# Patient Record
Sex: FEMALE | Race: WHITE | NOT HISPANIC OR LATINO | Employment: FULL TIME | URBAN - METROPOLITAN AREA
[De-identification: names, ages, dates, MRNs, and addresses within clinical notes are randomized per-mention and may not be internally consistent; named-entity substitution may affect disease eponyms.]

---

## 2017-03-07 DIAGNOSIS — Z78.0 MENOPAUSE: ICD-10-CM

## 2017-03-07 RX ORDER — ESTRADIOL 2 MG/1
2 TABLET ORAL DAILY
Qty: 30 TABLET | Refills: 1 | Status: SHIPPED | OUTPATIENT
Start: 2017-03-07 | End: 2017-05-11

## 2017-03-07 NOTE — TELEPHONE ENCOUNTER
Last office visit 12/3/15  Future appointment currently not scheduled    Routing refill request to provider for review/approval because:  Janette given x1 and patient did not follow up, please advise    Bushra Lance   [facility]  RN

## 2017-04-10 ENCOUNTER — TELEPHONE (OUTPATIENT)
Dept: OBGYN | Facility: CLINIC | Age: 52
End: 2017-04-10

## 2017-04-10 NOTE — TELEPHONE ENCOUNTER
"Date of last gyn office visit 12-03-15.  Date of last refill 03-07-17.  Was a \"thuy\" refill with note indicating that pt needs to schedule a future gyn exam.  No future appt has been scheduled to date.  [facility] RN    Routing refill request to provider for review/approval because:  Thuy given x1 and patient did not follow up, please advise    [facility] RN  "

## 2017-04-14 NOTE — TELEPHONE ENCOUNTER
I spoke to Shae informing her that her refill was authorized for a 30 day supply and that she is due for her annual visit.  She is a patient of Dr. Judd and wants to wait for her to return from [facility]  to schedule this visit.  She is not open to seeing a different provider at this time.  Roselia Krishnamurthy  [facility] RN

## 2017-05-11 DIAGNOSIS — Z78.0 MENOPAUSE: ICD-10-CM

## 2017-05-11 NOTE — TELEPHONE ENCOUNTER
Last office visit 12/2015  Routing refill request to provider for review/approval because:  Janette given x2 and patient did not follow up, please advise    Patient called to notify of need for annual visit.  Unable to reach patient.  Left message for patient to return call to clinic.    Will need provider approval for refill.    Bushra Lance   [facility]  RN

## 2017-05-12 RX ORDER — ESTRADIOL 2 MG/1
2 TABLET ORAL DAILY
Qty: 30 TABLET | Refills: 1 | Status: SHIPPED | OUTPATIENT
Start: 2017-05-12 | End: 2017-06-13

## 2017-05-12 NOTE — TELEPHONE ENCOUNTER
Pt tried to schedule an office with Dr. Narvaez or Marty and no available appts rest of May or June and the July schedule is not open at this time and they will call the pt once schedule is open.  Will need an additional refill to carry over to July.    Roselia Krishnamurthy  [facility] RN

## 2017-05-12 NOTE — TELEPHONE ENCOUNTER
Pt calling and requesting another refill on Estradiol.  She will schedule future visit hopefully in June once Dr. Judd is back.  Recommended that she schedule her gyn exam with another provider in Dr. Judd's absence.  Pt agrees with this plan.    Roselia Krishnamurthy  [facility] RN

## 2017-05-23 ENCOUNTER — OFFICE VISIT (OUTPATIENT)
Dept: FAMILY MEDICINE | Facility: CLINIC | Age: 52
End: 2017-05-23
Payer: COMMERCIAL

## 2017-05-23 VITALS
SYSTOLIC BLOOD PRESSURE: 135 MMHG | BODY MASS INDEX: 35.33 KG/M2 | TEMPERATURE: 97.3 F | OXYGEN SATURATION: 94 % | DIASTOLIC BLOOD PRESSURE: 79 MMHG | HEART RATE: 73 BPM | WEIGHT: 192 LBS | HEIGHT: 62 IN

## 2017-05-23 DIAGNOSIS — Z12.31 VISIT FOR SCREENING MAMMOGRAM: ICD-10-CM

## 2017-05-23 DIAGNOSIS — Z12.11 SPECIAL SCREENING FOR MALIGNANT NEOPLASMS, COLON: ICD-10-CM

## 2017-05-23 DIAGNOSIS — G43.109 MIGRAINE WITH AURA AND WITHOUT STATUS MIGRAINOSUS, NOT INTRACTABLE: ICD-10-CM

## 2017-05-23 DIAGNOSIS — R41.3 MEMORY DEFICIT: Primary | ICD-10-CM

## 2017-05-23 LAB
TSH SERPL DL<=0.005 MIU/L-ACNC: 2.75 MU/L (ref 0.4–4)
VIT B12 SERPL-MCNC: 513 PG/ML (ref 193–986)

## 2017-05-23 PROCEDURE — 36415 COLL VENOUS BLD VENIPUNCTURE: CPT | Performed by: INTERNAL MEDICINE

## 2017-05-23 PROCEDURE — 82607 VITAMIN B-12: CPT | Performed by: INTERNAL MEDICINE

## 2017-05-23 PROCEDURE — 99214 OFFICE O/P EST MOD 30 MIN: CPT | Performed by: INTERNAL MEDICINE

## 2017-05-23 PROCEDURE — 84443 ASSAY THYROID STIM HORMONE: CPT | Performed by: INTERNAL MEDICINE

## 2017-05-23 RX ORDER — SUMATRIPTAN 100 MG/1
100 TABLET, FILM COATED ORAL
Qty: 12 TABLET | Refills: 4 | Status: SHIPPED | OUTPATIENT
Start: 2017-05-23 | End: 2018-11-05

## 2017-05-23 NOTE — PROGRESS NOTES
SUBJECTIVE:                                                    Shae Pierre is a 52 year old female who presents to clinic today for the following health issues:  Chief Complaint   Patient presents with     Establish Care     Migraine Mgmt     change in headaches over past year, feels more pressure in head,      Referral     mammogram     Headaches      Duration: change in migraines, getting headaches once per week last anywhere from an hour to 2 days.  Imitrex working very well.  The headaches are different than before, more pressure in the head.  Change has occurred over the past year, but increasingly worse over the past 3 - 4 months.      Description  Location: bilateral in the frontal area, bilateral in the occipital area   Character: starts w/ flashing lights, then goes into dull ache and then throbbing to sharp pain  Frequency:  Once per week  Duration:  Lasting from 1 hour to 2 days.     Intensity:  Severe when happening     Accompanying signs and symptoms:    Precipitating or Alleviating factors:  Nausea/vomiting: usually  Dizziness: usually  Weakness or numbness: no just does not feel well and very fatigued.    Visual changes: flashing lights and gets a silver line and vision is split   Fever: no   Sinus or URI symptoms no     History  Head trauma: no   Family history of migraines: YES- patient has had migraines entire life        Precipitating or Alleviating factors (light/sound/sleep/caffeine): sounds and light make symptoms worse.     dark rooms, ice packs, quiet - all help    Therapies tried and outcome: Imitrex    Outcome - usually effective  Frequent/daily pain medication use: no, Advil on occasion.       Shae reported her main concern for this visit as migraines, but she actually feels this is fairly well controlled on the Imitrex and wishes to continue that.  She has been on migraine prophylaxis in the past and does not wish to restart that at this time.  Her main concern is memory loss.  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  She has an extensive family history of Alzheimer's in her maternal grandmother, maternal aunt, father, and 3 paternal uncles.  She has noticed some trouble for about two years but this is worsening.  She is not recognizing people that she should know.  She has been forgetting words occasionally and has trouble spelling, which she has historically be very good at.     She's had some fatigue, chronic constipation, hot flashes, and hair loss.  Has not had any weight change, skin changes, numbness, tingling, weakness.        Problem list and histories reviewed & adjusted, as indicated.  Additional history: as documented    Current Outpatient Prescriptions   Medication Sig Dispense Refill     SUMAtriptan (IMITREX) 100 MG tablet Take 1 tablet (100 mg) by mouth at onset of headache for migraine May repeat in 2 hours prn: max 2/day 12 tablet 4     estradiol (ESTRACE) 2 MG tablet Take 1 tablet (2 mg) by mouth daily DUE for annual appointment, please call to schedule for further refills. 30 tablet 1     MULTI-VITAMIN OR TABS 1 TABLET DAILY 30 0     doxycycline Monohydrate 50 MG CAPS  20 capsule      triamcinolone (KENALOG) 0.1 % cream Apply sparingly to affected area two times daily as needed. 60 g 1     ADVIL 200 MG OR TABS TAKE 1 TO 2 TABLETS EVERY 4 TO 6 HOURS AS NEEDED WITH FOOD 120 0     Allergies   Allergen Reactions     Codeine Other (See Comments) and Difficulty breathing     Throat closes     Morphine Other (See Comments) and Difficulty breathing     Throat closes     Sulfa Drugs Hives     Staples Other (See Comments) and Rash     Rash and infection after knee surgery       Reviewed and updated as needed this visit by clinical staff  Tobacco  Allergies  Med Hx  Surg Hx  Fam Hx  Soc Hx      Reviewed and updated as needed this visit by Provider      "    ROS:  Constitutional, endo, neuro systems are negative, except as otherwise noted.    OBJECTIVE:                                                    /79 (BP Location: Left leg, Patient Position: Chair, Cuff Size: Adult Regular)  Pulse 73  Temp 97.3  F (36.3  C) (Tympanic)  Ht 5' 1.75\" (1.568 m)  Wt 192 lb (87.1 kg)  LMP 04/05/2012  SpO2 94%  BMI 35.4 kg/m2  Body mass index is 35.4 kg/(m^2).    GENERAL: healthy, alert and no distress  NEURO: Cranial nerves intact, normal strength and tone, sensory exam grossly normal, mentation intact, DTR's normal and symmetric at patellas, gait normal including heel/toe/tandem walking and Romberg normal, normal finger to nose and heel to shin tets    SLUMS score is 24 with deficits in math problem, object recall, clock drawing, and story recall.      Diagnostic Test Results:  pending     ASSESSMENT/PLAN:                                                        1. Memory deficit    Will check labs as below- she does have a few hypothyroid symptoms.  Recommended further memory testing with neuropsych eval since SLUMs score was borderline.  She is going to check on insurance for coverage.  If that is abnormal, recommended proceeding with MRI.      - TSH with free T4 reflex  - Vitamin B12  - NEUROPSYCHOLOGY REFERRAL  - MR Brain w/o & w Contrast; Future    2. Migraine with aura and without status migrainosus, not intractable    She feels these are well controlled with Imitrex, does not wish to retry prophylactic med at this time.     - SUMAtriptan (IMITREX) 100 MG tablet; Take 1 tablet (100 mg) by mouth at onset of headache for migraine May repeat in 2 hours prn: max 2/day  Dispense: 12 tablet; Refill: 4    3. Visit for screening mammogram    - *MA Screening Digital Bilateral; Future    4. Special screening for malignant neoplasms, colon    - GASTROENTEROLOGY ADULT REF PROCEDURE ONLY    Follow-up pending results.      Miguel Ramirez MD  [facility]  "

## 2017-05-23 NOTE — NURSING NOTE
"Chief Complaint   Patient presents with     Establish Care     Migraine Mgmt     change in headaches over past year, feels more pressure in head,      Referral     mammogram       Initial /79 (BP Location: Left leg, Patient Position: Chair, Cuff Size: Adult Regular)  Pulse 73  Temp 97.3  F (36.3  C) (Tympanic)  Ht 5' 1.75\" (1.568 m)  Wt 192 lb (87.1 kg)  LMP 04/05/2012  SpO2 94%  BMI 35.4 kg/m2 Estimated body mass index is 35.4 kg/(m^2) as calculated from the following:    Height as of this encounter: 5' 1.75\" (1.568 m).    Weight as of this encounter: 192 lb (87.1 kg).  Medication Reconciliation: complete   Katie DVAIS CMA (AAMA)    "

## 2017-05-23 NOTE — MR AVS SNAPSHOT
After Visit Summary   5/23/2017    Shae Pierre    MRN: [id number]           Patient Information     Date Of Birth          1965        Visit Information        Provider Department      5/23/2017 7:20 AM Miguel Ramirez MD [facility]        Today's Diagnoses     Memory deficit    -  1    Visit for screening mammogram        Special screening for malignant neoplasms, colon        Migraine with aura and without status migrainosus, not intractable           Follow-ups after your visit        Additional Services     GASTROENTEROLOGY ADULT REF PROCEDURE ONLY       Last Lab Result: Creatinine (mg/dL)       Date                     Value                 04/22/2011               0.54             ----------  Body mass index is 35.4 kg/(m^2).     Needed:  No  Language:  English    Patient will be contacted to schedule procedure.     Please be aware that coverage of these services is subject to the terms and limitations of your health insurance plan.  Call member services at your health plan with any benefit or coverage questions.  Any procedures must be performed at a [place] facility OR coordinated by your clinic's referral office.    Please bring the following with you to your appointment:    (1) Any X-Rays, CTs or MRIs which have been performed.  Contact the facility where they were done to arrange for  prior to your scheduled appointment.    (2) List of current medications   (3) This referral request   (4) Any documents/labs given to you for this referral            NEUROPSYCHOLOGY REFERRAL       Your provider has referred you to:    [facility]: [facility] [phone]   [email or web address]     All scheduling is subject to the client's specific insurance plan & benefits, provider/location availability, and provider clinical specialities.  Please arrive 15 minutes early for your first appointment and bring your completed  paperwork.    Please be aware that coverage of these services is subject to the terms and limitations of your health insurance plan.  Call member services at your health plan with any benefit or coverage questions.    Please bring the following to your appointment:  >>   Any x-rays, CTs or MRIs which have been performed.  Contact the facility where they were done to arrange for  prior to your scheduled appointment.  Any new CT, MRI or other procedures ordered by your specialist must be performed at a [place] facility or coordinated by your clinic's referral office.    >>   List of current medications   >>   This referral request   >>   Any documents/labs given to you for this referral                  Your next 10 appointments already scheduled     Jul 21, 2017  9:00 AM CDT   PHYSICAL with Rosaura Way MD   [facility]    [address] [address]   [phone]              Future tests that were ordered for you today     Open Future Orders        Priority Expected Expires Ordered    *MA Screening Digital Bilateral Routine  5/23/2018 5/23/2017    MR Brain w/o & w Contrast Routine  5/23/2018 5/23/2017            Who to contact     If you have questions or need follow up information about today's clinic visit or your schedule please contact [facility] directly at [phone].  Normal or non-critical lab and imaging results will be communicated to you by MyChart, letter or phone within 4 business days after the clinic has received the results. If you do not hear from us within 7 days, please contact the clinic through MyChart or phone. If you have a critical or abnormal lab result, we will notify you by phone as soon as possible.  Submit refill requests through [employer] or call your pharmacy and they will forward the refill request to us. Please allow 3 business days for your refill to be completed.           "Additional Information About Your Visit        MyChart Information     [employer] lets you send messages to your doctor, view your test results, renew your prescriptions, schedule appointments and more. To sign up, go to [email or web address] . Click on \"Log in\" on the left side of the screen, which will take you to the Welcome page. Then click on \"Sign up Now\" on the right side of the page.     You will be asked to enter the access code listed below, as well as some personal information. Please follow the directions to create your username and password.     Your access code is: [email or web address]  Expires: 2017  8:13 AM     Your access code will  in 90 days. If you need help or a new code, please call your [place] clinic or [phone].        Care EveryWhere ID     This is your Care EveryWhere ID. This could be used by other organizations to access your [place] medical records  [id number]        Your Vitals Were     Pulse Temperature Height Last Period Pulse Oximetry BMI (Body Mass Index)    73 97.3  F (36.3  C) (Tympanic) 5' 1.75\" (1.568 m) 2012 94% 35.4 kg/m2       Blood Pressure from Last 3 Encounters:   17 135/79   08/10/16 154/90   12/03/15 132/85    Weight from Last 3 Encounters:   17 192 lb (87.1 kg)   12/03/15 194 lb (88 kg)   14 199 lb (90.3 kg)              We Performed the Following     GASTROENTEROLOGY ADULT REF PROCEDURE ONLY     NEUROPSYCHOLOGY REFERRAL     TSH with free T4 reflex     Vitamin B12        Primary Care Provider Office Phone # Fax #    Miguel Ramirez [phone]       [facility] [address] [address]        Thank you!     Thank you for choosing [facility]  for your care. Our goal is always to provide you with excellent care. Hearing back from our patients is one way we can continue to improve our services. Please take a few minutes to complete the written survey that you may receive in the mail " after your visit with us. Thank you!             Your Updated Medication List - Protect others around you: Learn how to safely use, store and throw away your medicines at www.disposemymeds.org.          This list is accurate as of: 5/23/17  8:13 AM.  Always use your most recent med list.                   Brand Name Dispense Instructions for use    ADVIL 200 MG tablet   Generic drug:  ibuprofen     120    TAKE 1 TO 2 TABLETS EVERY 4 TO 6 HOURS AS NEEDED WITH FOOD       doxycycline Monohydrate 50 MG Caps capsule     20 capsule        estradiol 2 MG tablet    ESTRACE    30 tablet    Take 1 tablet (2 mg) by mouth daily DUE for annual appointment, please call to schedule for further refills.       Multi-vitamin Tabs tablet   Generic drug:  multivitamin, therapeutic with minerals     30    1 TABLET DAILY       SUMAtriptan 100 MG tablet    IMITREX    6 tablet    Take 1 tablet by mouth at onset of headache for migraine. May repeat in 2 hours prn: max 2/day       triamcinolone 0.1 % cream    KENALOG    60 g    Apply sparingly to affected area two times daily as needed.

## 2017-05-30 ENCOUNTER — HOSPITAL ENCOUNTER (OUTPATIENT)
Dept: MRI IMAGING | Facility: CLINIC | Age: 52
Discharge: HOME | End: 2017-05-30
Attending: INTERNAL MEDICINE | Admitting: INTERNAL MEDICINE
Payer: COMMERCIAL

## 2017-05-30 DIAGNOSIS — G43.109 MIGRAINE WITH AURA AND WITHOUT STATUS MIGRAINOSUS, NOT INTRACTABLE: ICD-10-CM

## 2017-05-30 PROCEDURE — 70553 MRI BRAIN STEM W/O & W/DYE: CPT

## 2017-05-30 PROCEDURE — A9585 GADOBUTROL INJECTION: HCPCS | Performed by: INTERNAL MEDICINE

## 2017-05-30 PROCEDURE — 25000128 H RX IP 250 OP 636: Performed by: INTERNAL MEDICINE

## 2017-05-30 RX ORDER — GADOBUTROL 604.72 MG/ML
8 INJECTION INTRAVENOUS ONCE
Status: COMPLETED | OUTPATIENT
Start: 2017-05-30 | End: 2017-05-30

## 2017-05-30 RX ADMIN — GADOBUTROL 8 ML: 604.72 INJECTION INTRAVENOUS at 07:02

## 2017-06-13 DIAGNOSIS — Z78.0 MENOPAUSE: ICD-10-CM

## 2017-06-13 RX ORDER — ESTRADIOL 2 MG/1
2 TABLET ORAL DAILY
Qty: 30 TABLET | Refills: 0 | Status: SHIPPED | OUTPATIENT
Start: 2017-06-13 | End: 2017-06-26

## 2017-06-13 NOTE — TELEPHONE ENCOUNTER
Estradiol      Last Written Prescription Date: 05/12/2017  Last Fill Quantity: 30, # refills: 1  Last Office Visit with [facility], [facility] or [facility] prescribing provider: 05/23/2017  Next 5 appointments (look out 90 days)     Jul 21, 2017  9:00 AM CDT   PHYSICAL with Rosaura Way MD   [facility]    [address] [address]   [phone]                   BP Readings from Last 3 Encounters:   05/23/17 135/79   08/10/16 154/90   12/03/15 132/85     Date of last Breast Exam: \

## 2017-06-13 NOTE — TELEPHONE ENCOUNTER
Has physical scheduled for 7/21/17    Prescription approved per [facility] Refill Protocol.    Aparna MEDRANO RN

## 2017-06-16 ENCOUNTER — HOSPITAL ENCOUNTER (OUTPATIENT)
Dept: MAMMOGRAPHY | Facility: CLINIC | Age: 52
Discharge: HOME | End: 2017-06-16
Attending: INTERNAL MEDICINE | Admitting: INTERNAL MEDICINE
Payer: COMMERCIAL

## 2017-06-16 DIAGNOSIS — Z12.31 VISIT FOR SCREENING MAMMOGRAM: ICD-10-CM

## 2017-06-16 PROCEDURE — 77063 BREAST TOMOSYNTHESIS BI: CPT

## 2017-06-26 ENCOUNTER — OFFICE VISIT (OUTPATIENT)
Dept: OBGYN | Facility: CLINIC | Age: 52
End: 2017-06-26
Payer: COMMERCIAL

## 2017-06-26 VITALS
DIASTOLIC BLOOD PRESSURE: 74 MMHG | HEART RATE: 77 BPM | WEIGHT: 194 LBS | BODY MASS INDEX: 35.7 KG/M2 | SYSTOLIC BLOOD PRESSURE: 112 MMHG | HEIGHT: 62 IN

## 2017-06-26 DIAGNOSIS — Z00.00 ROUTINE GENERAL MEDICAL EXAMINATION AT A HEALTH CARE FACILITY: Primary | ICD-10-CM

## 2017-06-26 DIAGNOSIS — N95.1 SYMPTOMATIC MENOPAUSAL OR FEMALE CLIMACTERIC STATES: ICD-10-CM

## 2017-06-26 DIAGNOSIS — Z13.6 CARDIOVASCULAR SCREENING; LDL GOAL LESS THAN 160: ICD-10-CM

## 2017-06-26 PROCEDURE — 99396 PREV VISIT EST AGE 40-64: CPT | Performed by: OBSTETRICS & GYNECOLOGY

## 2017-06-26 RX ORDER — ESTRADIOL 1 MG/1
1 TABLET ORAL DAILY
Qty: 90 TABLET | Refills: 3 | Status: SHIPPED | OUTPATIENT
Start: 2017-06-26 | End: 2018-07-16

## 2017-06-26 NOTE — PROGRESS NOTES
SUBJECTIVE:     CC: Shae Pierre is an 52 year old woman who presents for preventive health visit.     Healthy Habits:    Do you get at least three servings of calcium containing foods daily (dairy, green leafy vegetables, etc.)? yes    Amount of exercise or daily activities, outside of work: 5 day(s) per week    Problems taking medications regularly No    Medication side effects: No    Have you had an eye exam in the past two years? yes    Do you see a dentist twice per year? yes    Do you have sleep apnea, excessive snoring or daytime drowsiness?no            Today's PHQ-2 Score:   PHQ-2 ( 1999 [employer]) 6/26/2017 12/3/2015   Q1: Little interest or pleasure in doing things 0 0   Q2: Feeling down, depressed or hopeless 0 0   PHQ-2 Score 0 0       Abuse: Current or Past(Physical, Sexual or Emotional)- No  Do you feel safe in your environment - Yes    Social History   Substance Use Topics     Smoking status: Former Smoker     Types: Cigarettes     Quit date: 4/1/2010     Smokeless tobacco: Never Used     Alcohol use Yes      Comment: occ     The patient does not drink >3 drinks per day nor >7 drinks per week.    Recent Labs   Lab Test  01/23/12   1113   CHOL  211*   HDL  76   LDL  112   TRIG  114   CHOLHDLRATIO  3.0       Reviewed orders with patient.  Reviewed health maintenance and updated orders accordingly - Yes    Mammo Decision Support:  Patient over age 50, mutual decision to screen reflected in health maintenance.    Pertinent mammograms are reviewed under the imaging tab.  History of abnormal Pap smear: Status post benign hysterectomy. Health Maintenance and Surgical History updated.    Reviewed and updated as needed this visit by clinical staff  Tobacco  Allergies  Meds         Reviewed and updated as needed this visit by Provider            ROS:  C: NEGATIVE for fever, chills, change in weight  I: NEGATIVE for worrisome rashes, moles or lesions  E: NEGATIVE for vision changes or irritation  ENT:  "NEGATIVE for ear, mouth and throat problems  R: NEGATIVE for significant cough or SOB  B: NEGATIVE for masses, tenderness or discharge  CV: NEGATIVE for chest pain, palpitations or peripheral edema  GI: NEGATIVE for nausea, abdominal pain, heartburn, or change in bowel habits  : NEGATIVE for unusual urinary or vaginal symptoms. No vaginal bleeding.  M: NEGATIVE for significant arthralgias or myalgia  N: NEGATIVE for weakness, dizziness or paresthesias  P: NEGATIVE for changes in mood or affect       OBJECTIVE:     /74 (BP Location: Right arm, Patient Position: Chair, Cuff Size: Adult Large)  Pulse 77  Ht 5' 1.75\" (1.568 m)  Wt 194 lb (88 kg)  LMP 04/05/2012  BMI 35.77 kg/m2  EXAM:  GENERAL APPEARANCE: healthy, alert and no distress  GENERAL APPEARANCE: obese  EYES: Eyes grossly normal to inspection, PERRL and conjunctivae and sclerae normal  HENT: ear canals and TM's normal, nose and mouth without ulcers or lesions, oropharynx clear and oral mucous membranes moist  NECK: no adenopathy, no asymmetry, masses, or scars and thyroid normal to palpation  RESP: lungs clear to auscultation - no rales, rhonchi or wheezes  BREAST: normal without masses, tenderness or nipple discharge and no palpable axillary masses or adenopathy  CV: regular rate and rhythm, normal S1 S2, no S3 or S4, no murmur, click or rub, no peripheral edema and peripheral pulses strong  ABDOMEN: soft, nontender, no hepatosplenomegaly, no masses and bowel sounds normal   (female): normal female external genitalia, normal urethral meatus, vaginal mucosal atrophy noted and normal post-hysterectomy exam without masses.   Rectal; small anterior hemorroidal tags  MS: no musculoskeletal defects are noted and gait is age appropriate without ataxia  SKIN: no suspicious lesions or rashes  NEURO: Normal strength and tone, sensory exam grossly normal, mentation intact and speech normal  PSYCH: mentation appears normal and affect " "normal/bright    ASSESSMENT/PLAN:         ICD-10-CM    1. Routine general medical examination at a health care facility Z00.00    2. Symptomatic menopausal or female climacteric states N95.1 estradiol (ESTRACE) 1 MG tablet     GASTROENTEROLOGY ADULT REF PROCEDURE ONLY   3. CARDIOVASCULAR SCREENING; LDL GOAL LESS THAN 160 Z13.6 GLUCOSE     Lipid Profile with reflex to direct LDL     CANCELED: Glucose       COUNSELING:   Reviewed preventive health counseling, as reflected in patient instructions  Special attention given to:        cardiovascular screening, weight loss        Regular exercise       Healthy diet/nutrition       Colon cancer screening         reports that she quit smoking about 7 years ago. Her smoking use included Cigarettes. She has never used smokeless tobacco.    Estimated body mass index is 35.77 kg/(m^2) as calculated from the following:    Height as of this encounter: 5' 1.75\" (1.568 m).    Weight as of this encounter: 194 lb (88 kg).   Weight management plan: Discussed healthy diet and exercise guidelines and patient will follow up in 12 months in clinic to re-evaluate.    Counseling Resources:  ATP IV Guidelines  Pooled Cohorts Equation Calculator  Breast Cancer Risk Calculator  FRAX Risk Assessment  ICSI Preventive Guidelines  Dietary Guidelines for Americans, 2010  USDA's MyPlate  ASA Prophylaxis  Lung CA Screening    Amy Judd MD  [facility]  "

## 2017-06-26 NOTE — MR AVS SNAPSHOT
After Visit Summary   6/26/2017    Shae Pierre    MRN: [id number]           Patient Information     Date Of Birth          1965        Visit Information        Provider Department      6/26/2017 2:30 PM Amy Judd MD [facility]        Today's Diagnoses     Routine general medical examination at a health care facility    -  1    Symptomatic menopausal or female climacteric states        CARDIOVASCULAR SCREENING; LDL GOAL LESS THAN 160          Care Instructions      Preventive Health Recommendations  Female Ages 50 - 64    Yearly exam: See your health care provider every year in order to  o Review health changes.   o Discuss preventive care.    o Review your medicines if your doctor has prescribed any.      Get a Pap test every three years (unless you have an abnormal result and your provider advises testing more often).    If you get Pap tests with HPV test, you only need to test every 5 years, unless you have an abnormal result.     You do not need a Pap test if your uterus was removed (hysterectomy) and you have not had cancer.    You should be tested each year for STDs (sexually transmitted diseases) if you're at risk.     Have a mammogram every 1 to 2 years.    Have a colonoscopy at age 50, or have a yearly FIT test (stool test). These exams screen for colon cancer.      Have a cholesterol test every 5 years, or more often if advised.    Have a diabetes test (fasting glucose) every three years. If you are at risk for diabetes, you should have this test more often.     If you are at risk for osteoporosis (brittle bone disease), think about having a bone density scan (DEXA).    Shots: Get a flu shot each year. Get a tetanus shot every 10 years.    Nutrition:     Eat at least 5 servings of fruits and vegetables each day.    Eat whole-grain bread, whole-wheat pasta and brown rice instead of white grains and rice.    Talk to your provider about Calcium and Vitamin D.      Lifestyle    Exercise at least 150 minutes a week (30 minutes a day, 5 days a week). This will help you control your weight and prevent disease.    Limit alcohol to one drink per day.    No smoking.     Wear sunscreen to prevent skin cancer.     See your dentist every six months for an exam and cleaning.    See your eye doctor every 1 to 2 years.            Follow-ups after your visit        Additional Services     GASTROENTEROLOGY ADULT REF PROCEDURE ONLY       Last Lab Result: Creatinine (mg/dL)       Date                     Value                 04/22/2011               0.54             ----------  Body mass index is 35.77 kg/(m^2).     Needed:  No  Language:  English    Patient will be contacted to schedule procedure.     Please be aware that coverage of these services is subject to the terms and limitations of your health insurance plan.  Call member services at your health plan with any benefit or coverage questions.  Any procedures must be performed at a [place] facility OR coordinated by your clinic's referral office.    Please bring the following with you to your appointment:    (1) Any X-Rays, CTs or MRIs which have been performed.  Contact the facility where they were done to arrange for  prior to your scheduled appointment.    (2) List of current medications   (3) This referral request   (4) Any documents/labs given to you for this referral                  Future tests that were ordered for you today     Open Future Orders        Priority Expected Expires Ordered    GLUCOSE Routine  6/26/2018 6/26/2017    Lipid Profile with reflex to direct LDL Routine  6/26/2018 6/26/2017            Who to contact     If you have questions or need follow up information about today's clinic visit or your schedule please contact [facility] directly at [phone].  Normal or non-critical lab and imaging results will be communicated to you by MyChart, letter or phone within 4  "business days after the clinic has received the results. If you do not hear from us within 7 days, please contact the clinic through [employer] or phone. If you have a critical or abnormal lab result, we will notify you by phone as soon as possible.  Submit refill requests through [employer] or call your pharmacy and they will forward the refill request to us. Please allow 3 business days for your refill to be completed.          Additional Information About Your Visit        [employer] Information     [employer] lets you send messages to your doctor, view your test results, renew your prescriptions, schedule appointments and more. To sign up, go to [email or web address] . Click on \"Log in\" on the left side of the screen, which will take you to the Welcome page. Then click on \"Sign up Now\" on the right side of the page.     You will be asked to enter the access code listed below, as well as some personal information. Please follow the directions to create your username and password.     Your access code is: [email or web address]  Expires: 2017  8:13 AM     Your access code will  in 90 days. If you need help or a new code, please call your [facility] clinic or [phone].        Care EveryWhere ID     This is your Care EveryWhere ID. This could be used by other organizations to access your [facility] medical records  [id number]        Your Vitals Were     Pulse Height Last Period BMI (Body Mass Index)          77 5' 1.75\" (1.568 m) 2012 35.77 kg/m2         Blood Pressure from Last 3 Encounters:   17 112/74   17 135/79   08/10/16 154/90    Weight from Last 3 Encounters:   17 194 lb (88 kg)   17 192 lb (87.1 kg)   12/03/15 194 lb (88 kg)              We Performed the Following     GASTROENTEROLOGY ADULT REF PROCEDURE ONLY          Today's Medication Changes          These changes are accurate as of: 17  3:01 PM.  If you have any questions, ask your nurse or doctor.               These " medicines have changed or have updated prescriptions.        Dose/Directions    estradiol 1 MG tablet   Commonly known as:  ESTRACE   This may have changed:    - medication strength  - how much to take  - additional instructions   Used for:  Symptomatic menopausal or female climacteric states   Changed by:  Amy Judd MD        Dose:  1 mg   Take 1 tablet (1 mg) by mouth daily   Quantity:  90 tablet   Refills:  3            Where to get your medicines      These medications were sent to [place] [facility] - [address]  [address], [place] [address]    Hours:  T[place] [facility] Phone:  [phone]     estradiol 1 MG tablet                Primary Care Provider Office Phone # Fax #    RuthAreli Ramirez [phone]       [facility] [address] [address]        Equal Access to Services     JAMAL DUARTE : Hadii eli rice hadasho Soomaali, waaxda luqadaha, qaybta kaalmada adeegyada, cayla bourne haymarsha costello . So [facility] [phone].    ATENCIÓN: Si habla español, tiene a metcalf disposición servicios gratuitos de asistencia lingüística. [facility] [phone].    We comply with applicable federal civil rights laws and [place] laws. We do not discriminate on the basis of race, color, national origin, age, disability sex, sexual orientation or gender identity.            Thank you!     Thank you for choosing [facility]  for your care. Our goal is always to provide you with excellent care. Hearing back from our patients is one way we can continue to improve our services. Please take a few minutes to complete the written survey that you may receive in the mail after your visit with us. Thank you!             Your Updated Medication List - Protect others around you: Learn how to safely use, store and throw away your medicines at www.disposemymeds.org.          This list is accurate as of: 6/26/17   3:01 PM.  Always use your most recent med list.                   Brand Name Dispense Instructions for use Diagnosis    ADVIL 200 MG tablet   Generic drug:  ibuprofen     120    TAKE 1 TO 2 TABLETS EVERY 4 TO 6 HOURS AS NEEDED WITH FOOD        doxycycline Monohydrate 50 MG Caps capsule     20 capsule         estradiol 1 MG tablet    ESTRACE    90 tablet    Take 1 tablet (1 mg) by mouth daily    Symptomatic menopausal or female climacteric states       Multi-vitamin Tabs tablet   Generic drug:  multivitamin, therapeutic with minerals     30    1 TABLET DAILY        SUMAtriptan 100 MG tablet    IMITREX    12 tablet    Take 1 tablet (100 mg) by mouth at onset of headache for migraine May repeat in 2 hours prn: max 2/day    Migraine with aura and without status migrainosus, not intractable       triamcinolone 0.1 % cream    KENALOG    60 g    Apply sparingly to affected area two times daily as needed.    Dermatitis

## 2017-06-26 NOTE — NURSING NOTE
"Initial /74 (BP Location: Right arm, Patient Position: Chair, Cuff Size: Adult Large)  Pulse 77  Ht 5' 1.75\" (1.568 m)  Wt 194 lb (88 kg)  LMP 04/05/2012  BMI 35.77 kg/m2 Estimated body mass index is 35.77 kg/(m^2) as calculated from the following:    Height as of this encounter: 5' 1.75\" (1.568 m).    Weight as of this encounter: 194 lb (88 kg). .      "

## 2018-04-03 ENCOUNTER — ANESTHESIA EVENT (OUTPATIENT)
Dept: GASTROENTEROLOGY | Facility: CLINIC | Age: 53
End: 2018-04-03
Payer: COMMERCIAL

## 2018-04-03 ASSESSMENT — LIFESTYLE VARIABLES: TOBACCO_USE: 1

## 2018-04-03 NOTE — ANESTHESIA PREPROCEDURE EVALUATION
Anesthesia Evaluation     . Pt has had prior anesthetic. Type: MAC and General    History of anesthetic complications   - PONV        ROS/MED HX    ENT/Pulmonary:     (+)tobacco use, Past use , . .    Neurologic:     (+)migraines,     Cardiovascular:  - neg cardiovascular ROS       METS/Exercise Tolerance:  >4 METS   Hematologic:  - neg hematologic  ROS       Musculoskeletal:  - neg musculoskeletal ROS       GI/Hepatic:  - neg GI/hepatic ROS       Renal/Genitourinary:  - ROS Renal section negative       Endo:     (+) Obesity, .      Psychiatric:  - neg psychiatric ROS       Infectious Disease:  - neg infectious disease ROS       Malignancy:      - no malignancy   Other:    - neg other ROS                 Physical Exam  Normal systems: cardiovascular, pulmonary and dental    Airway   Mallampati: II  TM distance: >3 FB  Neck ROM: full    Dental     Cardiovascular       Pulmonary                     Anesthesia Plan      History & Physical Review  History and physical reviewed and following examination; no interval change.    ASA Status:  2 .    NPO Status:  > 4 hours    Plan for MAC Reason for MAC:  Deep or markedly invasive procedure (G8)         Postoperative Care      Consents  Anesthetic plan, risks, benefits and alternatives discussed with:  Patient..                          .

## 2018-04-16 ENCOUNTER — APPOINTMENT (OUTPATIENT)
Dept: GENERAL RADIOLOGY | Facility: CLINIC | Age: 53
End: 2018-04-16
Attending: SURGERY
Payer: COMMERCIAL

## 2018-04-16 ENCOUNTER — SURGERY (OUTPATIENT)
Age: 53
End: 2018-04-16

## 2018-04-16 ENCOUNTER — ANESTHESIA (OUTPATIENT)
Dept: GASTROENTEROLOGY | Facility: CLINIC | Age: 53
End: 2018-04-16
Payer: COMMERCIAL

## 2018-04-16 ENCOUNTER — HOSPITAL ENCOUNTER (OUTPATIENT)
Facility: CLINIC | Age: 53
Discharge: HOME | End: 2018-04-16
Attending: SURGERY | Admitting: SURGERY
Payer: COMMERCIAL

## 2018-04-16 VITALS
WEIGHT: 196 LBS | HEIGHT: 60 IN | OXYGEN SATURATION: 100 % | BODY MASS INDEX: 38.48 KG/M2 | SYSTOLIC BLOOD PRESSURE: 163 MMHG | DIASTOLIC BLOOD PRESSURE: 97 MMHG | TEMPERATURE: 98.7 F | RESPIRATION RATE: 16 BRPM

## 2018-04-16 LAB — COLONOSCOPY: NORMAL

## 2018-04-16 PROCEDURE — 25000125 ZZHC RX 250: Performed by: SURGERY

## 2018-04-16 PROCEDURE — 25000128 H RX IP 250 OP 636: Performed by: SURGERY

## 2018-04-16 PROCEDURE — 25000128 H RX IP 250 OP 636: Performed by: NURSE ANESTHETIST, CERTIFIED REGISTERED

## 2018-04-16 PROCEDURE — 37000008 ZZH ANESTHESIA TECHNICAL FEE, 1ST 30 MIN: Performed by: SURGERY

## 2018-04-16 PROCEDURE — 25000125 ZZHC RX 250: Performed by: NURSE ANESTHETIST, CERTIFIED REGISTERED

## 2018-04-16 PROCEDURE — 71045 X-RAY EXAM CHEST 1 VIEW: CPT

## 2018-04-16 PROCEDURE — G0121 COLON CA SCRN NOT HI RSK IND: HCPCS | Performed by: SURGERY

## 2018-04-16 PROCEDURE — 93005 ELECTROCARDIOGRAM TRACING: CPT

## 2018-04-16 PROCEDURE — 45378 DIAGNOSTIC COLONOSCOPY: CPT | Performed by: SURGERY

## 2018-04-16 RX ORDER — LIDOCAINE HYDROCHLORIDE 10 MG/ML
INJECTION, SOLUTION INFILTRATION; PERINEURAL PRN
Status: DISCONTINUED | OUTPATIENT
Start: 2018-04-16 | End: 2018-04-16

## 2018-04-16 RX ORDER — GLYCOPYRROLATE 0.2 MG/ML
INJECTION, SOLUTION INTRAMUSCULAR; INTRAVENOUS PRN
Status: DISCONTINUED | OUTPATIENT
Start: 2018-04-16 | End: 2018-04-16

## 2018-04-16 RX ORDER — LIDOCAINE 40 MG/G
CREAM TOPICAL
Status: DISCONTINUED | OUTPATIENT
Start: 2018-04-16 | End: 2018-04-16 | Stop reason: HOSPADM

## 2018-04-16 RX ORDER — PROPOFOL 10 MG/ML
INJECTION, EMULSION INTRAVENOUS PRN
Status: DISCONTINUED | OUTPATIENT
Start: 2018-04-16 | End: 2018-04-16

## 2018-04-16 RX ORDER — PROPOFOL 10 MG/ML
INJECTION, EMULSION INTRAVENOUS CONTINUOUS PRN
Status: DISCONTINUED | OUTPATIENT
Start: 2018-04-16 | End: 2018-04-16

## 2018-04-16 RX ORDER — ONDANSETRON 2 MG/ML
4 INJECTION INTRAMUSCULAR; INTRAVENOUS
Status: DISCONTINUED | OUTPATIENT
Start: 2018-04-16 | End: 2018-04-16 | Stop reason: HOSPADM

## 2018-04-16 RX ORDER — SODIUM CHLORIDE, SODIUM LACTATE, POTASSIUM CHLORIDE, CALCIUM CHLORIDE 600; 310; 30; 20 MG/100ML; MG/100ML; MG/100ML; MG/100ML
INJECTION, SOLUTION INTRAVENOUS CONTINUOUS
Status: DISCONTINUED | OUTPATIENT
Start: 2018-04-16 | End: 2018-04-16 | Stop reason: HOSPADM

## 2018-04-16 RX ORDER — HYDROMORPHONE HYDROCHLORIDE 1 MG/ML
.5-1 INJECTION, SOLUTION INTRAMUSCULAR; INTRAVENOUS; SUBCUTANEOUS
Status: DISCONTINUED | OUTPATIENT
Start: 2018-04-16 | End: 2018-04-16 | Stop reason: HOSPADM

## 2018-04-16 RX ADMIN — GLYCOPYRROLATE 0.1 MG: 0.2 INJECTION, SOLUTION INTRAMUSCULAR; INTRAVENOUS at 10:47

## 2018-04-16 RX ADMIN — LIDOCAINE HYDROCHLORIDE 50 MG: 10 INJECTION, SOLUTION INFILTRATION; PERINEURAL at 10:46

## 2018-04-16 RX ADMIN — SODIUM CHLORIDE, POTASSIUM CHLORIDE, SODIUM LACTATE AND CALCIUM CHLORIDE: 600; 310; 30; 20 INJECTION, SOLUTION INTRAVENOUS at 10:39

## 2018-04-16 RX ADMIN — PROPOFOL 50 MG: 10 INJECTION, EMULSION INTRAVENOUS at 10:47

## 2018-04-16 RX ADMIN — LIDOCAINE HYDROCHLORIDE 1 ML: 10 INJECTION, SOLUTION EPIDURAL; INFILTRATION; INTRACAUDAL; PERINEURAL at 10:39

## 2018-04-16 RX ADMIN — PROPOFOL 200 MCG/KG/MIN: 10 INJECTION, EMULSION INTRAVENOUS at 10:47

## 2018-04-16 RX ADMIN — PROPOFOL 20 MG: 10 INJECTION, EMULSION INTRAVENOUS at 10:49

## 2018-04-16 RX ADMIN — HYDROMORPHONE HYDROCHLORIDE 0.5 MG: 1 INJECTION, SOLUTION INTRAMUSCULAR; INTRAVENOUS; SUBCUTANEOUS at 12:26

## 2018-04-16 RX ADMIN — PROPOFOL 30 MG: 10 INJECTION, EMULSION INTRAVENOUS at 10:53

## 2018-04-16 RX ADMIN — GLYCOPYRROLATE 0.1 MG: 0.2 INJECTION, SOLUTION INTRAMUSCULAR; INTRAVENOUS at 10:46

## 2018-04-16 NOTE — PROGRESS NOTES
Ekg and chest xray normal per Dr Donovan. Pressure in right anterior chest is almost gone. BP coming down. Dr Donovan says discharge to home. Pt will go to ED if chest pain. She will have her BP rechecked tonight and tomorrow.Report any elevation to her primary MD.

## 2018-04-16 NOTE — PROGRESS NOTES
Pt has been having chest tightness on right. Above breast. BP is high. She says she doesn't feel well. Dr Donovan was notified and see orders. O2 on at 3 L. nc

## 2018-04-16 NOTE — ANESTHESIA POSTPROCEDURE EVALUATION
Patient: Shae Pierre    Procedure(s):  Colonoscopy - Wound Class: II-Clean Contaminated    Diagnosis:screening  Diagnosis Additional Information: No value filed.    Anesthesia Type:  No value filed.    Note:  Anesthesia Post Evaluation    Patient location during evaluation: Phase 2  Patient participation: Able to fully participate in evaluation  Level of consciousness: awake  Pain management: adequate  Airway patency: patent  Cardiovascular status: acceptable and hemodynamically stable  Respiratory status: acceptable, room air and spontaneous ventilation  Hydration status: acceptable  PONV: none     Anesthetic complications: None          Last vitals:  Vitals:    04/16/18 1019   BP: (!) 131/116   Resp: 16   Temp: 36.9  C (98.5  F)   SpO2: 97%         Electronically Signed By: STEFANIE Santillan CRNA  April 16, 2018  11:01 AM

## 2018-04-16 NOTE — ANESTHESIA CARE TRANSFER NOTE
Patient: Shae Pierre    Procedure(s):  Colonoscopy - Wound Class: II-Clean Contaminated    Diagnosis: screening  Diagnosis Additional Information: No value filed.    Anesthesia Type:   No value filed.     Note:  Airway :Room Air  Patient transferred to:Phase II  Handoff Report: Identifed the Patient, Identified the Reponsible Provider, Reviewed the pertinent medical history, Discussed the surgical course, Reviewed Intra-OP anesthesia mangement and issues during anesthesia, Set expectations for post-procedure period and Allowed opportunity for questions and acknowledgement of understanding      Vitals: (Last set prior to Anesthesia Care Transfer)    CRNA VITALS  4/16/2018 1030 - 4/16/2018 1100      4/16/2018             Pulse: 84    SpO2: 98 %    EKG: NSR                Electronically Signed By: STEFANIE Santillan CRNA  April 16, 2018  11:00 AM

## 2018-04-16 NOTE — H&P
53 year old year old female here for colonoscopy for screening.    Patient Active Problem List   Diagnosis     Elevated blood pressure reading without diagnosis of hypertension     Dermatophytosis of nail     Obesity     CARDIOVASCULAR SCREENING; LDL GOAL LESS THAN 160     Migraine headache     Breast cyst     Urge incontinence of urine       Past Medical History:   Diagnosis Date     Dysplasia of cervix (uteri) 1998     Fibroids 2/22/2012     Other abnormal Pap smear and cervical HPV (human papapillomavirus) 9/29/2008    EM>40 and pos HPV unknown type rec rpt pap 6 mos/11/08  1/26/2009 F/u pap was normal  Will have next pap in 1 year.      Other acne      PONV (postoperative nausea and vomiting)        Past Surgical History:   Procedure Laterality Date     EXCISE MASS TRUNK  7/13/2012    Procedure: EXCISE MASS TRUNK;  Excisional Removal Chest Wall Cysts X2;  Surgeon: Dennys Henriquez MD;  Location: [facility] OR     HYSTERECTOMY, PAP NO LONGER INDICATED       LAPAROSCOPIC ASSISTED HYSTERECTOMY VAGINAL, BILATERAL SALPINGO-OOPHORECTOMY, COMBINED  4/5/2012    LAVH-BSO     SURGICAL HISTORY OF -       LEEP     SURGICAL HISTORY OF -       Cryotherapy      SURGICAL HISTORY OF -       Colpo     SURGICAL HISTORY OF -       left knee[facility]patella [facility]       [facility]    No current outpatient prescriptions on file.       Allergies   Allergen Reactions     Codeine Other (See Comments) and Difficulty breathing     Throat closes     Morphine Other (See Comments) and Difficulty breathing     Throat closes     Sulfa Drugs Hives     Staples Other (See Comments) and Rash     Rash and infection after knee surgery       Pt reports that she quit smoking about 8 years ago. Her smoking use included Cigarettes. She has never used smokeless tobacco. She reports that she drinks alcohol. She reports that she does not use illicit drugs.    Exam:  LMP 04/05/2012    Awake, Alert OX3  Lungs - CTA bilaterally  CV - RRR, no murmurs, distal pulses  intact  Abd - soft, non-distended, non-tender, +BS  Extr - No cyanosis or edema    A/P 53 year old year old female in need of colonoscopy for screening. Risks, benefits, alternatives, and complications were discussed including the possibility of perforation and the patient agreed to proceed    Chuck Donovan MD

## 2018-04-18 ENCOUNTER — OFFICE VISIT (OUTPATIENT)
Dept: FAMILY MEDICINE | Facility: CLINIC | Age: 53
End: 2018-04-18
Payer: COMMERCIAL

## 2018-04-18 VITALS
WEIGHT: 200 LBS | TEMPERATURE: 98.6 F | HEART RATE: 75 BPM | OXYGEN SATURATION: 96 % | SYSTOLIC BLOOD PRESSURE: 118 MMHG | DIASTOLIC BLOOD PRESSURE: 71 MMHG | BODY MASS INDEX: 39.06 KG/M2

## 2018-04-18 DIAGNOSIS — R10.13 ABDOMINAL PAIN, EPIGASTRIC: ICD-10-CM

## 2018-04-18 DIAGNOSIS — I10 BENIGN ESSENTIAL HYPERTENSION: Primary | ICD-10-CM

## 2018-04-18 DIAGNOSIS — Z13.6 CARDIOVASCULAR SCREENING; LDL GOAL LESS THAN 130: ICD-10-CM

## 2018-04-18 LAB
ALBUMIN SERPL-MCNC: 3.7 G/DL (ref 3.4–5)
ALP SERPL-CCNC: 96 U/L (ref 40–150)
ALT SERPL W P-5'-P-CCNC: 34 U/L (ref 0–50)
ANION GAP SERPL CALCULATED.3IONS-SCNC: 6 MMOL/L (ref 3–14)
AST SERPL W P-5'-P-CCNC: 26 U/L (ref 0–45)
BASOPHILS # BLD AUTO: 0.1 10E9/L (ref 0–0.2)
BASOPHILS NFR BLD AUTO: 0.6 %
BILIRUB SERPL-MCNC: 0.3 MG/DL (ref 0.2–1.3)
BUN SERPL-MCNC: 9 MG/DL (ref 7–30)
CALCIUM SERPL-MCNC: 8.4 MG/DL (ref 8.5–10.1)
CHLORIDE SERPL-SCNC: 109 MMOL/L (ref 94–109)
CHOLEST SERPL-MCNC: 216 MG/DL
CO2 SERPL-SCNC: 25 MMOL/L (ref 20–32)
CREAT SERPL-MCNC: 0.6 MG/DL (ref 0.52–1.04)
DIFFERENTIAL METHOD BLD: NORMAL
EOSINOPHIL # BLD AUTO: 0.4 10E9/L (ref 0–0.7)
EOSINOPHIL NFR BLD AUTO: 3.3 %
ERYTHROCYTE [DISTWIDTH] IN BLOOD BY AUTOMATED COUNT: 12.9 % (ref 10–15)
GFR SERPL CREATININE-BSD FRML MDRD: >90 ML/MIN/1.7M2
GLUCOSE SERPL-MCNC: 88 MG/DL (ref 70–99)
HCT VFR BLD AUTO: 43.2 % (ref 35–47)
HDLC SERPL-MCNC: 50 MG/DL
HGB BLD-MCNC: 14.2 G/DL (ref 11.7–15.7)
LDLC SERPL CALC-MCNC: 116 MG/DL
LIPASE SERPL-CCNC: 159 U/L (ref 73–393)
LYMPHOCYTES # BLD AUTO: 3 10E9/L (ref 0.8–5.3)
LYMPHOCYTES NFR BLD AUTO: 27 %
MCH RBC QN AUTO: 31.8 PG (ref 26.5–33)
MCHC RBC AUTO-ENTMCNC: 32.9 G/DL (ref 31.5–36.5)
MCV RBC AUTO: 97 FL (ref 78–100)
MONOCYTES # BLD AUTO: 0.9 10E9/L (ref 0–1.3)
MONOCYTES NFR BLD AUTO: 8.4 %
NEUTROPHILS # BLD AUTO: 6.6 10E9/L (ref 1.6–8.3)
NEUTROPHILS NFR BLD AUTO: 60.7 %
NONHDLC SERPL-MCNC: 166 MG/DL
PLATELET # BLD AUTO: 334 10E9/L (ref 150–450)
POTASSIUM SERPL-SCNC: 4.1 MMOL/L (ref 3.4–5.3)
PROT SERPL-MCNC: 7.1 G/DL (ref 6.8–8.8)
RBC # BLD AUTO: 4.46 10E12/L (ref 3.8–5.2)
SODIUM SERPL-SCNC: 140 MMOL/L (ref 133–144)
TRIGL SERPL-MCNC: 248 MG/DL
WBC # BLD AUTO: 10.9 10E9/L (ref 4–11)

## 2018-04-18 PROCEDURE — 83690 ASSAY OF LIPASE: CPT | Performed by: INTERNAL MEDICINE

## 2018-04-18 PROCEDURE — 99214 OFFICE O/P EST MOD 30 MIN: CPT | Performed by: INTERNAL MEDICINE

## 2018-04-18 PROCEDURE — 36415 COLL VENOUS BLD VENIPUNCTURE: CPT | Performed by: INTERNAL MEDICINE

## 2018-04-18 PROCEDURE — 85025 COMPLETE CBC W/AUTO DIFF WBC: CPT | Performed by: INTERNAL MEDICINE

## 2018-04-18 PROCEDURE — 80053 COMPREHEN METABOLIC PANEL: CPT | Performed by: INTERNAL MEDICINE

## 2018-04-18 PROCEDURE — 80061 LIPID PANEL: CPT | Performed by: INTERNAL MEDICINE

## 2018-04-18 NOTE — PROGRESS NOTES
SUBJECTIVE:   Shae Pierre is a 53 year old female who presents to clinic today for the following health issues:    Chief Complaint   Patient presents with     Abdominal Pain     Pt is fasting.     Hypertension     Back Pain     Neck       Hypertension Follow-up      Outpatient blood pressures are being checked at [facility] (for her colonoscopy) .  Results are 163/97.    Low Salt Diet: not monitoring salt    Amount of exercise or physical activity: 4-5 days/week for an average of 45-60 minutes    Problems taking medications regularly: No    Medication side effects: none    Diet: regular (no restrictions)    Intermittent elevated BP readings since 2005, more consistent since 2012.  Very high during c-scope.    BP Readings from Last 6 Encounters:   04/18/18 118/71   04/16/18 (!) 163/97   06/26/17 112/74   05/23/17 135/79   08/10/16 154/90   12/03/15 132/85     --father and most siblings have hypertension.    ABDOMINAL PAIN     Onset: Dec/14/2017    Description:   Character: Stabbing and Cramping  Location: epigastric region divya-umbilical region suprapubic region  Radiation: from lower divya-umbilical to epigastric    Intensity: severe, 9/10 at worst , Right now 0/10    Progression of Symptoms:  worsening and intermittent    Accompanying Signs & Symptoms:  Fever/Chills?: YES- off and on  Gas/Bloating: no   Nausea: YES  Vomitting: YES- severe  Diarrhea?: no   Constipation:no   Dysuria or Hematuria: no    History:   Trauma: no   Previous similar pain: no    Previous tests done: none    Precipitating factors:   Does the pain change with:     Food: no      BM: not sure    Urination: not sure    Alleviating factors:  na    Therapies Tried and outcome: na    LMP:  not applicable     --hasn't been seen for this complaint.  --feels like 'tearing' in epigastric and umbilical area  --pain can be severe at times, causing vomiting.  The vomiting seems to alleviate the pain somewhat.  The emesis is pink, hasn't noted blood  --pain  is monthly or so.  Can last 10 hours or so.  Estimates she has had about 8 episodes.  --last episode was around 3/15.  --when present, nothing seems to worsen the pain  --has tried heat, ice, Tums, rolaids - no relief  --food seems to worsen the pain, but fasting doesn't improve the pain.  --she has no symptoms in between episodes  --subjective fevers/chills associated with pain episodes, but she hasn't checked temperature.    --has chronic constipation, unchanged leading up to and after episodes.  She does think having bowel movement improves pain  --hasn't been able to correlate episodes with spicy foods    Neck Pain  Onset: 3 week    Description:   Location: both sides of the neck - very stiff  Radiation: into the back of the head    Intensity: moderate, 5 to 6/10 at worst - mild 2/10 current    Progression of Symptoms:  worsening and intermittent    Accompanying Signs & Symptoms:  Burning, prickly sensation (paresthesias) in arm(s): Tingling  Numbness in arm(s): no   Weakness in arm(s):  no   Fever: YES- somewhat - might be related to other issues above  Headache: YES  Nausea and/or vomiting: YES    History:   Trauma: no   Previous neck pain: YES- years ago  Previous surgery or injections: no   Previous Imaging (MRI,X ray): no     Precipitating factors:   Does movement increase the pain:  YES    Alleviating factors:na    Therapies Tried and outcome:  na        Current Outpatient Prescriptions   Medication Sig Dispense Refill     estradiol (ESTRACE) 1 MG tablet Take 1 tablet (1 mg) by mouth daily 90 tablet 3     MULTI-VITAMIN OR TABS 1 TABLET DAILY 30 0     ADVIL 200 MG OR TABS TAKE 1 TO 2 TABLETS EVERY 4 TO 6 HOURS AS NEEDED WITH FOOD 120 0     SUMAtriptan (IMITREX) 100 MG tablet Take 1 tablet (100 mg) by mouth at onset of headache for migraine May repeat in 2 hours prn: max 2/day (Patient not taking: Reported on 4/18/2018) 12 tablet 4     triamcinolone (KENALOG) 0.1 % cream Apply sparingly to affected area two  times daily as needed. (Patient not taking: Reported on 4/18/2018) 60 g 1       Reviewed and updated as needed this visit by clinical staff  Tobacco  Allergies  Meds  Problems  Med Hx  Surg Hx  Fam Hx  Soc Hx        Reviewed and updated as needed this visit by Provider  Allergies  Meds  Problems         ROS:  Constitutional, HEENT, cardiovascular, pulmonary, gi and gu systems are negative, except as otherwise noted.    OBJECTIVE:     /71 (BP Location: Right arm, Patient Position: Chair, Cuff Size: Adult Large)  Pulse 75  Temp 98.6  F (37  C) (Tympanic)  Wt 200 lb (90.7 kg)  LMP 04/05/2012  SpO2 96%  Breastfeeding? No  BMI 39.06 kg/m2  Body mass index is 39.06 kg/(m^2).  GENERAL APPEARANCE: healthy, alert, no distress and over weight  NECK: no adenopathy, no asymmetry, masses, or scars and thyroid normal to palpation  RESP: lungs clear to auscultation - no rales, rhonchi or wheezes  CV: regular rates and rhythm, normal S1 S2, no S3 or S4 and no murmur, click or rub  ABDOMEN: soft, nontender, without hepatosplenomegaly or masses, bowel sounds normal and obese    Diagnostic Test Results:  No results found for this or any previous visit (from the past 24 hour(s)).  Results for orders placed or performed during the hospital encounter of 04/16/18   COLONOSCOPY   Result Value Ref Range    COLONOSCOPY       _______________________________________________________________________________  Patient Name: Shae Pierre            Procedure Date: 4/16/2018 10:35 AM  MRN: [id number]                       YOB: 1965  Admit Type: Outpatient                Age: 53  Gender: Female                        Attending MD: Chuck Donovan MD  Total Sedation Time:                    _______________________________________________________________________________     Procedure:           Colonoscopy  Indications:         Screening for colorectal malignant neoplasm  Providers:           Chuck Donovan MD,  Lucy Santos, RN  Referring MD:        Amy Judd MD  Medicines:           Propofol per Anesthesia  Complications:       No immediate complications.  _______________________________________________________________________________  Procedure:           After obtaining informed consent, the colonoscope was                        passed under direct vision. Throughout the procedure,                         the patient's blood pressure, pulse, and oxygen                        saturations were monitored continuously. The Colonoscope                        was introduced through the anus and advanced to the                        cecum, identified by appendiceal orifice and ileocecal                        valve. The colonoscopy was performed without difficulty.                        The patient tolerated the procedure well. The quality of                        the bowel preparation was good.                                                                                   Findings:       The perianal and digital rectal examinations were normal.       Multiple medium-mouthed diverticula were found in the entire colon.       Non-bleeding internal hemorrhoids were found during retroflexion. The        hemorrhoids were mild.       There is no endoscopic evidence of polyps in the entire colon.                                                                                    Impression:          - Diverticulosis in the entire examined colon.                       - Non-bleeding internal hemorrhoids.                       - No specimens collected.  Recommendation:      - Discharge patient to home.                       - High fiber diet.                       - Repeat colonoscopy in 10 years for screening purposes.                                                                                     Signed electronically by Chuck Donovan M.D.  _________________  Chuck Donovan MD  4/16/2018 11:01:22 AM  I was  physically present for the entire viewing portion of the exam.  [id number] Donovan MD  Number of Addenda: 0    Note Initiated On: 4/16/2018 10:35 AM  Scope In: 10:50:52 AM  Scope Out: 11:01:50 AM     XR Chest Port 1 View    Narrative    XR CHEST PORT 1 VW 4/16/2018 12:17 PM    HISTORY: Chest pain following colonoscopy.    COMPARISON: 4/22/2011.      Impression    IMPRESSION: A single view the chest shows no acute cardiopulmonary  disease. There is no free peritoneal air below the diaphragm.     MARIANNE PICKARD MD       ASSESSMENT/PLAN:       1. Benign essential hypertension - new diagnosis.  Discussed DASH diet, exercise, weight loss.  Monitor BP as outpatient, bring log and recheck in 3-6 months.  If still elevated, would start lisinopril/hctz.    2. Abdominal pain, epigastric - unclear cause given patient is not having symptoms currently. Differential includes gastritis, ulcerative disease, biliary colic, pancreatitis, AAA, IBS, other. Start with labs as below. Due to her description of tearing pain along with elevated blood pressure, we'll get a CT to rule out AAA. Advised patient to be seen during her next episode to determine the etiology.  - CT Abdomen Pelvis w Contrast; Future  - Comprehensive metabolic panel  - CBC with platelets and differential  - Lipase    3. CARDIOVASCULAR SCREENING; LDL GOAL LESS THAN 130  - Lipid Profile (Chol, Trig, HDL, LDL calc)    1. You have hypertension.  Work on lifestyle changes - low salt diet (DASH diet), increasing exercise.  Aim for 30 min of brisk exercise most days of the week.  5-10% weight loss 10-20 lbs, can sometimes help.  Getting close to ideal body weight (150 lbs would still be overweight, but no longer in obese category) could be helpful too.  Strong family history of hypertension - make sit more likely you have hypertension.  We should recheck blood pressure in 3-6 months - determine if you need to start medications at that time.  2. Blood work today  3. Radiology  test was ordered -CT abdomen.  Please call [phone] to schedule.  4. Be seen during next episode of severe pain - either in clinic or in the ER/Urgent Care.  Difficult to diagnose during a pain free episode.        Return to clinic to discuss neck pain further      Patience Barrett,   [facility]

## 2018-04-18 NOTE — PATIENT INSTRUCTIONS
1. You have hypertension.  Work on lifestyle changes - low salt diet (DASH diet), increasing exercise.  Aim for 30 min of brisk exercise most days of the week.  5-10% weight loss 10-20 lbs, can sometimes help.  Getting close to ideal body weight (150 lbs would still be overweight, but no longer in obese category) could be helpful too.  Strong family history of hypertension - make sit more likely you have hypertension.  We should recheck blood pressure in 3-6 months - determine if you need to start medications at that time.  2. Blood work today  3. Radiology test was ordered -CT abdomen.  Please call [phone] to schedule.  4. Be seen during next episode of severe pain - either in clinic or in the ER/Urgent Care.  Difficult to diagnose during a pain free episode.        Return to clinic to discuss neck pain further      Discharge Instructions: Eating a Low-Salt Diet  Your healthcare provider has prescribed a low-salt diet for you. Most people with heart problems need to eat less salt, which is full of sodium. Too much sodium is linked to high blood pressure, which is linked to a greater risk of heart disease, stroke, blindness, and kidney problems.  Home care    Learn ways to cut back on salt (sodium):    Eat less frozen, canned, dried, packaged, and fast foods. These often contain high amounts of sodium.    Season foods with herbs instead of salt when you cook.    Season with flavorings such as pepper, lemon, garlic, and onion.    Don t add salt to your food at the table.    Sprinkle salt-free herbal blends on meats and vegetables.  Learn to read food labels carefully:    Look for the total amount of sodium per serving.    Look for foods labeled low sodium, reduced sodium, or no added salt.    Beware. Salt goes by many names. Cut down on foods with these words (all forms of salt) listed as ingredients:    Salt    Sodium    Soy sauce    Baking soda    Baking powder    MSG (monosodium glutamate)    Monosodium    Na  (the chemical symbol for sodium)  Other ideas:    Use more fresh food. Buy more fruits and vegetables.    Select lean meats, fish, and poultry.    Find a cookbook with low-salt recipes. You ll find ideas for tasty meals that are healthy for your heart.    When eating out, ask questions about the menu. Tell the  you're on a low-salt diet.    If you order fish, chicken, beef, or pork, ask to have it broiled, baked, poached, or grilled without salt, butter, or breading.    Choose plain steamed rice, boiled noodles, and baked or boiled potatoes. Top potatoes with chives and a little sour cream instead of butter.    Avoid antacids that are high in salt. Check the label before you buy.  Follow-up  Make a follow-up appointment with your healthcare provider, or as advised. Your provider may refer you to a dietitian.   Date Last Reviewed: 6/1/2017 2000-2017 [employer]. [address] [place] [address]. All rights reserved. This information is not intended as a substitute for professional medical care. Always follow your healthcare professional's instructions.        Your High Blood Pressure Risk Factors  Risk factors are things that make you more likely to have a disease or condition. Do you know your risk factors for high blood pressure? You can t do anything about some risk factors. But other risk factors are things that can be changed. Know what high blood pressure risk factors you have. Then find out what changes you can make to help control your risk for high blood pressure. Start with the change that you think will be easiest for you.  Risk factors you can t control  Though you can t change any of the things listed below, check off the ones that apply to you. The more boxes you check, the greater your risk for high blood pressure.  Family history  ? One or both of your parents or grandparents has had high blood pressure or heart disease.  Gender and age  ? You re a man over age 55 or a  postmenopausal woman.  Risk factors you can control  There are plenty of risk factors for high blood pressure that you can control. Learn what these risk factors are and then find out how to reduce your risk. Check the ones that apply to you.  ? What you eat  Do you eat a lot of salty, fatty, fried, or greasy foods? Do you go to restaurants or eat out frequently?  ? Alcohol consumption  Do you drink more than 1 drink a day if you are a female or more than 2 drinks a day if you male?   ? If you smoke or someone close to you smokes  Do you smoke cigarettes or cigars, chew tobacco, or dip snuff? Are you exposed to second-hand smoke on a regular basis?  ? How active you are   Are you inactive most of the time at work and at home? Do you go weeks without exercising?  ? Your weight   Has your doctor said that you are 15 or more pounds overweight?  ? Your stress level    Do you often feel anxious, nervous, and stressed? Do you feel that you don't have a supportive environment?  Date Last Reviewed: 4/27/2016 2000-2017 [employer]. [address] [place] [address]. All rights reserved. This information is not intended as a substitute for professional medical care. Always follow your healthcare professional's instructions.        Controlling High Blood Pressure  High blood pressure (hypertension) is often called the silent killer. This is because many people who have it don t know it. High blood pressure is defined as 140/90 mm Hg or higher. Know your blood pressure and remember to check it regularly. Doing so can save your life. Here are some things you can do to help control your blood pressure.    Choose heart-healthy foods    Select low-salt, low-fat foods. Limit sodium intake to 2,400 mg per day or the amount suggested by your healthcare provider.    Limit canned, dried, cured, packaged, and fast foods. These can contain a lot of salt.    Eat 8 to 10 servings of fruits and vegetables every  day.    Choose lean meats, fish, or chicken.    Eat whole-grain pasta, brown rice, and beans.    Eat 2 to 3 servings of low-fat or fat-free dairy products.    Ask your doctor about the DASH eating plan. This plan helps reduce blood pressure.    When you go to a restaurant, ask that your meal be prepared with no added salt.  Maintain a healthy weight    Ask your healthcare provider how many calories to eat a day. Then stick to that number.    Ask your healthcare provider what weight range is healthiest for you. If you are overweight, a weight loss of only 3% to 5% of your body weight can help lower blood pressure. Generally, a good weight loss goal is to lose 10% of your body weight in a year.    Limit snacks and sweets.    Get regular exercise.  Get up and get active    Choose activities you enjoy. Find ones you can do with friends or family. This includes bicycling, dancing, walking, and jogging.    Park farther away from building entrances.    Use stairs instead of the elevator.    When you can, walk or bike instead of driving.    [place] leaves, garden, or do household repairs.    Be active at a moderate to vigorous level of physical activity for at least 40 minutes for a minimum of 3 to 4 days a week.   Manage stress    Make time to relax and enjoy life. Find time to laugh.    Communicate your concerns with your loved ones and your healthcare provider.    Visit with family and friends, and keep up with hobbies.  Limit alcohol and quit smoking    Men should have no more than 2 drinks per day.    Women should have no more than 1 drink per day.    Talk with your healthcare provider about quitting smoking. Smoking significantly increases your risk for heart disease and stroke. Ask your healthcare provider about community smoking cessation programs and other options.  Medicines  If lifestyle changes aren t enough, your healthcare provider may prescribe high blood pressure medicine. Take all medicines as prescribed. If  you have any questions about your medicines, ask your healthcare provider before stopping or changing them.   Date Last Reviewed: 4/27/2016 2000-2017 [employer]. [address] [place] [address], [place] [address]. All rights reserved. This information is not intended as a substitute for professional medical care. Always follow your healthcare professional's instructions.

## 2018-04-18 NOTE — LETTER
April 18, 2018      Shae Pierre  [id number] [address] [place] [id number]        Dear ,    We are writing to inform you of your test results.    Kidney, pancreas and liver function, blood sugar, electrolytes are normal.  Cholesterol is mildly elevated. Blood count normal. Continue with plan of care discussed during office visit.    Resulted Orders   Comprehensive metabolic panel   Result Value Ref Range    Sodium 140 133 - 144 mmol/L    Potassium 4.1 3.4 - 5.3 mmol/L    Chloride 109 94 - 109 mmol/L    Carbon Dioxide 25 20 - 32 mmol/L    Anion Gap 6 3 - 14 mmol/L    Glucose 88 70 - 99 mg/dL    Urea Nitrogen 9 7 - 30 mg/dL    Creatinine 0.60 0.52 - 1.04 mg/dL    GFR Estimate >90 >60 mL/min/1.7m2      Comment:      Non  GFR Calc    GFR Estimate If Black >90 >60 mL/min/1.7m2      Comment:       GFR Calc    Calcium 8.4 (L) 8.5 - 10.1 mg/dL    Bilirubin Total 0.3 0.2 - 1.3 mg/dL    Albumin 3.7 3.4 - 5.0 g/dL    Protein Total 7.1 6.8 - 8.8 g/dL    Alkaline Phosphatase 96 40 - 150 U/L    ALT 34 0 - 50 U/L    AST 26 0 - 45 U/L   CBC with platelets and differential   Result Value Ref Range    WBC 10.9 4.0 - 11.0 10e9/L    RBC Count 4.46 3.8 - 5.2 10e12/L    Hemoglobin 14.2 11.7 - 15.7 g/dL    Hematocrit 43.2 35.0 - 47.0 %    MCV 97 78 - 100 fl    MCH 31.8 26.5 - 33.0 pg    MCHC 32.9 31.5 - 36.5 g/dL    RDW 12.9 10.0 - 15.0 %    Platelet Count 334 150 - 450 10e9/L    Diff Method Automated Method     % Neutrophils 60.7 %    % Lymphocytes 27.0 %    % Monocytes 8.4 %    % Eosinophils 3.3 %    % Basophils 0.6 %    Absolute Neutrophil 6.6 1.6 - 8.3 10e9/L    Absolute Lymphocytes 3.0 0.8 - 5.3 10e9/L    Absolute Monocytes 0.9 0.0 - 1.3 10e9/L    Absolute Eosinophils 0.4 0.0 - 0.7 10e9/L    Absolute Basophils 0.1 0.0 - 0.2 10e9/L   Lipase   Result Value Ref Range    Lipase 159 73 - 393 U/L   Lipid Profile (Chol, Trig, HDL, LDL calc)   Result Value Ref Range    Cholesterol 216 (H) <200 mg/dL       Comment:      Desirable:       <200 mg/dl    Triglycerides 248 (H) <150 mg/dL      Comment:      Borderline high:  150-199 mg/dl  High:             200-499 mg/dl  Very high:       >499 mg/dl      HDL Cholesterol 50 >49 mg/dL    LDL Cholesterol Calculated 116 (H) <100 mg/dL      Comment:      Above desirable:  100-129 mg/dl  Borderline High:  130-159 mg/dL  High:             160-189 mg/dL  Very high:       >189 mg/dl      Non HDL Cholesterol 166 (H) <130 mg/dL      Comment:      Above Desirable:  130-159 mg/dl  Borderline high:  160-189 mg/dl  High:             190-219 mg/dl  Very high:       >219 mg/dl                       If you have any questions or concerns, please call the clinic at the number listed above.       Sincerely,        Patience Barrett, DO

## 2018-04-18 NOTE — MR AVS SNAPSHOT
After Visit Summary   4/18/2018    Shae Pierre    MRN: [id number]           Patient Information     Date Of Birth          1965        Visit Information        Provider Department      4/18/2018 7:20 AM Patience Barrett, DO [facility]        Today's Diagnoses     Benign essential hypertension    -  1    Abdominal pain, epigastric        CARDIOVASCULAR SCREENING; LDL GOAL LESS THAN 130          Care Instructions    1. You have hypertension.  Work on lifestyle changes - low salt diet (DASH diet), increasing exercise.  Aim for 30 min of brisk exercise most days of the week.  5-10% weight loss 10-20 lbs, can sometimes help.  Getting close to ideal body weight (150 lbs would still be overweight, but no longer in obese category) could be helpful too.  Strong family history of hypertension - make sit more likely you have hypertension.  We should recheck blood pressure in 3-6 months - determine if you need to start medications at that time.  2. Blood work today  3. Radiology test was ordered -CT abdomen.  Please call [phone] to schedule.  4. Be seen during next episode of severe pain - either in clinic or in the ER/Urgent Care.  Difficult to diagnose during a pain free episode.        Return to clinic to discuss neck pain further      Discharge Instructions: Eating a Low-Salt Diet  Your healthcare provider has prescribed a low-salt diet for you. Most people with heart problems need to eat less salt, which is full of sodium. Too much sodium is linked to high blood pressure, which is linked to a greater risk of heart disease, stroke, blindness, and kidney problems.  Home care    Learn ways to cut back on salt (sodium):    Eat less frozen, canned, dried, packaged, and fast foods. These often contain high amounts of sodium.    Season foods with herbs instead of salt when you cook.    Season with flavorings such as pepper, lemon, garlic, and onion.    Don t add salt to your food at the  table.    Sprinkle salt-free herbal blends on meats and vegetables.  Learn to read food labels carefully:    Look for the total amount of sodium per serving.    Look for foods labeled low sodium, reduced sodium, or no added salt.    Beware. Salt goes by many names. Cut down on foods with these words (all forms of salt) listed as ingredients:    Salt    Sodium    Soy sauce    Baking soda    Baking powder    MSG (monosodium glutamate)    Monosodium    Na (the chemical symbol for sodium)  Other ideas:    Use more fresh food. Buy more fruits and vegetables.    Select lean meats, fish, and poultry.    Find a cookbook with low-salt recipes. You ll find ideas for tasty meals that are healthy for your heart.    When eating out, ask questions about the menu. Tell the  you're on a low-salt diet.    If you order fish, chicken, beef, or pork, ask to have it broiled, baked, poached, or grilled without salt, butter, or breading.    Choose plain steamed rice, boiled noodles, and baked or boiled potatoes. Top potatoes with chives and a little sour cream instead of butter.    Avoid antacids that are high in salt. Check the label before you buy.  Follow-up  Make a follow-up appointment with your healthcare provider, or as advised. Your provider may refer you to a dietitian.   Date Last Reviewed: 6/1/2017 2000-2017 [employer]. [address] [place] [address], [place] [address]. All rights reserved. This information is not intended as a substitute for professional medical care. Always follow your healthcare professional's instructions.        Your High Blood Pressure Risk Factors  Risk factors are things that make you more likely to have a disease or condition. Do you know your risk factors for high blood pressure? You can t do anything about some risk factors. But other risk factors are things that can be changed. Know what high blood pressure risk factors you have. Then find out what changes you can make to help  control your risk for high blood pressure. Start with the change that you think will be easiest for you.  Risk factors you can t control  Though you can t change any of the things listed below, check off the ones that apply to you. The more boxes you check, the greater your risk for high blood pressure.  Family history  ? One or both of your parents or grandparents has had high blood pressure or heart disease.  Gender and age  ? You re a man over age 55 or a postmenopausal woman.  Risk factors you can control  There are plenty of risk factors for high blood pressure that you can control. Learn what these risk factors are and then find out how to reduce your risk. Check the ones that apply to you.  ? What you eat  Do you eat a lot of salty, fatty, fried, or greasy foods? Do you go to restaurants or eat out frequently?  ? Alcohol consumption  Do you drink more than 1 drink a day if you are a female or more than 2 drinks a day if you male?   ? If you smoke or someone close to you smokes  Do you smoke cigarettes or cigars, chew tobacco, or dip snuff? Are you exposed to second-hand smoke on a regular basis?  ? How active you are   Are you inactive most of the time at work and at home? Do you go weeks without exercising?  ? Your weight   Has your doctor said that you are 15 or more pounds overweight?  ? Your stress level    Do you often feel anxious, nervous, and stressed? Do you feel that you don't have a supportive environment?  Date Last Reviewed: 4/27/2016 2000-2017 [employer]. [address] [place] [address], [place] [address]. All rights reserved. This information is not intended as a substitute for professional medical care. Always follow your healthcare professional's instructions.        Controlling High Blood Pressure  High blood pressure (hypertension) is often called the silent killer. This is because many people who have it don t know it. High blood pressure is defined as 140/90 mm Hg or higher. Know  your blood pressure and remember to check it regularly. Doing so can save your life. Here are some things you can do to help control your blood pressure.    Choose heart-healthy foods    Select low-salt, low-fat foods. Limit sodium intake to 2,400 mg per day or the amount suggested by your healthcare provider.    Limit canned, dried, cured, packaged, and fast foods. These can contain a lot of salt.    Eat 8 to 10 servings of fruits and vegetables every day.    Choose lean meats, fish, or chicken.    Eat whole-grain pasta, brown rice, and beans.    Eat 2 to 3 servings of low-fat or fat-free dairy products.    Ask your doctor about the DASH eating plan. This plan helps reduce blood pressure.    When you go to a restaurant, ask that your meal be prepared with no added salt.  Maintain a healthy weight    Ask your healthcare provider how many calories to eat a day. Then stick to that number.    Ask your healthcare provider what weight range is healthiest for you. If you are overweight, a weight loss of only 3% to 5% of your body weight can help lower blood pressure. Generally, a good weight loss goal is to lose 10% of your body weight in a year.    Limit snacks and sweets.    Get regular exercise.  Get up and get active    Choose activities you enjoy. Find ones you can do with friends or family. This includes bicycling, dancing, walking, and jogging.    Park farther away from building entrances.    Use stairs instead of the elevator.    When you can, walk or bike instead of driving.    Enloe leaves, garden, or do household repairs.    Be active at a moderate to vigorous level of physical activity for at least 40 minutes for a minimum of 3 to 4 days a week.   Manage stress    Make time to relax and enjoy life. Find time to laugh.    Communicate your concerns with your loved ones and your healthcare provider.    Visit with family and friends, and keep up with hobbies.  Limit alcohol and quit smoking    Men should have no  more than 2 drinks per day.    Women should have no more than 1 drink per day.    Talk with your healthcare provider about quitting smoking. Smoking significantly increases your risk for heart disease and stroke. Ask your healthcare provider about community smoking cessation programs and other options.  Medicines  If lifestyle changes aren t enough, your healthcare provider may prescribe high blood pressure medicine. Take all medicines as prescribed. If you have any questions about your medicines, ask your healthcare provider before stopping or changing them.   Date Last Reviewed: 4/27/2016 2000-2017 [employer]. [address] [place] [address]. All rights reserved. This information is not intended as a substitute for professional medical care. Always follow your healthcare professional's instructions.                Follow-ups after your visit        Future tests that were ordered for you today     Open Future Orders        Priority Expected Expires Ordered    CT Abdomen Pelvis w Contrast Routine  4/18/2019 4/18/2018            Who to contact     If you have questions or need follow up information about today's clinic visit or your schedule please contact [facility] directly at [phone].  Normal or non-critical lab and imaging results will be communicated to you by [employer], letter or phone within 4 business days after the clinic has received the results. If you do not hear from us within 7 days, please contact the clinic through [employer] or phone. If you have a critical or abnormal lab result, we will notify you by phone as soon as possible.  Submit refill requests through [employer] or call your pharmacy and they will forward the refill request to us. Please allow 3 business days for your refill to be completed.          Additional Information About Your Visit        [employer] Information     [employer] lets you send messages to your doctor, view your test results, renew your  "prescriptions, schedule appointments and more. To sign up, go to [email or web address] . Click on \"Log in\" on the left side of the screen, which will take you to the Welcome page. Then click on \"Sign up Now\" on the right side of the page.     You will be asked to enter the access code listed below, as well as some personal information. Please follow the directions to create your username and password.     Your access code is: [email or web address]  Expires: 2018  8:00 AM     Your access code will  in 90 days. If you need help or a new code, please call your [facility] clinic or [phone].        Care EveryWhere ID     This is your Care EveryWhere ID. This could be used by other organizations to access your [facility] medical records  [id number]        Your Vitals Were     Pulse Temperature Last Period Pulse Oximetry Breastfeeding? BMI (Body Mass Index)    75 98.6  F (37  C) (Tympanic) 2012 96% No 39.06 kg/m2       Blood Pressure from Last 3 Encounters:   18 118/71   18 (!) 163/97   17 112/74    Weight from Last 3 Encounters:   18 200 lb (90.7 kg)   18 196 lb (88.9 kg)   17 194 lb (88 kg)              We Performed the Following     CBC with platelets and differential     Comprehensive metabolic panel     Lipase     Lipid Profile (Chol, Trig, HDL, LDL calc)        Primary Care Provider Office Phone # Fax #    Amydra Vick Judd [phone] [address], [place] [address]        Equal Access to Services     JAMAL DUARTE : Hadpat Sheehan, radha cleary, cayla griffiths. So [facility] [phone].    ATENCIÓN: Si habla español, tiene a metcalf disposición servicios gratuitos de asistencia lingüística. Llame al [phone].    We comply with applicable federal civil rights laws and [place] laws. We do not discriminate on the basis of race, color, national origin, " age, disability, sex, sexual orientation, or gender identity.            Thank you!     Thank you for choosing [facility]  for your care. Our goal is always to provide you with excellent care. Hearing back from our patients is one way we can continue to improve our services. Please take a few minutes to complete the written survey that you may receive in the mail after your visit with us. Thank you!             Your Updated Medication List - Protect others around you: Learn how to safely use, store and throw away your medicines at www.disposemymeds.org.          This list is accurate as of 4/18/18  8:00 AM.  Always use your most recent med list.                   Brand Name Dispense Instructions for use Diagnosis    ADVIL 200 MG tablet   Generic drug:  ibuprofen     120    TAKE 1 TO 2 TABLETS EVERY 4 TO 6 HOURS AS NEEDED WITH FOOD        estradiol 1 MG tablet    ESTRACE    90 tablet    Take 1 tablet (1 mg) by mouth daily    Symptomatic menopausal or female climacteric states       Multi-vitamin Tabs tablet   Generic drug:  multivitamin, therapeutic with minerals     30    1 TABLET DAILY        SUMAtriptan 100 MG tablet    IMITREX    12 tablet    Take 1 tablet (100 mg) by mouth at onset of headache for migraine May repeat in 2 hours prn: max 2/day    Migraine with aura and without status migrainosus, not intractable       triamcinolone 0.1 % cream    KENALOG    60 g    Apply sparingly to affected area two times daily as needed.    Dermatitis

## 2018-04-18 NOTE — NURSING NOTE
Chief Complaint   Patient presents with     Abdominal Pain     Pt is fasting.     Hypertension     Back Pain     Neck       Initial /71 (BP Location: Right arm, Patient Position: Chair, Cuff Size: Adult Large)  Pulse 75  Temp 98.6  F (37  C) (Tympanic)  Wt 200 lb (90.7 kg)  LMP 04/05/2012  SpO2 96%  Breastfeeding? No  BMI 39.06 kg/m2 Estimated body mass index is 39.06 kg/(m^2) as calculated from the following:    Height as of 4/16/18: 5' (1.524 m).    Weight as of this encounter: 200 lb (90.7 kg).  Medication Reconciliation: complete   Amanda Melchor CMA (DAYNA)   (aka: Elda Melchor)

## 2018-04-18 NOTE — PROGRESS NOTES
Kidney, pancreas and liver function, blood sugar, electrolytes are normal.  Cholesterol is mildly elevated. Blood count normal. Continue with plan of care discussed during office visit

## 2018-07-16 DIAGNOSIS — N95.1 SYMPTOMATIC MENOPAUSAL OR FEMALE CLIMACTERIC STATES: ICD-10-CM

## 2018-07-16 RX ORDER — ESTRADIOL 1 MG/1
1 TABLET ORAL DAILY
Qty: 90 TABLET | Refills: 0 | Status: SHIPPED | OUTPATIENT
Start: 2018-07-16 | End: 2018-10-18

## 2018-07-16 NOTE — TELEPHONE ENCOUNTER
6/2017 last mammo  Last clinic appointment 6/26/17    Future appointment currently not scheduled.  Janette davis x1.  Attempted to reach patient by phone to notify patient of need for annual visit.   Left message for patient to return call to clinic.      BP Readings from Last 3 Encounters:   04/18/18 118/71   04/16/18 (!) 163/97   06/26/17 112/74     Bushra Lance   [facility]  RN

## 2018-07-16 NOTE — LETTER
[facility]  [address]  [facility] [place] [address]  Phone: [phone]    July 18, 2018    Shae Pierre                                                                                                                      [id number] [address] [place] [id number]            Dear Ms. Pierre,    We are concerned about your health care.  We recently provided you with a medication refill.  Many medications require routine follow-up with your OB/gyn provider.      At this time we ask that: You schedule a routine office visit with your OB/gyn physician for your annual exam. You were last seen over 1 year ago on 6-26-17.    Your prescription: Has been refilled for 3 months so you may have time for the above noted follow-up. Please be seen prior to needing your next refill of medication.     We are currently booking appointments several weeks in advance.    Thank you,      Amy Judd MD/ [facility]

## 2018-10-18 DIAGNOSIS — N95.1 SYMPTOMATIC MENOPAUSAL OR FEMALE CLIMACTERIC STATES: ICD-10-CM

## 2018-10-18 RX ORDER — ESTRADIOL 1 MG/1
1 TABLET ORAL DAILY
Qty: 90 TABLET | Refills: 0 | Status: SHIPPED | OUTPATIENT
Start: 2018-10-18 | End: 2018-11-05

## 2018-10-18 NOTE — TELEPHONE ENCOUNTER
Rx refill request for Estradiol.  Last issued on 07-16-18 for a qty of 90 with 0 refills.  Pt has future appt scheduled on 11 05-18 with Dr. Judd.  Will authorized thuy refill.    Roselia Krishnamurthy  [facility] RN

## 2018-11-05 ENCOUNTER — OFFICE VISIT (OUTPATIENT)
Dept: OBGYN | Facility: CLINIC | Age: 53
End: 2018-11-05
Payer: COMMERCIAL

## 2018-11-05 VITALS
TEMPERATURE: 97.3 F | HEART RATE: 92 BPM | HEIGHT: 62 IN | WEIGHT: 200 LBS | DIASTOLIC BLOOD PRESSURE: 82 MMHG | BODY MASS INDEX: 36.8 KG/M2 | SYSTOLIC BLOOD PRESSURE: 125 MMHG

## 2018-11-05 DIAGNOSIS — N39.41 URGE INCONTINENCE OF URINE: ICD-10-CM

## 2018-11-05 DIAGNOSIS — G43.109 MIGRAINE WITH AURA AND WITHOUT STATUS MIGRAINOSUS, NOT INTRACTABLE: ICD-10-CM

## 2018-11-05 DIAGNOSIS — Z01.411 ENCOUNTER FOR GYNECOLOGICAL EXAMINATION WITH ABNORMAL FINDING: Primary | ICD-10-CM

## 2018-11-05 DIAGNOSIS — N95.1 SYMPTOMATIC MENOPAUSAL OR FEMALE CLIMACTERIC STATES: ICD-10-CM

## 2018-11-05 DIAGNOSIS — L71.9 ROSACEA: ICD-10-CM

## 2018-11-05 DIAGNOSIS — B37.2 YEAST INFECTION OF THE SKIN: ICD-10-CM

## 2018-11-05 PROCEDURE — 99396 PREV VISIT EST AGE 40-64: CPT | Performed by: OBSTETRICS & GYNECOLOGY

## 2018-11-05 RX ORDER — ESTRADIOL 1 MG/1
1 TABLET ORAL DAILY
Qty: 90 TABLET | Refills: 3 | Status: SHIPPED | OUTPATIENT
Start: 2018-11-05 | End: 2020-01-17

## 2018-11-05 RX ORDER — FLUCONAZOLE 150 MG/1
150 TABLET ORAL ONCE
Qty: 1 TABLET | Refills: 0 | Status: SHIPPED | OUTPATIENT
Start: 2018-11-05 | End: 2018-11-05

## 2018-11-05 RX ORDER — CLINDAMYCIN PHOSPHATE 10 MG/G
GEL TOPICAL 2 TIMES DAILY
Qty: 60 G | Refills: 1 | Status: SHIPPED | OUTPATIENT
Start: 2018-11-05 | End: 2018-12-12

## 2018-11-05 RX ORDER — GAUZE BANDAGE 4" X 4"
1 BANDAGE TOPICAL 2 TIMES DAILY
COMMUNITY

## 2018-11-05 RX ORDER — FLUCONAZOLE 150 MG/1
150 TABLET ORAL ONCE
Qty: 1 TABLET | Refills: 6 | Status: SHIPPED | OUTPATIENT
Start: 2018-11-05 | End: 2018-11-05

## 2018-11-05 RX ORDER — SOLIFENACIN SUCCINATE 5 MG/1
5 TABLET, FILM COATED ORAL DAILY
Qty: 30 TABLET | Refills: 1 | Status: SHIPPED | OUTPATIENT
Start: 2018-11-05 | End: 2018-12-12

## 2018-11-05 RX ORDER — SUMATRIPTAN 100 MG/1
100 TABLET, FILM COATED ORAL
Qty: 12 TABLET | Refills: 4 | Status: SHIPPED | OUTPATIENT
Start: 2018-11-05 | End: 2021-10-28

## 2018-11-05 NOTE — NURSING NOTE
"Initial /82 (BP Location: Right arm, Patient Position: Chair, Cuff Size: Adult Large)  Pulse 92  Temp 97.3  F (36.3  C) (Tympanic)  Ht 5' 1.5\" (1.562 m)  Wt 200 lb (90.7 kg)  LMP 04/05/2012  Breastfeeding? No  BMI 37.18 kg/m2 Estimated body mass index is 37.18 kg/(m^2) as calculated from the following:    Height as of this encounter: 5' 1.5\" (1.562 m).    Weight as of this encounter: 200 lb (90.7 kg). .    Janeth Ortega    "

## 2018-11-05 NOTE — MR AVS SNAPSHOT
After Visit Summary   11/5/2018    Shae Pierre    MRN: [id number]           Patient Information     Date Of Birth          1965        Visit Information        Provider Department      11/5/2018 2:30 PM Amy Judd MD [facility]        Today's Diagnoses     Encounter for gynecological examination with abnormal finding    -  1    Yeast infection of the skin        Rosacea        Urge incontinence of urine        Symptomatic menopausal or female climacteric states        Migraine with aura and without status migrainosus, not intractable          Care Instructions      Preventive Health Recommendations  Female Ages 50 - 64    Yearly exam: See your health care provider every year in order to  o Review health changes.   o Discuss preventive care.    o Review your medicines if your doctor has prescribed any.      Get a Pap test every three years (unless you have an abnormal result and your provider advises testing more often).    If you get Pap tests with HPV test, you only need to test every 5 years, unless you have an abnormal result.     You do not need a Pap test if your uterus was removed (hysterectomy) and you have not had cancer.    You should be tested each year for STDs (sexually transmitted diseases) if you're at risk.     Have a mammogram every 1 to 2 years.    Have a colonoscopy at age 50, or have a yearly FIT test (stool test). These exams screen for colon cancer.      Have a cholesterol test every 5 years, or more often if advised.    Have a diabetes test (fasting glucose) every three years. If you are at risk for diabetes, you should have this test more often.     If you are at risk for osteoporosis (brittle bone disease), think about having a bone density scan (DEXA).    Shots: Get a flu shot each year. Get a tetanus shot every 10 years.    Nutrition:     Eat at least 5 servings of fruits and vegetables each day.    Eat whole-grain bread, whole-wheat pasta and  brown rice instead of white grains and rice.    Get adequate Calcium and Vitamin D.     Lifestyle    Exercise at least 150 minutes a week (30 minutes a day, 5 days a week). This will help you control your weight and prevent disease.    Limit alcohol to one drink per day.    No smoking.     Wear sunscreen to prevent skin cancer.     See your dentist every six months for an exam and cleaning.    See your eye doctor every 1 to 2 years.            Follow-ups after your visit        Your next 10 appointments already scheduled     Nov 30, 2018 10:30 AM CST   MA SCREENING BILATERAL W/ HUGH with [facility]   [facility] ([facility])    [address] [facility] [address]   [phone]           How do I prepare for my exam? (Food and drink instructions) No Food and Drink Restrictions.  How do I prepare for my exam? (Other instructions) Do not use any powder, lotion or deodorant under your arms or on your breast. If you do, we will ask you to remove it before your exam.  What should I wear: Wear comfortable, two-piece clothing.  How long does the exam take: Most scans will take 15 minutes.  What should I bring: Bring any previous mammograms from other facilities or have them mailed to the breast center.  Do I need a :  No  is needed.  What do I need to tell my doctor: If you have any allergies, tell your care team.  What should I do after the exam: No restrictions, You may resume normal activities.  What is this test: This test is an x-ray of the breast to look for breast disease. The breast is pressed between two plates to flatten and spread the tissue. An X-ray is taken of the breast from different angles.  Who should I call with questions: If you have any questions, please call the Imaging Department where you will have your exam. Directions, parking instructions, and other information is available on our website, [email or web address].  Other information about my exam  "Three-dimensional (3D) mammograms are available at [facility] locations in [place], [place], [place], [place], [facility], [place], and [place].  Health locations include [place] and the [facility] in [place].  Benefits of 3D mammograms include: * Improved rate of cancer detection * Decreases your chance of having to go back for more tests, which means fewer: * \"False-positive\" results (This means that there is an abnormal area but it isn't cancer.) * Invasive testing procedures, such as a biopsy or surgery * Can provide clearer images of the breast if you have dense breast tissue.  *3D mammography is an optional exam that anyone can have with a 2D mammogram. It doesn't replace or take the place of a 2D mammogram. 2D mammograms remain an effective screening test for all women.  Not all insurance companies cover the cost of a 3D mammogram. Check with your insurance.              Who to contact     If you have questions or need follow up information about today's clinic visit or your schedule please contact [facility] directly at [phone].  Normal or non-critical lab and imaging results will be communicated to you by MyChart, letter or phone within 4 business days after the clinic has received the results. If you do not hear from us within 7 days, please contact the clinic through MyChart or phone. If you have a critical or abnormal lab result, we will notify you by phone as soon as possible.  Submit refill requests through [employer] or call your pharmacy and they will forward the refill request to us. Please allow 3 business days for your refill to be completed.          Additional Information About Your Visit        Care EveryWhere ID     This is your Care EveryWhere ID. This could be used by other organizations to access your [facility] medical records  [id number]        Your Vitals Were     Pulse Temperature Height Last Period Breastfeeding? BMI (Body Mass Index)    92 " "97.3  F (36.3  C) (Tympanic) 5' 1.5\" (1.562 m) 04/05/2012 No 37.18 kg/m2       Blood Pressure from Last 3 Encounters:   11/05/18 125/82   04/18/18 118/71   04/16/18 (!) 163/97    Weight from Last 3 Encounters:   11/05/18 200 lb (90.7 kg)   04/18/18 200 lb (90.7 kg)   04/16/18 196 lb (88.9 kg)              Today, you had the following     No orders found for display         Today's Medication Changes          These changes are accurate as of 11/5/18  3:00 PM.  If you have any questions, ask your nurse or doctor.               Start taking these medicines.        Dose/Directions    clindamycin 1 % topical gel   Commonly known as:  CLINDAMAX   Used for:  Rosacea   Started by:  Amy Judd MD        Apply topically 2 times daily   Quantity:  60 g   Refills:  1       fluconazole 150 MG tablet   Commonly known as:  DIFLUCAN   Used for:  Yeast infection of the skin   Started by:  Amy Judd MD        Dose:  150 mg   Take 1 tablet (150 mg) by mouth once for 1 dose   Quantity:  1 tablet   Refills:  6       solifenacin 5 MG tablet   Commonly known as:  VESICARE   Used for:  Urge incontinence of urine   Started by:  Amy Judd MD        Dose:  5 mg   Take 1 tablet (5 mg) by mouth daily   Quantity:  30 tablet   Refills:  1            Where to get your medicines      These medications were sent to [facility] - - [facility] [address] [address]    Hours:  [facility] Phone:  [phone]     clindamycin 1 % topical gel    estradiol 1 MG tablet    fluconazole 150 MG tablet    solifenacin 5 MG tablet    SUMAtriptan 100 MG tablet                Primary Care Provider Fax #    Physician No Ref-Primary [phone]       No address on file        Equal Access to Services     JAMAL DUARTE AH: Barbara Sheehan, waaxda luqadaha, qaybta kaalmajhonny marks, cayla gillette. " So [facility] [phone].    ATENCIÓN: Si davey gregory, tiene a metcalf disposición servicios gratuitos de asistencia lingüística. Dov das [phone].    We comply with applicable federal civil rights laws and [place] laws. We do not discriminate on the basis of race, color, national origin, age, disability, sex, sexual orientation, or gender identity.            Thank you!     Thank you for choosing [facility]  for your care. Our goal is always to provide you with excellent care. Hearing back from our patients is one way we can continue to improve our services. Please take a few minutes to complete the written survey that you may receive in the mail after your visit with us. Thank you!             Your Updated Medication List - Protect others around you: Learn how to safely use, store and throw away your medicines at www.disposemymeds.org.          This list is accurate as of 11/5/18  3:00 PM.  Always use your most recent med list.                   Brand Name Dispense Instructions for use Diagnosis    ADVIL 200 MG tablet   Generic drug:  ibuprofen     120    TAKE 1 TO 2 TABLETS EVERY 4 TO 6 HOURS AS NEEDED WITH FOOD        clindamycin 1 % topical gel    CLINDAMAX    60 g    Apply topically 2 times daily    Rosacea       estradiol 1 MG tablet    ESTRACE    90 tablet    Take 1 tablet (1 mg) by mouth daily    Symptomatic menopausal or female climacteric states       Fish Oil 435 MG Caps           fluconazole 150 MG tablet    DIFLUCAN    1 tablet    Take 1 tablet (150 mg) by mouth once for 1 dose    Yeast infection of the skin       GLUCOSAMINE SULFATE PO      Take 500 mg by mouth daily        Multi-vitamin Tabs tablet   Generic drug:  multivitamin, therapeutic with minerals     30    1 TABLET DAILY        solifenacin 5 MG tablet    VESICARE    30 tablet    Take 1 tablet (5 mg) by mouth daily    Urge incontinence of urine       SUMAtriptan 100 MG tablet    IMITREX    12 tablet    Take 1 tablet (100 mg) by mouth  at onset of headache for migraine May repeat in 2 hours prn: max 2/day    Migraine with aura and without status migrainosus, not intractable

## 2018-11-08 ENCOUNTER — TELEPHONE (OUTPATIENT)
Dept: OBGYN | Facility: CLINIC | Age: 53
End: 2018-11-08

## 2018-11-08 DIAGNOSIS — N39.41 URGE INCONTINENCE OF URINE: Primary | ICD-10-CM

## 2018-11-08 DIAGNOSIS — L71.9 ROSACEA: ICD-10-CM

## 2018-11-08 RX ORDER — METRONIDAZOLE 7.5 MG/G
GEL TOPICAL 2 TIMES DAILY
Qty: 45 G | Refills: 3 | Status: SHIPPED | OUTPATIENT
Start: 2018-11-08 | End: 2018-12-12

## 2018-11-08 RX ORDER — TOLTERODINE 2 MG/1
2 CAPSULE, EXTENDED RELEASE ORAL DAILY
Qty: 90 CAPSULE | Refills: 3 | Status: SHIPPED | OUTPATIENT
Start: 2018-11-08 | End: 2019-02-07

## 2018-11-08 NOTE — TELEPHONE ENCOUNTER
Reason for Call:  Other prescription    Detailed comments: Received request from [facility].  RX for Vesicare 5mg is too expensive for pt.  Pt wondering if there is an alternative?     Also RX for Clindamycin 1% topical gel is to expensive - would like alternative.    Phone Number Patient can be reached at: Pharmacy [phone]    Best Time: any    Can we leave a detailed message on this number? YES    Call taken on 11/8/2018 at 2:51 PM by Shaneka Yo

## 2018-11-30 ENCOUNTER — HOSPITAL ENCOUNTER (OUTPATIENT)
Dept: MAMMOGRAPHY | Facility: CLINIC | Age: 53
Discharge: HOME | End: 2018-11-30
Attending: OBSTETRICS & GYNECOLOGY | Admitting: OBSTETRICS & GYNECOLOGY
Payer: COMMERCIAL

## 2018-11-30 DIAGNOSIS — Z12.31 VISIT FOR SCREENING MAMMOGRAM: ICD-10-CM

## 2018-11-30 PROCEDURE — 77067 SCR MAMMO BI INCL CAD: CPT

## 2018-12-12 ENCOUNTER — OFFICE VISIT (OUTPATIENT)
Dept: FAMILY MEDICINE | Facility: CLINIC | Age: 53
End: 2018-12-12
Payer: COMMERCIAL

## 2018-12-12 VITALS
TEMPERATURE: 98.6 F | WEIGHT: 202.2 LBS | BODY MASS INDEX: 37.59 KG/M2 | OXYGEN SATURATION: 96 % | DIASTOLIC BLOOD PRESSURE: 82 MMHG | SYSTOLIC BLOOD PRESSURE: 136 MMHG | RESPIRATION RATE: 18 BRPM | HEART RATE: 69 BPM

## 2018-12-12 DIAGNOSIS — H60.391 INFECTIVE OTITIS EXTERNA, RIGHT: Primary | ICD-10-CM

## 2018-12-12 PROCEDURE — 99213 OFFICE O/P EST LOW 20 MIN: CPT | Performed by: NURSE PRACTITIONER

## 2018-12-12 RX ORDER — NEOMYCIN SULFATE, POLYMYXIN B SULFATE AND HYDROCORTISONE 10; 3.5; 1 MG/ML; MG/ML; [USP'U]/ML
3 SUSPENSION/ DROPS AURICULAR (OTIC) 4 TIMES DAILY
Qty: 5 ML | Refills: 0 | Status: SHIPPED | OUTPATIENT
Start: 2018-12-12 | End: 2019-06-07

## 2018-12-12 RX ORDER — CIPROFLOXACIN AND DEXAMETHASONE 3; 1 MG/ML; MG/ML
4 SUSPENSION/ DROPS AURICULAR (OTIC) 2 TIMES DAILY
Qty: 2.8 ML | Refills: 0 | Status: SHIPPED | OUTPATIENT
Start: 2018-12-12 | End: 2018-12-12

## 2018-12-12 ASSESSMENT — PAIN SCALES - GENERAL: PAINLEVEL: EXTREME PAIN (8)

## 2018-12-12 NOTE — PROGRESS NOTES
SUBJECTIVE:   Shae Pierre is a 53 year old female who presents to clinic today for the following health issues:      ENT Symptoms             Symptoms: cc Present Absent Comment   Fever/Chills   x    Fatigue   x    Muscle Aches   x    Eye Irritation   x    Sneezing       Nasal Mark Anthony/Drg   x    Sinus Pressure/Pain       Loss of smell   x    Dental pain   x    Sore Throat   x    Swollen Glands   x    Ear Pain/Fullness c x  Right ear   Cough   x    Wheeze   x    Chest Pain   x    Shortness of breath   x    Rash   x    Other         Symptom duration:  4 days   Symptom severity:  Severe   Treatments tried:  IBU, warm heating pad has not been helpful   Contacts:  unknown     No ear problems in the past  Sharp pain - feels like something is in there, pressure  No itching   Ibuprofen not helpful       Problem list and histories reviewed & adjusted, as indicated.  Additional history: as documented    Patient Active Problem List   Diagnosis     Dermatophytosis of nail     Obesity     CARDIOVASCULAR SCREENING; LDL GOAL LESS THAN 160     Migraine headache     Breast cyst     Urge incontinence of urine     Benign essential hypertension     Past Surgical History:   Procedure Laterality Date     COLONOSCOPY N/A 4/16/2018    Procedure: COLONOSCOPY;  Colonoscopy;  Surgeon: Chuck Donovan MD;  Location: [facility]     EXCISE MASS TRUNK  7/13/2012    Procedure: EXCISE MASS TRUNK;  Excisional Removal Chest Wall Cysts X2;  Surgeon: Dennys Henriquez MD;  Location: [facility]     HYSTERECTOMY, PAP NO LONGER INDICATED       LAPAROSCOPIC ASSISTED HYSTERECTOMY VAGINAL, BILATERAL SALPINGO-OOPHORECTOMY, COMBINED  4/5/2012    LAVH-BSO     SURGICAL HISTORY OF -       LEEP     SURGICAL HISTORY OF -       Cryotherapy      SURGICAL HISTORY OF -       Colpo     SURGICAL HISTORY OF -       left knee/patella surgery       Social History     Tobacco Use     Smoking status: Former Smoker     Types: Cigarettes     Last attempt to quit: 4/1/2010     Years  since quittin.7     Smokeless tobacco: Never Used   Substance Use Topics     Alcohol use: Yes     Comment: occ     Family History   Problem Relation Age of Onset     Breast Cancer Maternal Grandmother      Hypertension Father      C.A.D. Father      Alzheimer Disease Father      Lipids Brother      Lipids Brother      Lipids Brother      Lipids Brother      Lipids Sister      Lipids Sister      Circulatory Mother         AAA     Chronic Obstructive Pulmonary Disease Mother      Breast Cancer Maternal Aunt          Current Outpatient Medications   Medication Sig Dispense Refill     ADVIL 200 MG OR TABS TAKE 1 TO 2 TABLETS EVERY 4 TO 6 HOURS AS NEEDED WITH FOOD 120 0     estradiol (ESTRACE) 1 MG tablet Take 1 tablet (1 mg) by mouth daily 90 tablet 3     GLUCOSAMINE SULFATE PO Take 500 mg by mouth daily       MULTI-VITAMIN OR TABS 1 TABLET DAILY 30 0     neomycin-polymyxin-hydrocortisone (CORTISPORIN) 3.5-81830-8 otic suspension Place 3 drops into the right ear 4 times daily for 7 days 5 mL 0     Omega-3 Fatty Acids (FISH OIL) 435 MG CAPS        SUMAtriptan (IMITREX) 100 MG tablet Take 1 tablet (100 mg) by mouth at onset of headache for migraine May repeat in 2 hours prn: max 2/day 12 tablet 4     tolterodine (DETROL LA) 2 MG 24 hr capsule Take 1 capsule (2 mg) by mouth daily (Patient not taking: Reported on 2018) 90 capsule 3     Allergies   Allergen Reactions     Codeine Other (See Comments) and Difficulty breathing     Throat closes     Morphine Other (See Comments) and Difficulty breathing     Throat closes     Sulfa Drugs Hives     Staples Other (See Comments) and Rash     Rash and infection after knee surgery       Reviewed and updated as needed this visit by clinical staff  Tobacco  Allergies  Meds  Problems  Med Hx  Surg Hx  Fam Hx  Soc Hx        Reviewed and updated as needed this visit by Provider  Tobacco  Allergies  Meds  Problems  Med Hx  Surg Hx  Fam Hx  Soc Hx           ROS:  Constitutional, HEENT, cardiovascular, pulmonary, gi and gu systems are negative, except as otherwise noted.    OBJECTIVE:     /82   Pulse 69   Temp 98.6  F (37  C)   Resp 18   Wt 91.7 kg (202 lb 3.2 oz)   LMP 04/05/2012   SpO2 96%   BMI 37.59 kg/m    Body mass index is 37.59 kg/m .  GENERAL APPEARANCE: healthy, alert and no distress  EYES: Eyes grossly normal to inspection, PERRL and conjunctivae and sclerae normal  HENT: right ear canal mildly erythematous and TM with significant amount of serous fluid without erythema, left ear canal and TM normal and nose and mouth without ulcers or lesions  NECK: no adenopathy, no asymmetry, masses, or scars and thyroid normal to palpation  RESP: lungs clear to auscultation - no rales, rhonchi or wheezes  CV: regular rates and rhythm, normal S1 S2, no S3 or S4 and no murmur, click or rub  ABDOMEN: soft, nontender, without hepatosplenomegaly or masses and bowel sounds normal  MS: extremities normal- no gross deformities noted  SKIN: no suspicious lesions or rashes  NEURO: Normal strength and tone, mentation intact and speech normal  PSYCH: mentation appears normal and affect normal/bright    Diagnostic Test Results:  none     ASSESSMENT/PLAN:     1. Infective otitis externa, right  Patient has 4-day history of increasingly worsening right ear pain without recent illness.  No fevers or chills.  On exam external ear canal on the right mildly erythematous, TM with a significant amount of serous fluid present without erythema.  Discussed with patient nature of pressure and starting an over-the-counter antihistamine along with decongestant to help dry fluid up.  Patient to return to clinic if symptoms are not improving.  - neomycin-polymyxin-hydrocortisone (CORTISPORIN) 3.5-85585-8 otic suspension; Place 3 drops into the right ear 4 times daily for 7 days  Dispense: 5 mL; Refill: 0    Patient Instructions   Start Ciprodex ear drops to right ear    Continue to  warm pack as needed    Raise head of bed at night to promote drainage     Ibuprofen or tylenol for pain or fever     Start Afrin nasal spray as directed on bottle - TAKE NO MORE THAN 3 DAYS     Start over the counter 12 or 24 hour Sudafed along with an anti-histamine such as Zyrtec or Claritin    Check in with me on Friday if significantly worse or Monday if not improving     Patient Education     External Ear Infection (Adult)    External otitis (also called  swimmer s ear ) is an infection in the ear canal. It is often caused by bacteria or fungus. It can occur a few days after water gets trapped in the ear canal (from swimming or bathing). It can also occur after cleaning too deeply in the ear canal with a cotton swab or other object. Sometimes, hair care products get into the ear canal and cause this problem.  Symptoms can include pain, fever, itching, redness, drainage, or swelling of the ear canal. Temporary hearing loss may also occur.  Home care    Do not try to clean the ear canal. This can push pus and bacteria deeper into the canal.    Use prescribed ear drops as directed. These help reduce swelling and fight the infection. If an ear wick was placed in the ear canal, apply drops right onto the end of the wick. The wick will draw the medicine into the ear canal even if it is swollen closed.    A cotton ball may be loosely placed in the outer ear to absorb any drainage.    You may use acetaminophen or ibuprofen to control pain, unless another medicine was prescribed. Note: If you have chronic liver or kidney disease or ever had a stomach ulcer or GI bleeding, talk to your healthcare provider before taking any of these medicines.    Do not allow water to get into your ear when bathing. Also, don't swim until the infection has cleared.  Prevention    Keep your ears dry. This helps lower the risk of infection. Dry your ears with a towel or hair dryer after getting wet. Also, use ear plugs when swimming.    Do  not stick any objects in the ear to remove wax.    If you feel water trapped in your ear, use ear drops right away. You can get these drops over the counter at most drugstores. They work by removing water from the ear canal.  Follow-up care  Follow up with your healthcare provider in 1 week, or as advised.  When to seek medical advice  Call your healthcare provider right away if any of these occur:    Ear pain becomes worse or doesn t improve after 3 days of treatment    Redness or swelling of the outer ear occurs or gets worse    Headache    Painful or stiff neck    Drowsiness or confusion    Fever of 100.4 F (38 C) or higher, or as directed by your healthcare provider    Seizure  Date Last Reviewed: 10/1/2017    6551-9983 [employer]. [address] [place] [address], [place] [address]. All rights reserved. This information is not intended as a substitute for professional medical care. Always follow your healthcare professional's instructions.           Patient Education     Earache, No Infection (Adult)  Earaches can happen without an infection. This occurs when air and fluid build up behind the eardrum causing a feeling of fullness and discomfort and reduced hearing. This is called otitis media with effusion (OME) or serous otitis media. It means there is fluid in the middle ear. It is not the same as acute otitis media, which is typically from infection.  OME can happen when you have a cold if congestion blocks the passage that drains the middle ear. This passage is called the eustachian tube. OME may also occur with nasal allergies or after a bacterial middle ear infection.    The pain or discomfort may come and go. You may hear clicking or popping sounds when you chew or swallow. You may feel that your balance is off. Or you may hear ringing in the ear.  It often takes from several weeks up to 3 months for the fluid to clear on its own. Oral pain relievers and ear drops help if there is pain.  Decongestants and antihistamines sometimes help. Antibiotics don't help since there is no infection. Your doctor may prescribe a nasal spray to help reduce swelling in the nose and eustachian tube. This can allow the ear to drain.  If your OME doesn't improve after 3 months, surgery may be used to drain the fluid and insert a small tube in the eardrum to allow continued drainage.  Because the middle ear fluid can become infected, it is important to watch for signs of an ear infection which may develop later. These signs include increased ear pain, fever, or drainage from the ear.  Home care  The following guidelines will help you care for yourself at home:    You may use over-the-counter medicine as directed to control pain, unless another medicine was prescribed. If you have chronic liver or kidney disease or ever had a stomach ulcer or GI bleeding, talk with your doctor before using these medicines. Aspirin should never be used in anyone under 18 years of age who is ill with a fever. It may cause severe liver damage.    You may use over-the-counter decongestants such as phenylephrine or pseudoephedrine. But they are not always helpful. Don't use nasal spray decongestants more than 3 days. Longer use can make congestion worse. Prescription nasal sprays from your doctor don't typically have those restrictions.    Antihistamines may help if you are also having allergy symptoms.    You may use medicines such as guaifenesin to thin mucus and promote drainage.  Follow-up care  Follow up with your healthcare provider or as advised if you are not feeling better after 3 days.  When to seek medical advice  Call your healthcare provider right away if any of the following occur:    Your ear pain gets worse or does not start to improve     Fever of 100.4 F (38 C) or higher, or as directed by your healthcare provider    Fluid or blood draining from the ear    Headache or sinus pain    Stiff neck    Unusual drowsiness or  confusion  Date Last Reviewed: 10/1/2016    3567-0301 [employer]. [address] [facility], [place] [address]. All rights reserved. This information is not intended as a substitute for professional medical care. Always follow your healthcare professional's instructions.               STEFANIE Carrizales [facility]

## 2018-12-12 NOTE — PATIENT INSTRUCTIONS
Start Ciprodex ear drops to right ear    Continue to warm pack as needed    Raise head of bed at night to promote drainage     Ibuprofen or tylenol for pain or fever     Start Afrin nasal spray as directed on bottle - TAKE NO MORE THAN 3 DAYS     Start over the counter 12 or 24 hour Sudafed along with an anti-histamine such as Zyrtec or Claritin    Check in with me on Friday if significantly worse or Monday if not improving     Patient Education     External Ear Infection (Adult)    External otitis (also called  swimmer s ear ) is an infection in the ear canal. It is often caused by bacteria or fungus. It can occur a few days after water gets trapped in the ear canal (from swimming or bathing). It can also occur after cleaning too deeply in the ear canal with a cotton swab or other object. Sometimes, hair care products get into the ear canal and cause this problem.  Symptoms can include pain, fever, itching, redness, drainage, or swelling of the ear canal. Temporary hearing loss may also occur.  Home care    Do not try to clean the ear canal. This can push pus and bacteria deeper into the canal.    Use prescribed ear drops as directed. These help reduce swelling and fight the infection. If an ear wick was placed in the ear canal, apply drops right onto the end of the wick. The wick will draw the medicine into the ear canal even if it is swollen closed.    A cotton ball may be loosely placed in the outer ear to absorb any drainage.    You may use acetaminophen or ibuprofen to control pain, unless another medicine was prescribed. Note: If you have chronic liver or kidney disease or ever had a stomach ulcer or GI bleeding, talk to your healthcare provider before taking any of these medicines.    Do not allow water to get into your ear when bathing. Also, don't swim until the infection has cleared.  Prevention    Keep your ears dry. This helps lower the risk of infection. Dry your ears with a towel or hair dryer after  getting wet. Also, use ear plugs when swimming.    Do not stick any objects in the ear to remove wax.    If you feel water trapped in your ear, use ear drops right away. You can get these drops over the counter at most drugstores. They work by removing water from the ear canal.  Follow-up care  Follow up with your healthcare provider in 1 week, or as advised.  When to seek medical advice  Call your healthcare provider right away if any of these occur:    Ear pain becomes worse or doesn t improve after 3 days of treatment    Redness or swelling of the outer ear occurs or gets worse    Headache    Painful or stiff neck    Drowsiness or confusion    Fever of 100.4 F (38 C) or higher, or as directed by your healthcare provider    Seizure  Date Last Reviewed: 10/1/2017    4719-1011 [employer]. [address] [place] [address]. All rights reserved. This information is not intended as a substitute for professional medical care. Always follow your healthcare professional's instructions.           Patient Education     Earache, No Infection (Adult)  Earaches can happen without an infection. This occurs when air and fluid build up behind the eardrum causing a feeling of fullness and discomfort and reduced hearing. This is called otitis media with effusion (OME) or serous otitis media. It means there is fluid in the middle ear. It is not the same as acute otitis media, which is typically from infection.  OME can happen when you have a cold if congestion blocks the passage that drains the middle ear. This passage is called the eustachian tube. OME may also occur with nasal allergies or after a bacterial middle ear infection.    The pain or discomfort may come and go. You may hear clicking or popping sounds when you chew or swallow. You may feel that your balance is off. Or you may hear ringing in the ear.  It often takes from several weeks up to 3 months for the fluid to clear on its own. Oral pain  relievers and ear drops help if there is pain. Decongestants and antihistamines sometimes help. Antibiotics don't help since there is no infection. Your doctor may prescribe a nasal spray to help reduce swelling in the nose and eustachian tube. This can allow the ear to drain.  If your OME doesn't improve after 3 months, surgery may be used to drain the fluid and insert a small tube in the eardrum to allow continued drainage.  Because the middle ear fluid can become infected, it is important to watch for signs of an ear infection which may develop later. These signs include increased ear pain, fever, or drainage from the ear.  Home care  The following guidelines will help you care for yourself at home:    You may use over-the-counter medicine as directed to control pain, unless another medicine was prescribed. If you have chronic liver or kidney disease or ever had a stomach ulcer or GI bleeding, talk with your doctor before using these medicines. Aspirin should never be used in anyone under 18 years of age who is ill with a fever. It may cause severe liver damage.    You may use over-the-counter decongestants such as phenylephrine or pseudoephedrine. But they are not always helpful. Don't use nasal spray decongestants more than 3 days. Longer use can make congestion worse. Prescription nasal sprays from your doctor don't typically have those restrictions.    Antihistamines may help if you are also having allergy symptoms.    You may use medicines such as guaifenesin to thin mucus and promote drainage.  Follow-up care  Follow up with your healthcare provider or as advised if you are not feeling better after 3 days.  When to seek medical advice  Call your healthcare provider right away if any of the following occur:    Your ear pain gets worse or does not start to improve     Fever of 100.4 F (38 C) or higher, or as directed by your healthcare provider    Fluid or blood draining from the ear    Headache or sinus  pain    Stiff neck    Unusual drowsiness or confusion  Date Last Reviewed: 10/1/2016    5480-5156 [employer]. [address] [facility], [place] [address]. All rights reserved. This information is not intended as a substitute for professional medical care. Always follow your healthcare professional's instructions.

## 2018-12-12 NOTE — NURSING NOTE
"Chief Complaint   Patient presents with     Ear Problem       Initial LMP 04/05/2012  Estimated body mass index is 37.18 kg/m  as calculated from the following:    Height as of 11/5/18: 1.562 m (5' 1.5\").    Weight as of 11/5/18: 90.7 kg (200 lb).    Patient presents to the clinic using No DME    Health Maintenance that is potentially due pending provider review:  NONE    n/a    Is there anyone who you would like to be able to receive your results? not asked  If yes have patient fill out FABIAN    "

## 2019-01-29 ENCOUNTER — OFFICE VISIT (OUTPATIENT)
Dept: FAMILY MEDICINE | Facility: CLINIC | Age: 54
End: 2019-01-29
Payer: COMMERCIAL

## 2019-01-29 VITALS
WEIGHT: 198.13 LBS | TEMPERATURE: 98.3 F | OXYGEN SATURATION: 98 % | HEIGHT: 62 IN | DIASTOLIC BLOOD PRESSURE: 70 MMHG | BODY MASS INDEX: 36.46 KG/M2 | RESPIRATION RATE: 18 BRPM | HEART RATE: 79 BPM | SYSTOLIC BLOOD PRESSURE: 133 MMHG

## 2019-01-29 DIAGNOSIS — H65.04 RECURRENT ACUTE SEROUS OTITIS MEDIA OF RIGHT EAR: Primary | ICD-10-CM

## 2019-01-29 PROCEDURE — 99213 OFFICE O/P EST LOW 20 MIN: CPT | Performed by: FAMILY MEDICINE

## 2019-01-29 RX ORDER — HYDROCODONE BITARTRATE AND ACETAMINOPHEN 5; 325 MG/1; MG/1
1 TABLET ORAL EVERY 6 HOURS PRN
Qty: 18 TABLET | Refills: 0 | Status: SHIPPED | OUTPATIENT
Start: 2019-01-29 | End: 2019-06-07

## 2019-01-29 ASSESSMENT — MIFFLIN-ST. JEOR: SCORE: 1468.32

## 2019-01-29 NOTE — PATIENT INSTRUCTIONS
Patient Education     Otitis Media (Middle-Ear Infection) in Adults  Otitis media is another name for a middle-ear infection. It means an infection behind your eardrum. This kind of ear infection can happen after any condition that keeps fluid from draining from the middle ear. These conditions include allergies, a cold, a sore throat, or a respiratory infection.  Middle-ear infections are common in children, but they can also happen in adults. An ear infection in an adult may mean a more serious problem than in a child. So you may need additional tests. If you have an ear infection, you should see your health care provider for treatment.  What are the types of middle-ear infections?  Infections can affect the middle ear in several ways. They are:    Acute otitis media. This middle-ear infection occurs suddenly. It causes swelling and redness. Fluid and mucus become trapped inside the ear. You can have a fever and ear pain.    Otitis media with effusion. Fluid (effusion) and mucus build up in the middle ear after the infection goes away. You may feel like your middle ear is full. This can continue for months and may affect your hearing.    Chronic otitis media with effusion. Fluid (effusion) remains in the middle ear for a long time. Or it builds up again and again, even though there is no infection. This type of middle-ear infection may be hard to treat. It may also affect your hearing.  Who is more likely to get a middle-ear infection?  You are more likely to get an ear infection if you:    Smoke or are around someone who smokes    Have seasonal or year-round allergy symptoms    Have a cold or other upper respiratory infection  What causes a middle-ear infection?  The middle ear connects to the throat by a canal called the eustachian tube. This tube helps even out the pressure between the outer ear and the inner ear. A cold or allergy can irritate the tube or cause the area around it to swell. This can keep  fluid from draining from the middle ear. The fluid builds up behind the eardrum. Bacteria and viruses can grow in this fluid. The bacteria and viruses cause the middle-ear infection.  What are the symptoms of a middle-ear infection?  Common symptoms of a middle-ear infection in adults are:    Pain in 1 or both ears    Drainage from the ear    Muffled hearing    Sore throat   You may also have a fever. Rarely, your balance can be affected.  These symptoms may be the same as for other conditions. It s important to talk with your health care provider if you think you have a middle-ear infection. If you have a high fever, severe pain behind your ear, or paralysis in your face, see your provider as soon as you can.  How is a middle-ear infection diagnosed?  Your health care provider will take a medical history and do a physical exam. He or she will look at the outer ear and eardrum with an otoscope. The otoscope is a lighted tool that lets your provider see inside the ear. A pneumatic otoscope blows a puff of air into the ear to check how well your eardrum moves. If you eardrum doesn t move well, it may mean you have fluid behind it.  Your provider may also do a test called tympanometry. This test tells how well the middle ear is working. It can find any changes in pressure in the middle ear. Your provider may test your hearing with a tuning fork.  How is a middle-ear infection treated?  A middle-ear infection may be treated with:    Antibiotics, taken by mouth or as ear drops    Medication for pain    Decongestants, antihistamines, or nasal steroids  Your health care provider may also have you try autoinsufflation. This helps adjust the air pressure in your ear. For this, you pinch your nose and gently exhale. This forces air back through the eustachian tube.  The exact treatment for your ear infection will depend on the type of infection you have. In general, if your symptoms don t get better in 48 to 72 hours, contact  your health care provider.  Middle-ear infections can cause long-term problems if not treated. They can lead to:    Infection in other parts of the head    Permanent hearing loss    Paralysis of a nerve in your face  If you have a middle-ear infection that doesn t get better, you may need to see an ear, nose, and throat specialist (otolaryngologist). You may need a CT scan or MRI to check for head and neck cancer.  Ear tubes  Sometimes fluid stays in the middle ear even after you take antibiotics and the infection goes away. In this case, your health care provider may suggest that a small tube be placed in your ear. The tube is put at the opening of the eardrum. The tube keeps fluid from building up and relieves pressure in the middle ear. It can also help you hear better. This surgery is called myringotomy. It is not often done in adults.  The tubes usually fall out on their own after 6 months to a year.    3165-9411 [employer]. [address] [place] [address], [place] [address]. All rights reserved. This information is not intended as a substitute for professional medical care. Always follow your healthcare professional's instructions.

## 2019-01-29 NOTE — PROGRESS NOTES
SUBJECTIVE:   Shae Pierre is a 53 year old female who presents to clinic today for the following health issues:      EAR PROBLEM      Duration: since before being seen on 12/12/18.  Probably 12/1/18    Description (location/character/radiation): started in right ear and has since moved into left ear.      Intensity:  moderate, Currently 8/10    Accompanying signs and symptoms: Experiencing dizziness, headaches and feeling pain in right jaw area and entire ear area.      History (similar episodes/previous evaluation): None    Precipitating or alleviating factors: Almost completed prescription ear drops and using hot/warm packs.    Therapies tried and outcome: see above.       History as above, here for left ear pain ongoing for over six weeks was [facility] and prescribed some ear drops a few weeks before [place]. Says she has been using it since faithfully.She reports severe pain and she is unable to lie on that side. No drainage from the ear, giving her some mild headaches .  Problem list and histories reviewed & adjusted, as indicated.  Additional history: as documented    Patient Active Problem List   Diagnosis     Dermatophytosis of nail     Obesity     CARDIOVASCULAR SCREENING; LDL GOAL LESS THAN 160     Migraine headache     Breast cyst     Urge incontinence of urine     Benign essential hypertension     Past Surgical History:   Procedure Laterality Date     COLONOSCOPY N/A 4/16/2018    Procedure: COLONOSCOPY;  Colonoscopy;  Surgeon: Chuck Donovan MD;  Location: [facility]     EXCISE MASS TRUNK  7/13/2012    Procedure: EXCISE MASS TRUNK;  Excisional Removal Chest Wall Cysts X2;  Surgeon: Dennys Henriquez MD;  Location: [facility]     HYSTERECTOMY, PAP NO LONGER INDICATED       LAPAROSCOPIC ASSISTED HYSTERECTOMY VAGINAL, BILATERAL SALPINGO-OOPHORECTOMY, COMBINED  4/5/2012    LAV-BSO     SURGICAL HISTORY OF -       LEEP     SURGICAL HISTORY OF -       Cryotherapy      SURGICAL HISTORY OF -        Colpo     SURGICAL HISTORY OF -       left knee/patella surgery       Social History     Tobacco Use     Smoking status: Former Smoker     Types: Cigarettes     Last attempt to quit: 2010     Years since quittin.8     Smokeless tobacco: Never Used   Substance Use Topics     Alcohol use: Yes     Comment: occ     Family History   Problem Relation Age of Onset     Breast Cancer Maternal Grandmother      Hypertension Father      C.A.D. Father      Alzheimer Disease Father      Lipids Brother      Lipids Brother      Lipids Brother      Lipids Brother      Lipids Sister      Lipids Sister      Circulatory Mother         AAA     Chronic Obstructive Pulmonary Disease Mother      Breast Cancer Maternal Aunt          Current Outpatient Medications   Medication Sig Dispense Refill     ADVIL 200 MG OR TABS TAKE 1 TO 2 TABLETS EVERY 4 TO 6 HOURS AS NEEDED WITH FOOD 120 0     amoxicillin-clavulanate (AUGMENTIN) 875-125 MG tablet Take 1 tablet by mouth 2 times daily 20 tablet 0     estradiol (ESTRACE) 1 MG tablet Take 1 tablet (1 mg) by mouth daily 90 tablet 3     GLUCOSAMINE SULFATE PO Take 500 mg by mouth daily       HYDROcodone-acetaminophen (NORCO) 5-325 MG tablet Take 1 tablet by mouth every 6 hours as needed for pain 18 tablet 0     MULTI-VITAMIN OR TABS 1 TABLET DAILY 30 0     Omega-3 Fatty Acids (FISH OIL) 435 MG CAPS        SUMAtriptan (IMITREX) 100 MG tablet Take 1 tablet (100 mg) by mouth at onset of headache for migraine May repeat in 2 hours prn: max 2/day 12 tablet 4     tolterodine (DETROL LA) 2 MG 24 hr capsule Take 1 capsule (2 mg) by mouth daily (Patient not taking: Reported on 2018) 90 capsule 3     Allergies   Allergen Reactions     Codeine Other (See Comments) and Difficulty breathing     Throat closes     Morphine Other (See Comments) and Difficulty breathing     Throat closes     Sulfa Drugs Hives     Staples Other (See Comments) and Rash     Rash and infection after knee surgery     BP  "Readings from Last 3 Encounters:   01/29/19 133/70   12/12/18 136/82   11/05/18 125/82    Wt Readings from Last 3 Encounters:   01/29/19 89.9 kg (198 lb 2 oz)   12/12/18 91.7 kg (202 lb 3.2 oz)   11/05/18 90.7 kg (200 lb)                  Labs reviewed in EPIC    Reviewed and updated as needed this visit by clinical staff  Tobacco  Allergies  Meds  Med Hx  Surg Hx  Fam Hx  Soc Hx      Reviewed and updated as needed this visit by Provider         ROS:  Constitutional, HEENT, cardiovascular, pulmonary, gi and gu systems are negative, except as otherwise noted.    OBJECTIVE:     /70   Pulse 79   Temp 98.3  F (36.8  C) (Tympanic)   Resp 18   Ht 1.577 m (5' 2.09\")   Wt 89.9 kg (198 lb 2 oz)   LMP 04/05/2012   SpO2 98%   BMI 36.14 kg/m    Body mass index is 36.14 kg/m .  GENERAL: healthy, alert and no distress  HENT: normal cephalic/atraumatic, right ear: red and boggy canal, left ear: normal: no effusions, no erythema, normal landmarks, nose and mouth without ulcers or lesions, oropharynx clear and oral mucous membranes moist  RESP: lungs clear to auscultation - no rales, rhonchi or wheezes  MS: no gross musculoskeletal defects noted, no edema    Diagnostic Test Results:  none     ASSESSMENT/PLAN:           ICD-10-CM    1. Recurrent acute serous otitis media of right ear H65.04 amoxicillin-clavulanate (AUGMENTIN) 875-125 MG tablet     HYDROcodone-acetaminophen (NORCO) 5-325 MG tablet       FUTURE APPOINTMENTS:       - Follow-up visit in one week or as needed .  Patient Instructions     Patient Education     Otitis Media (Middle-Ear Infection) in Adults  Otitis media is another name for a middle-ear infection. It means an infection behind your eardrum. This kind of ear infection can happen after any condition that keeps fluid from draining from the middle ear. These conditions include allergies, a cold, a sore throat, or a respiratory infection.  Middle-ear infections are common in children, but they " can also happen in adults. An ear infection in an adult may mean a more serious problem than in a child. So you may need additional tests. If you have an ear infection, you should see your health care provider for treatment.  What are the types of middle-ear infections?  Infections can affect the middle ear in several ways. They are:    Acute otitis media. This middle-ear infection occurs suddenly. It causes swelling and redness. Fluid and mucus become trapped inside the ear. You can have a fever and ear pain.    Otitis media with effusion. Fluid (effusion) and mucus build up in the middle ear after the infection goes away. You may feel like your middle ear is full. This can continue for months and may affect your hearing.    Chronic otitis media with effusion. Fluid (effusion) remains in the middle ear for a long time. Or it builds up again and again, even though there is no infection. This type of middle-ear infection may be hard to treat. It may also affect your hearing.  Who is more likely to get a middle-ear infection?  You are more likely to get an ear infection if you:    Smoke or are around someone who smokes    Have seasonal or year-round allergy symptoms    Have a cold or other upper respiratory infection  What causes a middle-ear infection?  The middle ear connects to the throat by a canal called the eustachian tube. This tube helps even out the pressure between the outer ear and the inner ear. A cold or allergy can irritate the tube or cause the area around it to swell. This can keep fluid from draining from the middle ear. The fluid builds up behind the eardrum. Bacteria and viruses can grow in this fluid. The bacteria and viruses cause the middle-ear infection.  What are the symptoms of a middle-ear infection?  Common symptoms of a middle-ear infection in adults are:    Pain in 1 or both ears    Drainage from the ear    Muffled hearing    Sore throat   You may also have a fever. Rarely, your balance  can be affected.  These symptoms may be the same as for other conditions. It s important to talk with your health care provider if you think you have a middle-ear infection. If you have a high fever, severe pain behind your ear, or paralysis in your face, see your provider as soon as you can.  How is a middle-ear infection diagnosed?  Your health care provider will take a medical history and do a physical exam. He or she will look at the outer ear and eardrum with an otoscope. The otoscope is a lighted tool that lets your provider see inside the ear. A pneumatic otoscope blows a puff of air into the ear to check how well your eardrum moves. If you eardrum doesn t move well, it may mean you have fluid behind it.  Your provider may also do a test called tympanometry. This test tells how well the middle ear is working. It can find any changes in pressure in the middle ear. Your provider may test your hearing with a tuning fork.  How is a middle-ear infection treated?  A middle-ear infection may be treated with:    Antibiotics, taken by mouth or as ear drops    Medication for pain    Decongestants, antihistamines, or nasal steroids  Your health care provider may also have you try autoinsufflation. This helps adjust the air pressure in your ear. For this, you pinch your nose and gently exhale. This forces air back through the eustachian tube.  The exact treatment for your ear infection will depend on the type of infection you have. In general, if your symptoms don t get better in 48 to 72 hours, contact your health care provider.  Middle-ear infections can cause long-term problems if not treated. They can lead to:    Infection in other parts of the head    Permanent hearing loss    Paralysis of a nerve in your face  If you have a middle-ear infection that doesn t get better, you may need to see an ear, nose, and throat specialist (otolaryngologist). You may need a CT scan or MRI to check for head and neck cancer.  Ear  tubes  Sometimes fluid stays in the middle ear even after you take antibiotics and the infection goes away. In this case, your health care provider may suggest that a small tube be placed in your ear. The tube is put at the opening of the eardrum. The tube keeps fluid from building up and relieves pressure in the middle ear. It can also help you hear better. This surgery is called myringotomy. It is not often done in adults.  The tubes usually fall out on their own after 6 months to a year.    5304-4326 [employer]. [address] [place] [address]. All rights reserved. This information is not intended as a substitute for professional medical care. Always follow your healthcare professional's instructions.               Robb Painting MD  [facility]

## 2019-02-07 ENCOUNTER — OFFICE VISIT (OUTPATIENT)
Dept: FAMILY MEDICINE | Facility: CLINIC | Age: 54
End: 2019-02-07
Payer: COMMERCIAL

## 2019-02-07 ENCOUNTER — TELEPHONE (OUTPATIENT)
Dept: OTOLARYNGOLOGY | Facility: CLINIC | Age: 54
End: 2019-02-07

## 2019-02-07 VITALS
WEIGHT: 201 LBS | SYSTOLIC BLOOD PRESSURE: 108 MMHG | OXYGEN SATURATION: 97 % | TEMPERATURE: 98.5 F | BODY MASS INDEX: 36.99 KG/M2 | RESPIRATION RATE: 14 BRPM | DIASTOLIC BLOOD PRESSURE: 60 MMHG | HEART RATE: 71 BPM | HEIGHT: 62 IN

## 2019-02-07 DIAGNOSIS — H65.23 BILATERAL CHRONIC SEROUS OTITIS MEDIA: Primary | ICD-10-CM

## 2019-02-07 PROCEDURE — 99213 OFFICE O/P EST LOW 20 MIN: CPT | Performed by: FAMILY MEDICINE

## 2019-02-07 ASSESSMENT — MIFFLIN-ST. JEOR: SCORE: 1479.98

## 2019-02-07 NOTE — TELEPHONE ENCOUNTER
Reason for Call:  Other call back    Detailed comments: Dr. Painting's nurse called wanting to add a patient on for Friday in [place] for Chronic ear infections. I have scheduled patient with Dr. Fontana in [place] on [address]. Please call and advise Thank you     Phone Number Patient can be reached at: Home number on file [phone] (home)    Best Time: any    Can we leave a detailed message on this number? YES    Call taken on 2/7/2019 at 7:43 AM by Aparna Huerta

## 2019-02-07 NOTE — PATIENT INSTRUCTIONS
Thank you for choosing [facility].  You may be receiving a survey in the mail from Irina Mckay regarding your visit today.  Please take a few minutes to complete and return the survey to let us know how we are doing.      If you have questions or concerns, please contact us via [employer] or you can contact your care team at [phone].    Our Clinic hours are:  Monday 6:40 am  to 7:00 pm  Tuesday -Friday 6:40 am to 5:00 pm    The [place] outpatient lab hours are:  Monday - Friday 6:10 am to 4:45 pm  Saturdays 7:00 am to 11:00 am  Appointments are required, call [phone]    If you have clinical questions after hours or would like to schedule an appointment,  call the clinic at [phone].

## 2019-02-07 NOTE — PROGRESS NOTES
SUBJECTIVE:   Shae Pierre is a 53 year old female who presents to clinic today for the following health issues:    53 yr old female here for continued ear pain . She has been seen twice in the last two months. She was at first on ears drops for about a month, she was given oral antibiotics at her last visit. Despite this she is still having severe ear pain. Patient reports that her ears feel full. She has been having some mild headaches.       ENT Symptoms             Symptoms: cc Present Absent Comment   Fever/Chills   x    Fatigue   x    Muscle Aches   x    Eye Irritation   x    Sneezing   x    Nasal Mark Anthony/Drg   x    Sinus Pressure/Pain   x    Loss of smell   x    Dental pain   x    Sore Throat   x    Swollen Glands   x    Ear Pain/Fullness  x  Pain in R ear moving to L ear    Cough   x    Wheeze   x    Chest Pain   x    Shortness of breath   x    Rash   x    Other  x Headache  From ear pain      Symptom duration:  12/2018   Symptom severity:  severe   Treatments tried:  Patient has been seen and treated    Contacts:  none              Problem list and histories reviewed & adjusted, as indicated.  Additional history: as documented    Patient Active Problem List   Diagnosis     Dermatophytosis of nail     Obesity     CARDIOVASCULAR SCREENING; LDL GOAL LESS THAN 160     Migraine headache     Breast cyst     Urge incontinence of urine     Benign essential hypertension     Past Surgical History:   Procedure Laterality Date     COLONOSCOPY N/A 4/16/2018    Procedure: COLONOSCOPY;  Colonoscopy;  Surgeon: Chuck Donovan MD;  Location: [facility]     EXCISE MASS TRUNK  7/13/2012    Procedure: EXCISE MASS TRUNK;  Excisional Removal Chest Wall Cysts X2;  Surgeon: Dennys Henriquez MD;  Location: [facility]     HYSTERECTOMY, PAP NO LONGER INDICATED       LAPAROSCOPIC ASSISTED HYSTERECTOMY VAGINAL, BILATERAL SALPINGO-OOPHORECTOMY, COMBINED  4/5/2012    LAV-BSO     SURGICAL HISTORY OF -       LEEP     SURGICAL HISTORY OF -        Cryotherapy      SURGICAL HISTORY OF -       Colpo     SURGICAL HISTORY OF -       left knee/patella surgery       Social History     Tobacco Use     Smoking status: Former Smoker     Types: Cigarettes     Last attempt to quit: 2010     Years since quittin.8     Smokeless tobacco: Never Used   Substance Use Topics     Alcohol use: Yes     Comment: occ     Family History   Problem Relation Age of Onset     Breast Cancer Maternal Grandmother      Hypertension Father      C.A.D. Father      Alzheimer Disease Father      Lipids Brother      Lipids Brother      Lipids Brother      Lipids Brother      Lipids Sister      Lipids Sister      Circulatory Mother         AAA     Chronic Obstructive Pulmonary Disease Mother      Breast Cancer Maternal Aunt          Current Outpatient Medications   Medication Sig Dispense Refill     amoxicillin-clavulanate (AUGMENTIN) 875-125 MG tablet Take 1 tablet by mouth 2 times daily 20 tablet 0     estradiol (ESTRACE) 1 MG tablet Take 1 tablet (1 mg) by mouth daily 90 tablet 3     GLUCOSAMINE SULFATE PO Take 500 mg by mouth daily       MULTI-VITAMIN OR TABS 1 TABLET DAILY 30 0     Omega-3 Fatty Acids (FISH OIL) 435 MG CAPS        SUMAtriptan (IMITREX) 100 MG tablet Take 1 tablet (100 mg) by mouth at onset of headache for migraine May repeat in 2 hours prn: max 2/day 12 tablet 4     ADVIL 200 MG OR TABS TAKE 1 TO 2 TABLETS EVERY 4 TO 6 HOURS AS NEEDED WITH FOOD 120 0     Allergies   Allergen Reactions     Codeine Other (See Comments) and Difficulty breathing     Throat closes     Morphine Other (See Comments) and Difficulty breathing     Throat closes     Sulfa Drugs Hives     Staples Other (See Comments) and Rash     Rash and infection after knee surgery     BP Readings from Last 3 Encounters:   19 108/60   19 133/70   18 136/82    Wt Readings from Last 3 Encounters:   19 91.2 kg (201 lb)   19 89.9 kg (198 lb 2 oz)   18 91.7 kg (202 lb 3.2 oz)  "                 Labs reviewed in EPIC    Reviewed and updated as needed this visit by clinical staff  Tobacco  Allergies  Meds  Med Hx  Surg Hx  Fam Hx  Soc Hx      Reviewed and updated as needed this visit by Provider         ROS:  Constitutional, HEENT, cardiovascular, pulmonary, gi and gu systems are negative, except as otherwise noted.    OBJECTIVE:     /60   Pulse 71   Temp 98.5  F (36.9  C)   Resp 14   Ht 1.575 m (5' 2\")   Wt 91.2 kg (201 lb)   LMP 04/05/2012   SpO2 97%   BMI 36.76 kg/m    Body mass index is 36.76 kg/m .  GENERAL: healthy, alert and no distress  EYES: Eyes grossly normal to inspection, PERRL and conjunctivae and sclerae normal  HENT: normal cephalic/atraumatic, right ear: erythematous and mucopurulent effusion, left ear: clear effusion and red and boggy canal, nose and mouth without ulcers or lesions, oropharynx clear and oral mucous membranes moist  NECK: no adenopathy, no asymmetry, masses, or scars and thyroid normal to palpation  RESP: lungs clear to auscultation - no rales, rhonchi or wheezes  CV: regular rate and rhythm, normal S1 S2, no S3 or S4, no murmur, click or rub, no peripheral edema and peripheral pulses strong  MS: no gross musculoskeletal defects noted, no edema    Diagnostic Test Results:  none     ASSESSMENT/PLAN:         1. Bilateral chronic serous otitis media  Patient is referred to ENT , we tried to get her an appointment today with no avail.       FUTURE APPOINTMENTS:       - Follow-up visit in one month.    Robb Painting MD  [facility]  "

## 2019-02-08 ENCOUNTER — TELEPHONE (OUTPATIENT)
Dept: OTOLARYNGOLOGY | Facility: CLINIC | Age: 54
End: 2019-02-08

## 2019-02-08 ENCOUNTER — OFFICE VISIT (OUTPATIENT)
Dept: OTOLARYNGOLOGY | Facility: CLINIC | Age: 54
End: 2019-02-08
Payer: COMMERCIAL

## 2019-02-08 VITALS
SYSTOLIC BLOOD PRESSURE: 122 MMHG | WEIGHT: 201 LBS | HEIGHT: 62 IN | BODY MASS INDEX: 36.99 KG/M2 | DIASTOLIC BLOOD PRESSURE: 88 MMHG

## 2019-02-08 DIAGNOSIS — H60.391 INFECTIVE OTITIS EXTERNA, RIGHT: Primary | ICD-10-CM

## 2019-02-08 PROCEDURE — 99243 OFF/OP CNSLTJ NEW/EST LOW 30: CPT | Performed by: OTOLARYNGOLOGY

## 2019-02-08 RX ORDER — PREDNISONE 20 MG/1
TABLET ORAL
Qty: 20 TABLET | Refills: 0 | Status: SHIPPED | OUTPATIENT
Start: 2019-02-08 | End: 2019-06-07

## 2019-02-08 RX ORDER — CIPROFLOXACIN AND DEXAMETHASONE 3; 1 MG/ML; MG/ML
4 SUSPENSION/ DROPS AURICULAR (OTIC) 2 TIMES DAILY
Qty: 7.5 ML | Refills: 0 | Status: SHIPPED | OUTPATIENT
Start: 2019-02-08 | End: 2019-06-07

## 2019-02-08 ASSESSMENT — MIFFLIN-ST. JEOR: SCORE: 1479.98

## 2019-02-08 NOTE — PATIENT INSTRUCTIONS
General Scheduling Information  To schedule your CT/MRI scan, please contact [facility] at [phone]   [address] [place] [address]    To schedule your Surgery, please contact our Specialty Schedulers at [phone]    ENT Clinic Locations Clinic Hours Telephone Number     Angel [facility]  [address] [place] [address]   Tuesday:       8:00am -- 4:00pm    Wednesday:  8:00am - 4:00pm   To schedule an appointment with   Dr. Fontana,   please contact our   Specialty Scheduling Department at:     [phone]       Angel Melendrez  [address] [place] [address]   Friday:          8:00am - 4:00pm         Urgent Care Locations Clinic Hours Telephone Numbers     Angel Fowler  [address] [place] [address]     Monday-Friday:     11:00pm - 9:00pm    Saturday-Sunday:  9:00am - 5:00pm   [phone]     Angel Melendrez  [address] [place] [address]     Monday-Friday:      5:00pm - 9:00pm     Saturday-Sunday:  9:00am - 5:00pm   [phone]

## 2019-02-08 NOTE — TELEPHONE ENCOUNTER
Reason for call:  Possible change in medication  Patient called regarding (reason for call): prescription for CIPRODEX)   Additional comments: Pharmacist would like to change to cortisporin eye drops instead as it's less expensive. Call to advise.    Phone number to reach patient:  Other phone number:  [phone]*    Best Time:  Before 7 pm    Can we leave a detailed message on this number?  YES

## 2019-02-08 NOTE — PROGRESS NOTES
I am seeing this patient in consultation for ear infection at the request of the provider Dr. Robb Painting.    Chief Complaint - ear pain    History of Present Illness - Shae Pierre is a 53 year old female who presents with the new onset of ear pain. The patient describes this as starting on the right. It is very painful. Started 2 months ago. It came on suddenly, no upper respiratory infection and no prior ear infections. Had fluid in ear feeling on the right. Tried drops (cortisporin) and amoxicillin. Pain is still present, but no hearing loss. She felt the drops helped some. It is worse sleeping supine, sitting up helps. No pain to chew. Her jaw hurts and into head. She has migraines. Started after a massage. The patient is not sure whether or not they grind their teeth, and no recent dental work. No teeth pain.        Past Medical History -   Patient Active Problem List   Diagnosis     Dermatophytosis of nail     Obesity     CARDIOVASCULAR SCREENING; LDL GOAL LESS THAN 160     Migraine headache     Breast cyst     Urge incontinence of urine     Benign essential hypertension       Current Medications -   Current Outpatient Medications:      ADVIL 200 MG OR TABS, TAKE 1 TO 2 TABLETS EVERY 4 TO 6 HOURS AS NEEDED WITH FOOD, Disp: 120, Rfl: 0     estradiol (ESTRACE) 1 MG tablet, Take 1 tablet (1 mg) by mouth daily, Disp: 90 tablet, Rfl: 3     GLUCOSAMINE SULFATE PO, Take 500 mg by mouth daily, Disp: , Rfl:      MULTI-VITAMIN OR TABS, 1 TABLET DAILY, Disp: 30, Rfl: 0     Omega-3 Fatty Acids (FISH OIL) 435 MG CAPS, , Disp: , Rfl:      SUMAtriptan (IMITREX) 100 MG tablet, Take 1 tablet (100 mg) by mouth at onset of headache for migraine May repeat in 2 hours prn: max 2/day, Disp: 12 tablet, Rfl: 4    Allergies -   Allergies   Allergen Reactions     Codeine Other (See Comments) and Difficulty breathing     Throat closes     Morphine Other (See Comments) and Difficulty breathing     Throat closes     Sulfa Drugs  "Hives     Staples Other (See Comments) and Rash     Rash and infection after knee surgery       Social History -   Social History     Socioeconomic History     Marital status:      Spouse name: None     Number of children: None     Years of education: None     Highest education level: None   Social Needs     Financial resource strain: None     Food insecurity - worry: None     Food insecurity - inability: None     Transportation needs - medical: None     Transportation needs - non-medical: None   Occupational History     Occupation:      Employer: [employer]     Comment: [employer]   Tobacco Use     Smoking status: Former Smoker     Types: Cigarettes     Last attempt to quit: 2010     Years since quittin.8     Smokeless tobacco: Never Used   Substance and Sexual Activity     Alcohol use: Yes     Comment: occ     Drug use: No     Sexual activity: Not Currently     Partners: Male   Other Topics Concern     Parent/sibling w/ CABG, MI or angioplasty before 65F 55M? No   Social History Narrative     None       Family History -   Family History   Problem Relation Age of Onset     Breast Cancer Maternal Grandmother      Hypertension Father      C.A.D. Father      Alzheimer Disease Father      Lipids Brother      Lipids Brother      Lipids Brother      Lipids Brother      Lipids Sister      Lipids Sister      Circulatory Mother         AAA     Chronic Obstructive Pulmonary Disease Mother      Breast Cancer Maternal Aunt        Review of Systems - As per HPI and PMHx, otherwise 7 system review of the head and neck negative.    Physical Exam  /88   Ht 1.575 m (5' 2\")   Wt 91.2 kg (201 lb)   LMP 2012   BMI 36.76 kg/m    General - The patient is in no distress.  Alert and oriented to person and place, answers questions and cooperates with examination appropriately.   Neurologic - CN II-XII are grossly intact, no focal neurologic deficits.   Voice and " Breathing - The patient was breathing comfortably without the use of accessory muscles. There was no wheezing, stridor, or stertor.  The patients voice was clear and strong.  Eyes - Extraocular movements intact. Sclera were not icteric or injected, conjunctiva were pink and moist.  Ears -the right pinna was examined and appears normal.  However manipulation of the pinna or lateral ear canal exhibits significant pain.  Placing the speculum in the right ear canal causes pain.  She seems to have some edema albeit mild at the right lateral ear canal.  I do not see any lesions of the ear canal.  She has some residual white drop debris.  She may have a small keratin crust on the inferior aspect of the TM.  I do not see any effusions.  The left ear canal and pinna were normal.  No otitis externa.  The left tympanic membrane is normal left middle ear effusions normal.    Mouth - Dentition in fair condition, no masses, ulcerations, or erosions noted on examination of mucosa. The tongue is mobile and midline, and the uvula is midline on elevation.  Palpation of the TMJs were mildly tender, but not as tender as touching the right lateral ear canal.  Throat - The walls of the oropharynx were smooth, symmetric, and had no lesions or ulcerations. The uvula was midline on elevation.  Tonsils 1+ and quite no masses.  Neck - Palpation of the occipital, submental, submandibular, internal jugular chain, and supraclavicular nodes did not demonstrate any abnormal lymph nodes or masses. Palpation of the thyroid was soft and smooth, with no nodules or goiter appreciated.  The trachea was mobile and midline.  Neurological - Cranial nerves 2 through 12 were grossly intact. No focal neurologic deficits.      A/P - Shae Pierre is a 53 year old female who presents with right otalgia.  It is exquisitely tender.  It hurts to touch the pinna and certainly the external auditory canal.  Her left ear has mild tenderness too but that examination  looks normal.  The right ear has a little bit of what appears to be old drop debris but I do not see jennifer infection.  However she does have some edema of the lateral canal.  I will treat her with Ciprodex and prednisone.  It is possible this is some sort of chondritis.  Other considerations are postherpetic neuralgia.  If medications fail the next step would be a CT scan of the neck with contrast that will include the ear and parotid gland and deep to the sites.    Sabas Fontana MD  Otolaryngology  [facility]

## 2019-02-08 NOTE — LETTER
2/8/2019         RE: Shae Pierre  [address] [address] [place] [id number]        Dear Colleague,    Thank you for referring your patient, Shae Pierre, to the [facility]. Please see a copy of my visit note below.    I am seeing this patient in consultation for ear infection at the request of the provider Dr. Robb Painting.    Chief Complaint - ear pain    History of Present Illness - Shae Pierre is a 53 year old female who presents with the new onset of ear pain. The patient describes this as starting on the right. It is very painful. Started 2 months ago. It came on suddenly, no upper respiratory infection and no prior ear infections. Had fluid in ear feeling on the right. Tried drops (cortisporin) and amoxicillin. Pain is still present, but no hearing loss. She felt the drops helped some. It is worse sleeping supine, sitting up helps. No pain to chew. Her jaw hurts and into head. She has migraines. Started after a massage. The patient is not sure whether or not they grind their teeth, and no recent dental work. No teeth pain.        Past Medical History -   Patient Active Problem List   Diagnosis     Dermatophytosis of nail     Obesity     CARDIOVASCULAR SCREENING; LDL GOAL LESS THAN 160     Migraine headache     Breast cyst     Urge incontinence of urine     Benign essential hypertension       Current Medications -   Current Outpatient Medications:      ADVIL 200 MG OR TABS, TAKE 1 TO 2 TABLETS EVERY 4 TO 6 HOURS AS NEEDED WITH FOOD, Disp: 120, Rfl: 0     estradiol (ESTRACE) 1 MG tablet, Take 1 tablet (1 mg) by mouth daily, Disp: 90 tablet, Rfl: 3     GLUCOSAMINE SULFATE PO, Take 500 mg by mouth daily, Disp: , Rfl:      MULTI-VITAMIN OR TABS, 1 TABLET DAILY, Disp: 30, Rfl: 0     Omega-3 Fatty Acids (FISH OIL) 435 MG CAPS, , Disp: , Rfl:      SUMAtriptan (IMITREX) 100 MG tablet, Take 1 tablet (100 mg) by mouth at onset of headache for migraine May repeat in 2 hours prn: max 2/day, Disp: 12  "tablet, Rfl: 4    Allergies -   Allergies   Allergen Reactions     Codeine Other (See Comments) and Difficulty breathing     Throat closes     Morphine Other (See Comments) and Difficulty breathing     Throat closes     Sulfa Drugs Hives     Staples Other (See Comments) and Rash     Rash and infection after knee surgery       Social History -   Social History     Socioeconomic History     Marital status:      Spouse name: None     Number of children: None     Years of education: None     Highest education level: None   Social Needs     Financial resource strain: None     Food insecurity - worry: None     Food insecurity - inability: None     Transportation needs - medical: None     Transportation needs - non-medical: None   Occupational History     Occupation:      Employer: [employer]     Comment: [employer]   Tobacco Use     Smoking status: Former Smoker     Types: Cigarettes     Last attempt to quit: 2010     Years since quittin.8     Smokeless tobacco: Never Used   Substance and Sexual Activity     Alcohol use: Yes     Comment: occ     Drug use: No     Sexual activity: Not Currently     Partners: Male   Other Topics Concern     Parent/sibling w/ CABG, MI or angioplasty before 65F 55M? No   Social History Narrative     None       Family History -   Family History   Problem Relation Age of Onset     Breast Cancer Maternal Grandmother      Hypertension Father      C.A.D. Father      Alzheimer Disease Father      Lipids Brother      Lipids Brother      Lipids Brother      Lipids Brother      Lipids Sister      Lipids Sister      Circulatory Mother         AAA     Chronic Obstructive Pulmonary Disease Mother      Breast Cancer Maternal Aunt        Review of Systems - As per HPI and PMHx, otherwise 7 system review of the head and neck negative.    Physical Exam  /88   Ht 1.575 m (5' 2\")   Wt 91.2 kg (201 lb)   LMP 2012   BMI 36.76 kg/m     General " - The patient is in no distress.  Alert and oriented to person and place, answers questions and cooperates with examination appropriately.   Neurologic - CN II-XII are grossly intact, no focal neurologic deficits.   Voice and Breathing - The patient was breathing comfortably without the use of accessory muscles. There was no wheezing, stridor, or stertor.  The patients voice was clear and strong.  Eyes - Extraocular movements intact. Sclera were not icteric or injected, conjunctiva were pink and moist.  Ears -the right pinna was examined and appears normal.  However manipulation of the pinna or lateral ear canal exhibits significant pain.  Placing the speculum in the right ear canal causes pain.  She seems to have some edema albeit mild at the right lateral ear canal.  I do not see any lesions of the ear canal.  She has some residual white drop debris.  She may have a small keratin crust on the inferior aspect of the TM.  I do not see any effusions.  The left ear canal and pinna were normal.  No otitis externa.  The left tympanic membrane is normal left middle ear effusions normal.    Mouth - Dentition in fair condition, no masses, ulcerations, or erosions noted on examination of mucosa. The tongue is mobile and midline, and the uvula is midline on elevation.  Palpation of the TMJs were mildly tender, but not as tender as touching the right lateral ear canal.  Throat - The walls of the oropharynx were smooth, symmetric, and had no lesions or ulcerations. The uvula was midline on elevation.  Tonsils 1+ and quite no masses.  Neck - Palpation of the occipital, submental, submandibular, internal jugular chain, and supraclavicular nodes did not demonstrate any abnormal lymph nodes or masses. Palpation of the thyroid was soft and smooth, with no nodules or goiter appreciated.  The trachea was mobile and midline.  Neurological - Cranial nerves 2 through 12 were grossly intact. No focal neurologic deficits.      A/P - Shae  Ignacio is a 53 year old female who presents with left otalgia.  It is exquisitely tender.  It hurts to touch the pinna and certainly the external auditory canal.  Her left ear has mild tenderness to but that examination looks normal.  The right ear has a little bit of what appears to be old drop debris but I do not see jennifer infection.  However she does have some edema of the lateral canal.  I will treat her with Ciprodex and prednisone.  It is possible this is some sort of chondritis.  Other considerations are postherpetic neuralgia.  If medications fail the next step would be a CT scan of the neck with contrast that will include the ear and parotid gland and deep to the sites.    Sabas Fontana MD  Otolaryngology  [facility]              Again, thank you for allowing me to participate in the care of your patient.        Sincerely,        Sabas Fontana MD

## 2019-02-11 DIAGNOSIS — H60.391 INFECTIVE OTITIS EXTERNA, RIGHT: Primary | ICD-10-CM

## 2019-02-11 RX ORDER — DEXAMETHASONE SODIUM PHOSPHATE 1 MG/ML
SOLUTION/ DROPS OPHTHALMIC
Qty: 5 ML | Refills: 1 | Status: SHIPPED | OUTPATIENT
Start: 2019-02-11 | End: 2019-05-10

## 2019-02-11 RX ORDER — OFLOXACIN 3 MG/ML
3 SOLUTION AURICULAR (OTIC) 2 TIMES DAILY
Qty: 5 ML | Refills: 1 | Status: SHIPPED | OUTPATIENT
Start: 2019-02-11 | End: 2019-06-07

## 2019-03-05 ENCOUNTER — TELEPHONE (OUTPATIENT)
Dept: OTOLARYNGOLOGY | Facility: CLINIC | Age: 54
End: 2019-03-05

## 2019-03-05 DIAGNOSIS — H92.02 OTALGIA, LEFT: Primary | ICD-10-CM

## 2019-03-05 NOTE — TELEPHONE ENCOUNTER
Reason for Call:  Other call back    Detailed comments: Patient was instructed to call if things weren't getting better and then get a CT scan. Please advise.     Phone Number Patient can be reached at: Home number on file [phone] (home)    Best Time: any     Can we leave a detailed message on this number? YES    Call taken on 3/5/2019 at 3:08 PM by Case Ashley

## 2019-03-06 NOTE — TELEPHONE ENCOUNTER
Called and left patient a detailed message in regards to her getting a CT done. I asked that she call us back if she feels the need to have it done before Monday, due to  being out of the office till then. If she wants it done, I will have to have  put it in and sign it for her.

## 2019-03-07 NOTE — TELEPHONE ENCOUNTER
placed order for CT scan for the patient while  is away from clinic. Patient will schedule imaging and will be expecting a call from  when he returns to clinic with the results

## 2019-03-11 ENCOUNTER — HOSPITAL ENCOUNTER (OUTPATIENT)
Dept: CT IMAGING | Facility: CLINIC | Age: 54
Discharge: HOME | End: 2019-03-11
Attending: OTOLARYNGOLOGY | Admitting: OTOLARYNGOLOGY
Payer: COMMERCIAL

## 2019-03-11 DIAGNOSIS — H92.02 OTALGIA, LEFT: ICD-10-CM

## 2019-03-11 PROCEDURE — 70491 CT SOFT TISSUE NECK W/DYE: CPT

## 2019-03-11 PROCEDURE — 25000128 H RX IP 250 OP 636: Performed by: RADIOLOGY

## 2019-03-11 PROCEDURE — 25000125 ZZHC RX 250: Performed by: RADIOLOGY

## 2019-03-11 RX ORDER — IOPAMIDOL 755 MG/ML
80 INJECTION, SOLUTION INTRAVASCULAR ONCE
Status: COMPLETED | OUTPATIENT
Start: 2019-03-11 | End: 2019-03-11

## 2019-03-11 RX ADMIN — SODIUM CHLORIDE 80 ML: 9 INJECTION, SOLUTION INTRAVENOUS at 12:38

## 2019-03-11 RX ADMIN — IOPAMIDOL 80 ML: 755 INJECTION, SOLUTION INTRAVENOUS at 12:38

## 2019-03-19 DIAGNOSIS — H92.01 OTALGIA, RIGHT: Primary | ICD-10-CM

## 2019-03-19 RX ORDER — CYCLOBENZAPRINE HCL 5 MG
5-10 TABLET ORAL 3 TIMES DAILY PRN
Qty: 30 TABLET | Refills: 3 | Status: SHIPPED | OUTPATIENT
Start: 2019-03-19 | End: 2021-04-19

## 2019-03-19 NOTE — PROGRESS NOTES
I gave pending the results of her CT scan over the phone.  It is her right ear that continues to bother her and this waxes and wanes.  Her CT scan did not show any significant abnormality of the ear or the head or neck that could radiate into the ear.  She does feel like drops sometimes to the pain.  In addition prednisone seem to help but not completely within the symptoms return following cessation of the prednisone.  I still think this could possibly be her jaw so we will try Flexeril at night and during the day next.  She should try hot packs and a soft diet for the next couple weeks.  If this fails I recommend she return to clinic for nasopharyngoscopy to rule out any throat or mucosal lesions, and if that is clear we can consider gabapentin.

## 2019-05-09 ENCOUNTER — TELEPHONE (OUTPATIENT)
Dept: OTOLARYNGOLOGY | Facility: CLINIC | Age: 54
End: 2019-05-09

## 2019-05-09 NOTE — TELEPHONE ENCOUNTER
Called and left message for patient to return call to [phone] today and [phone] after today.    Aaron Jaimes RN....5/9/2019 11:27 AM

## 2019-05-10 NOTE — TELEPHONE ENCOUNTER
Called and spoke to patient.   Updated about Dr. Fontana's recommendations.   Patient will follow up with Dr. Fontana for scope.   Appointment scheduled.   Milli Anna RN

## 2019-05-10 NOTE — TELEPHONE ENCOUNTER
Called and spoke to patient.   Patient states that she is still having pain in the right ear that comes and goes. Pain is directly in the ear.   Patient uses heating pads that sooths it but never resolves.   Patient was assessed at the dentist with no issues reported.    Patient is taking Flexeril PRN with little relief.   Will report new information to provider for review.   Milli Anna RN

## 2019-06-07 ENCOUNTER — OFFICE VISIT (OUTPATIENT)
Dept: OTOLARYNGOLOGY | Facility: CLINIC | Age: 54
End: 2019-06-07
Payer: COMMERCIAL

## 2019-06-07 VITALS
WEIGHT: 201 LBS | SYSTOLIC BLOOD PRESSURE: 120 MMHG | DIASTOLIC BLOOD PRESSURE: 80 MMHG | HEIGHT: 62 IN | BODY MASS INDEX: 36.99 KG/M2

## 2019-06-07 DIAGNOSIS — H92.01 REFERRED OTALGIA OF RIGHT EAR: Primary | ICD-10-CM

## 2019-06-07 PROCEDURE — 99214 OFFICE O/P EST MOD 30 MIN: CPT | Mod: 25 | Performed by: OTOLARYNGOLOGY

## 2019-06-07 PROCEDURE — 31575 DIAGNOSTIC LARYNGOSCOPY: CPT | Performed by: OTOLARYNGOLOGY

## 2019-06-07 RX ORDER — GABAPENTIN 300 MG/1
300 CAPSULE ORAL 3 TIMES DAILY
Qty: 90 CAPSULE | Refills: 3 | Status: SHIPPED | OUTPATIENT
Start: 2019-06-07 | End: 2021-04-19

## 2019-06-07 ASSESSMENT — MIFFLIN-ST. JEOR: SCORE: 1474.98

## 2019-06-07 NOTE — PROGRESS NOTES
Chief Complaint - ear pain    History of Present Illness - Shae Pierre is a 54 year old female who returns with right ear pain. The patient describes this as being painful. It started 6 or more months ago. It came on suddenly, no upper respiratory infection and no prior ear infections. Had fluid feeling in the ear on the right. She tried drops (cortisporin) and amoxicillin. Pain is still present, but no hearing loss. She felt the drops helped some. It is worse sleeping supine, sitting up helps. No pain to chew. Her jaw hurts and into head. Started after a massage. The patient is not sure whether or not they grind their teeth, and no recent dental work. No teeth pain. CT neck was mostly normal. Some cervical spine disease and a tortuous right carotid artery. She returns and notes she tried things for TMJ. She tried flexeryl, and this helped a little. Pain around ear is better, but she still has pain in ear. Also it feels a little muffled. Wind makes it worse. It waxes and wanes. It never goes away. Eating and swallowing doesn't change anything. Occasional hears a pop in right ear. She has some sound sensitivity. She has a long migraine history. Lately they have been good. She feels the pain is hot and has a pressure. At its worst it feels like a fire.       Past Medical History -   Patient Active Problem List   Diagnosis     Dermatophytosis of nail     Obesity     CARDIOVASCULAR SCREENING; LDL GOAL LESS THAN 160     Migraine headache     Breast cyst     Urge incontinence of urine     Benign essential hypertension       Current Medications -   Current Outpatient Medications:      ADVIL 200 MG OR TABS, TAKE 1 TO 2 TABLETS EVERY 4 TO 6 HOURS AS NEEDED WITH FOOD, Disp: 120, Rfl: 0     cyclobenzaprine (FLEXERIL) 5 MG tablet, Take 1-2 tablets (5-10 mg) by mouth 3 times daily as needed for muscle spasms, Disp: 30 tablet, Rfl: 3     estradiol (ESTRACE) 1 MG tablet, Take 1 tablet (1 mg) by mouth daily, Disp: 90 tablet, Rfl:  3     GLUCOSAMINE SULFATE PO, Take 500 mg by mouth daily, Disp: , Rfl:      MULTI-VITAMIN OR TABS, 1 TABLET DAILY, Disp: 30, Rfl: 0     Omega-3 Fatty Acids (FISH OIL) 435 MG CAPS, , Disp: , Rfl:      SUMAtriptan (IMITREX) 100 MG tablet, Take 1 tablet (100 mg) by mouth at onset of headache for migraine May repeat in 2 hours prn: max 2/day, Disp: 12 tablet, Rfl: 4    Allergies -   Allergies   Allergen Reactions     Codeine Other (See Comments) and Difficulty breathing     Throat closes     Morphine Other (See Comments) and Difficulty breathing     Throat closes     Sulfa Drugs Hives     Staples Other (See Comments) and Rash     Rash and infection after knee surgery       Social History -   Social History     Socioeconomic History     Marital status:      Spouse name: None     Number of children: None     Years of education: None     Highest education level: None   Social Needs     Financial resource strain: None     Food insecurity - worry: None     Food insecurity - inability: None     Transportation needs - medical: None     Transportation needs - non-medical: None   Occupational History     Occupation:      Employer: [employer]     Comment: [employer]   Tobacco Use     Smoking status: Former Smoker     Types: Cigarettes     Last attempt to quit: 2010     Years since quittin.8     Smokeless tobacco: Never Used   Substance and Sexual Activity     Alcohol use: Yes     Comment: occ     Drug use: No     Sexual activity: Not Currently     Partners: Male   Other Topics Concern     Parent/sibling w/ CABG, MI or angioplasty before 65F 55M? No   Social History Narrative     None       Family History -   Family History   Problem Relation Age of Onset     Breast Cancer Maternal Grandmother      Hypertension Father      C.A.D. Father      Alzheimer Disease Father      Lipids Brother      Lipids Brother      Lipids Brother      Lipids Brother      Lipids Sister      Lipids  Sister      Circulatory Mother         AAA     Chronic Obstructive Pulmonary Disease Mother      Breast Cancer Maternal Aunt        Review of Systems - As per HPI and PMHx, otherwise 7 system review of the head and neck negative.    Physical Exam  LMP 04/05/2012   General - The patient is in no distress.  Alert and oriented to person and place, answers questions and cooperates with examination appropriately.   Neurologic - CN II-XII are grossly intact, no focal neurologic deficits.   Voice and Breathing - The patient was breathing comfortably without the use of accessory muscles. There was no wheezing, stridor, or stertor.  The patients voice was clear and strong.  Eyes - Extraocular movements intact. Sclera were not icteric or injected, conjunctiva were pink and moist.  Ears -the right pinna was examined and appears normal.  No tenderness. Some wax I cleaned out, but this didn't cause the pain she is feeling. I do not see any lesions of the ear canal.  Tympanic membrane intact. I do not see any effusions.  The left ear canal and pinna were normal.  No otitis externa.  The left tympanic membrane is normal left middle ear effusions normal.    Mouth - Dentition in fair condition, no masses, ulcerations, or erosions noted on examination of mucosa. The tongue is mobile and midline, and the uvula is midline on elevation.  Palpation of the TMJs were not tender.  Throat - The walls of the oropharynx were smooth, symmetric, and had no lesions or ulcerations. The uvula was midline on elevation.  Tonsils 1+ and quite no masses. She gags, but no pain in throat.  Neck - Palpation of the occipital, submental, submandibular, internal jugular chain, and supraclavicular nodes did not demonstrate any abnormal lymph nodes or masses. Palpation of the thyroid was soft and smooth, with no nodules or goiter appreciated.  The trachea was mobile and midline.  Neurological - Cranial nerves 2 through 12 were grossly intact. No focal  neurologic deficits.    Flexible Endoscopy -     Given the chief complaint, history, and physical examination, and to best visualize the airway anatomy, I proceeded with a fiberoptic examination.  Color photographs were taken for the permanent medical record. First I sprayed the right side of the nose with a mixture of lidocaine and neosynephrine.   I then passed the scope through the nasal cavity.  The nasal cavity was unremarkable.  The nasopharynx was mucosally covered and symmetric.  The Eustachian tube openings were unobstructed.  Going further down I had a clear view of the base of tongue which had normal appearing lingual tonsillar tissue.  The base of tongue was free of lesions, masses, and the vallecula was open.  The epiglottis was smooth and mucosally covered.  The supraglottic larynx was then clearly visualized and was normal.  The vocal cords moved smoothly and symmetrically, they were white and no lesions were seen.  The pyriform sinuses were open, and the limited view of the postcricoid region did not show any lesions.        A/P - Shae Pierre is a 54 year old female who returns with right otalgia. It hurts in the ear now, not around it. Feels like fire, waxes and wanes. Again exam is normal. nasopharyngoscopy is normal. CT was normal aside from cervical spine disease. This could be the cause. Migraine headache may also be the cause. I feel whatever is causing this is referred pain. We have rule-out malignancy or mass. At this point, I recommend gabapentin, and will have her titrate this. She will let me know how things are going in 1-2 weeks.     Sabas Fontana MD  Otolaryngology  [facility], [place]

## 2019-06-07 NOTE — PATIENT INSTRUCTIONS
General Scheduling Information  To schedule your CT/MRI scan, please contact [facility] at [phone]   [address] [place] [address]    To schedule your Surgery, please contact our Specialty Schedulers at [phone]    ENT Clinic Locations Clinic Hours Telephone Number     Angel Delgado  [address] [place] [address]   Tuesday:       8:00am -- 4:00pm    Wednesday:  8:00am - 4:00pm   To schedule an appointment with   Dr. Fontana,   please contact our   Specialty Scheduling Department at:     [phone]       Angel Melendrez  [address] [place] [address]   Friday:          8:00am - 4:00pm         Urgent Care Locations Clinic Hours Telephone Numbers     Angel Fowler  [address] [place] [address]     Monday-Friday:     11:00pm - 9:00pm    Saturday-Sunday:  9:00am - 5:00pm   [phone]     Angel Melendrez  [address] [place] [address]     Monday-Friday:      5:00pm - 9:00pm     Saturday-Sunday:  9:00am - 5:00pm   [phone]

## 2019-06-07 NOTE — LETTER
6/7/2019         RE: Shae Pierre  [address] [place] [id number]        Dear Colleague,    Thank you for referring your patient, Shae Pierre, to the [facility] [place] [facility]. Please see a copy of my visit note below.    Chief Complaint - ear pain    History of Present Illness - Shae Pierre is a 54 year old female who returns with right ear pain. The patient describes this as being painful. It started 6 or more months ago. It came on suddenly, no upper respiratory infection and no prior ear infections. Had fluid feeling in the ear on the right. She tried drops (cortisporin) and amoxicillin. Pain is still present, but no hearing loss. She felt the drops helped some. It is worse sleeping supine, sitting up helps. No pain to chew. Her jaw hurts and into head. Started after a massage. The patient is not sure whether or not they grind their teeth, and no recent dental work. No teeth pain. CT neck was mostly normal. Some cervical spine disease and a tortuous right carotid artery. She returns and notes she tried things for TMJ. She tried flexeryl, and this helped a little. Pain around ear is better, but she still has pain in ear. Also it feels a little muffled. Wind makes it worse. It waxes and wanes. It never goes away. Eating and swallowing doesn't change anything. Occasional hears a pop in right ear. She has some sound sensitivity. She has a long migraine history. Lately they have been good. She feels the pain is hot and has a pressure. At its worst it feels like a fire.       Past Medical History -   Patient Active Problem List   Diagnosis     Dermatophytosis of nail     Obesity     CARDIOVASCULAR SCREENING; LDL GOAL LESS THAN 160     Migraine headache     Breast cyst     Urge incontinence of urine     Benign essential hypertension       Current Medications -   Current Outpatient Medications:      ADVIL 200 MG OR TABS, TAKE 1 TO 2 TABLETS EVERY 4 TO 6 HOURS AS NEEDED WITH FOOD, Disp: 120, Rfl: 0      cyclobenzaprine (FLEXERIL) 5 MG tablet, Take 1-2 tablets (5-10 mg) by mouth 3 times daily as needed for muscle spasms, Disp: 30 tablet, Rfl: 3     estradiol (ESTRACE) 1 MG tablet, Take 1 tablet (1 mg) by mouth daily, Disp: 90 tablet, Rfl: 3     GLUCOSAMINE SULFATE PO, Take 500 mg by mouth daily, Disp: , Rfl:      MULTI-VITAMIN OR TABS, 1 TABLET DAILY, Disp: 30, Rfl: 0     Omega-3 Fatty Acids (FISH OIL) 435 MG CAPS, , Disp: , Rfl:      SUMAtriptan (IMITREX) 100 MG tablet, Take 1 tablet (100 mg) by mouth at onset of headache for migraine May repeat in 2 hours prn: max 2/day, Disp: 12 tablet, Rfl: 4    Allergies -   Allergies   Allergen Reactions     Codeine Other (See Comments) and Difficulty breathing     Throat closes     Morphine Other (See Comments) and Difficulty breathing     Throat closes     Sulfa Drugs Hives     Staples Other (See Comments) and Rash     Rash and infection after knee surgery       Social History -   Social History     Socioeconomic History     Marital status:      Spouse name: None     Number of children: None     Years of education: None     Highest education level: None   Social Needs     Financial resource strain: None     Food insecurity - worry: None     Food insecurity - inability: None     Transportation needs - medical: None     Transportation needs - non-medical: None   Occupational History     Occupation:      Employer: [employer]     Comment: [employer]   Tobacco Use     Smoking status: Former Smoker     Types: Cigarettes     Last attempt to quit: 2010     Years since quittin.8     Smokeless tobacco: Never Used   Substance and Sexual Activity     Alcohol use: Yes     Comment: occ     Drug use: No     Sexual activity: Not Currently     Partners: Male   Other Topics Concern     Parent/sibling w/ CABG, MI or angioplasty before 65F 55M? No   Social History Narrative     None       Family History -   Family History   Problem Relation  Age of Onset     Breast Cancer Maternal Grandmother      Hypertension Father      C.A.D. Father      Alzheimer Disease Father      Lipids Brother      Lipids Brother      Lipids Brother      Lipids Brother      Lipids Sister      Lipids Sister      Circulatory Mother         AAA     Chronic Obstructive Pulmonary Disease Mother      Breast Cancer Maternal Aunt        Review of Systems - As per HPI and PMHx, otherwise 7 system review of the head and neck negative.    Physical Exam  LMP 04/05/2012   General - The patient is in no distress.  Alert and oriented to person and place, answers questions and cooperates with examination appropriately.   Neurologic - CN II-XII are grossly intact, no focal neurologic deficits.   Voice and Breathing - The patient was breathing comfortably without the use of accessory muscles. There was no wheezing, stridor, or stertor.  The patients voice was clear and strong.  Eyes - Extraocular movements intact. Sclera were not icteric or injected, conjunctiva were pink and moist.  Ears -the right pinna was examined and appears normal.  No tenderness. Some wax I cleaned out, but this didn't cause the pain she is feeling. I do not see any lesions of the ear canal.  Tympanic membrane intact. I do not see any effusions.  The left ear canal and pinna were normal.  No otitis externa.  The left tympanic membrane is normal left middle ear effusions normal.    Mouth - Dentition in fair condition, no masses, ulcerations, or erosions noted on examination of mucosa. The tongue is mobile and midline, and the uvula is midline on elevation.  Palpation of the TMJs were not tender.  Throat - The walls of the oropharynx were smooth, symmetric, and had no lesions or ulcerations. The uvula was midline on elevation.  Tonsils 1+ and quite no masses. She gags, but no pain in throat.  Neck - Palpation of the occipital, submental, submandibular, internal jugular chain, and supraclavicular nodes did not demonstrate any  abnormal lymph nodes or masses. Palpation of the thyroid was soft and smooth, with no nodules or goiter appreciated.  The trachea was mobile and midline.  Neurological - Cranial nerves 2 through 12 were grossly intact. No focal neurologic deficits.    Flexible Endoscopy -     Given the chief complaint, history, and physical examination, and to best visualize the airway anatomy, I proceeded with a fiberoptic examination.  Color photographs were taken for the permanent medical record. First I sprayed the right side of the nose with a mixture of lidocaine and neosynephrine.   I then passed the scope through the nasal cavity.  The nasal cavity was unremarkable.  The nasopharynx was mucosally covered and symmetric.  The Eustachian tube openings were unobstructed.  Going further down I had a clear view of the base of tongue which had normal appearing lingual tonsillar tissue.  The base of tongue was free of lesions, masses, and the vallecula was open.  The epiglottis was smooth and mucosally covered.  The supraglottic larynx was then clearly visualized and was normal.  The vocal cords moved smoothly and symmetrically, they were white and no lesions were seen.  The pyriform sinuses were open, and the limited view of the postcricoid region did not show any lesions.        A/P - Shae Pierre is a 54 year old female who returns with right otalgia. It hurts in the ear now, not around it. Feels like fire, waxes and wanes. Again exam is normal. nasopharyngoscopy is normal. CT was normal aside from cervical spine disease. This could be the cause. Migraine headache may also be the cause. I feel whatever is causing this is referred pain. We have rule-out malignancy or mass. At this point, I recommend gabapentin, and will have her titrate this. She will let me know how things are going in 1-2 weeks.     Sabas Fontana MD  Otolaryngology  [facility]              Again, thank you for allowing me to participate in the care of  your patient.        Sincerely,        Sabas Fontana MD

## 2019-11-03 ENCOUNTER — HEALTH MAINTENANCE LETTER (OUTPATIENT)
Age: 54
End: 2019-11-03

## 2020-01-17 DIAGNOSIS — N95.1 SYMPTOMATIC MENOPAUSAL OR FEMALE CLIMACTERIC STATES: ICD-10-CM

## 2020-01-17 RX ORDER — ESTRADIOL 1 MG/1
1 TABLET ORAL DAILY
Qty: 60 TABLET | Refills: 0 | Status: SHIPPED | OUTPATIENT
Start: 2020-01-17 | End: 2021-04-19

## 2020-01-17 NOTE — TELEPHONE ENCOUNTER
LOV: 11/2018  No future appointment scheduled. Patient due for office visit. Janette refill provided. Patient needs to be seen for further refills.     Jony RONQUILLO RN   Specialty Clinics

## 2020-02-10 ENCOUNTER — HEALTH MAINTENANCE LETTER (OUTPATIENT)
Age: 55
End: 2020-02-10

## 2020-07-17 ENCOUNTER — NURSE TRIAGE (OUTPATIENT)
Dept: NURSING | Facility: CLINIC | Age: 55
End: 2020-07-17

## 2020-07-17 DIAGNOSIS — Z11.59 SCREENING FOR VIRAL DISEASE: Primary | ICD-10-CM

## 2020-07-17 NOTE — TELEPHONE ENCOUNTER
"Patient is calling requesting COVID serologic antibody testing.  NOTE: Serologic testing is a blood test for 'antibodies' which are made at 10-14 days after you have had symptoms of COVID or were exposed and had an asymptomatic infection.  This does NOT test you for 'active' infection or tell you if you are contagious.    Are you a healthcare worker?  Yes  Do you work for [employer]?   No  Do you currently have a cough, fever, body aches, shortness of breath, or difficulty breathing?  No  Have you been exposed to (or come into close contact with) someone who tested POSITIVE for COVID-19 or someone who had a possible case of COVID-19?  No exposure.   Did you previously have cough, fever, body aches, shortness of breath, or difficulty breathing that have now resolved?   Has had previous covid symptoms.     Symptoms started > 14 days ago. Lab order placed per SARS-CoV-2 Serology test Standing Order using indication \"Previously symptomatic >14d since onset, currently asymptomatic\" and diagnosis code \"Screening for viral disease\" (Z11.59)    The patient was informed: \"Testing is limited each day and it may take time for testing to be available to everyone who has called. You will receive a call within 48-72 hours to schedule the serology testing. Please confirm the best number to reach you is [phone]. If you have any questions about scheduling, call [phone].\"       "

## 2020-07-22 DIAGNOSIS — Z11.59 SCREENING FOR VIRAL DISEASE: ICD-10-CM

## 2020-07-22 PROCEDURE — 36415 COLL VENOUS BLD VENIPUNCTURE: CPT | Performed by: EMERGENCY MEDICINE

## 2020-07-22 PROCEDURE — 86769 SARS-COV-2 COVID-19 ANTIBODY: CPT | Mod: 90 | Performed by: EMERGENCY MEDICINE

## 2020-07-22 PROCEDURE — 99000 SPECIMEN HANDLING OFFICE-LAB: CPT | Performed by: EMERGENCY MEDICINE

## 2020-07-22 NOTE — LETTER
July 24, 2020        Shae Pierre  [address] [place] [address]      COVID-19 Antibody Screen   Date Value Ref Range Status   07/22/2020 Negative  Final     Comment:     No COVID-19 antibodies detected.  Patients within 10 days of symptom onset for   COVID-19 may not produce sufficient levels of detectable antibodies.    Immunocompromised COVID-19 patients may take longer to develop antibodies.       COVID-19 Antibody, IgG Titer   Date Value Ref Range Status   07/22/2020 Not Applicable  Final     Comment:     Qualitative screen for total antibodies to COVID-19 (SARS-CoV-2) with   semi-quantitative measurement of IgG COVID-19 antibodies by endpoint titer.    COVID-19 antibodies may be elevated due to a past or current infection.  Negative results do not rule out COVID-19 infection.  Results from antibody   testing should not be used as the sole basis to diagnose or exclude SARS-CoV-2   infection or to inform infection status.  COVID-19 PCR test should be ordered   if current infection is suspected.  False positive results may occur in rare   cases due to cross-reacting antibodies.  This test was developed and its performance characteristics determined by the   [facility]),   which is regulated under CLIA as qualified to perform high-complexity testing.    This test has not been reviewed by the FDA.  Testing performed by [facility], [facility], [address], [address], [place] [address]         No results found for: COVAB      You have tested NEGATIVE for COVID-19 antibodies. This suggests you have not had or been exposed to COVID-19. But it does not mean that for sure.     The test finds antibodies in most people 10 days after they get sick. For some people, it takes longer than 10 days for antibodies to show up. Others may never show antibodies against COVID-19, especially if they  have weak immune systems.    If you have COVID-19 symptoms now, please stay home and away from others.     What is antibody testing?    This is a kind of blood test. We take a small sample of your blood, and then test it for something called  antibodies.      Your body makes antibodies to fight infection. If your blood has antibodies for a certain germ, it means you ve been infected with that germ in the past.     Sometimes, antibodies stay in your body for years after you ve had the infection. They can be there even if the germ didn t make you sick. They are a sign that your body fought off the infection.    Will this test find antibodies in everyone who s had COVID-19?    No. The test finds antibodies in most people 10 days after they get sick. For some people, it takes longer than 10 days for antibodies to show up. Others may never show antibodies against COVID-19, especially if they have weak immune systems.    What does it mean if the test finds COVID-19 antibodies?    If we find these antibodies, it suggests:     This person has had the virus.     Their body s immune system fought the virus.     We don t know if this will help protect someone from getting COVID-19 again. Scientists are still learning about this.    What are the signs of COVID-19?    Signs of COVID-19 can appear from 2 to 14 days (up to 2 weeks) after you re infected. Some people have no symptoms or only mild symptoms. Others get very sick. The most common symptoms are:          Cough    Shortness of breath or trouble breathing  Or at least 2 of these symptoms:    Fever    Chills    Repeated shaking with chills    Muscle pain    Headache    Sore throat    Losing your sense of taste or smell    You may have other symptoms. Please contact your doctor or clinic for any symptoms that worry you.    Where can I get more information?     To learn the [place] s guidelines for staying home, please visit the [facility] website at  [email or web address]/[email or web address]/[email or web address]/[email or web address]    To learn more about COVID-19 and how to care for yourself at home, please visit the CDC website at https://www.cdc.gov/coronavirus/2019-ncov/about/steps-when-sick.html    For more options for care at [facility], please visit our website at [email or web address]    [place] [facility] ([facility]) COVID-19 Hotline:  [phone]

## 2020-07-24 LAB
COVID-19 SPIKE RBD ABY TITER: NORMAL
COVID-19 SPIKE RBD ABY: NEGATIVE

## 2020-10-06 ENCOUNTER — OFFICE VISIT (OUTPATIENT)
Dept: DERMATOLOGY | Facility: CLINIC | Age: 55
End: 2020-10-06
Payer: COMMERCIAL

## 2020-10-06 VITALS — DIASTOLIC BLOOD PRESSURE: 92 MMHG | SYSTOLIC BLOOD PRESSURE: 151 MMHG | HEART RATE: 86 BPM | OXYGEN SATURATION: 98 %

## 2020-10-06 DIAGNOSIS — L91.8 SKIN TAG: Primary | ICD-10-CM

## 2020-10-06 DIAGNOSIS — L81.4 LENTIGO: ICD-10-CM

## 2020-10-06 DIAGNOSIS — D18.01 ANGIOMA OF SKIN: ICD-10-CM

## 2020-10-06 DIAGNOSIS — L82.0 INFLAMED SEBORRHEIC KERATOSIS: ICD-10-CM

## 2020-10-06 DIAGNOSIS — L82.1 SEBORRHEIC KERATOSIS: ICD-10-CM

## 2020-10-06 DIAGNOSIS — D23.9 DERMAL NEVUS: ICD-10-CM

## 2020-10-06 PROCEDURE — 11200 RMVL SKIN TAGS UP TO&INC 15: CPT | Mod: 59 | Performed by: DERMATOLOGY

## 2020-10-06 PROCEDURE — 17110 DESTRUCTION B9 LES UP TO 14: CPT | Performed by: DERMATOLOGY

## 2020-10-06 PROCEDURE — 99213 OFFICE O/P EST LOW 20 MIN: CPT | Mod: 25 | Performed by: DERMATOLOGY

## 2020-10-06 NOTE — LETTER
10/6/2020         RE: Shae Pierre  [address] [address] [place] [id number]        Dear Colleague,    Thank you for referring your patient, Shae Pierre, to the [facility]. Please see a copy of my visit note below.    Shae Pierre is a 55 year old year old female patient here today for brown spots on face, trunk and ext.   .  Patient states this has been present for a w hile.  Patient reports the following symptoms:  itching.  Patient reports the following previous treatments none.  These treatments did not work.  Patient reports the following modifying factors none.  Associated symptoms: none.  Patient has no other skin complaints today.  Remainder of the HPI, Meds, PMH, Allergies, FH, and SH was reviewed in chart.      Past Medical History:   Diagnosis Date     Dysplasia of cervix (uteri) 1998     Fibroids 2/22/2012     Other abnormal Pap smear and cervical HPV (human papapillomavirus) 9/29/2008    EM>40 and pos HPV unknown type rec rpt pap 6 mos/11/08  1/26/2009 F/u pap was normal  Will have next pap in 1 year.      Other acne      PONV (postoperative nausea and vomiting)        Past Surgical History:   Procedure Laterality Date     COLONOSCOPY N/A 4/16/2018    Procedure: COLONOSCOPY;  Colonoscopy;  Surgeon: Chuck Donovan MD;  Location: [facility]     EXCISE MASS TRUNK  7/13/2012    Procedure: EXCISE MASS TRUNK;  Excisional Removal Chest Wall Cysts X2;  Surgeon: Dennys Henriquez MD;  Location: [facility]     HYSTERECTOMY, PAP NO LONGER INDICATED       LAPAROSCOPIC ASSISTED HYSTERECTOMY VAGINAL, BILATERAL SALPINGO-OOPHORECTOMY, COMBINED  4/5/2012    LAVH-BSO     SURGICAL HISTORY OF -       LEEP     SURGICAL HISTORY OF -       Cryotherapy      SURGICAL HISTORY OF -       Colpo     SURGICAL HISTORY OF -       left knee/patella surgery        Family History   Problem Relation Age of Onset     Breast Cancer Maternal Grandmother      Hypertension Father      C.A.D. Father      Alzheimer Disease  Father      Lipids Brother      Lipids Brother      Lipids Brother      Lipids Brother      Lipids Sister      Lipids Sister      Circulatory Mother         AAA     Chronic Obstructive Pulmonary Disease Mother      Breast Cancer Maternal Aunt        Social History     Socioeconomic History     Marital status:      Spouse name: Not on file     Number of children: Not on file     Years of education: Not on file     Highest education level: Not on file   Occupational History     Occupation:      Employer: [employer]     Comment: [employer]   Social Needs     Financial resource strain: Not on file     Food insecurity     Worry: Not on file     Inability: Not on file     Transportation needs     Medical: Not on file     Non-medical: Not on file   Tobacco Use     Smoking status: Former Smoker     Types: Cigarettes     Quit date: 4/1/2010     Years since quitting: 10.5     Smokeless tobacco: Never Used   Substance and Sexual Activity     Alcohol use: Yes     Comment: occ     Drug use: No     Sexual activity: Not Currently     Partners: Male   Lifestyle     Physical activity     Days per week: Not on file     Minutes per session: Not on file     Stress: Not on file   Relationships     Social connections     Talks on phone: Not on file     Gets together: Not on file     Attends Congregational service: Not on file     Active member of club or organization: Not on file     Attends meetings of clubs or organizations: Not on file     Relationship status: Not on file     Intimate partner violence     Fear of current or ex partner: Not on file     Emotionally abused: Not on file     Physically abused: Not on file     Forced sexual activity: Not on file   Other Topics Concern     Parent/sibling w/ CABG, MI or angioplasty before 65F 55M? No   Social History Narrative     Not on file       Outpatient Encounter Medications as of 10/6/2020   Medication Sig Dispense Refill     MULTI-VITAMIN OR  TABS 1 TABLET DAILY 30 0     ADVIL 200 MG OR TABS TAKE 1 TO 2 TABLETS EVERY 4 TO 6 HOURS AS NEEDED WITH FOOD 120 0     cyclobenzaprine (FLEXERIL) 5 MG tablet Take 1-2 tablets (5-10 mg) by mouth 3 times daily as needed for muscle spasms (Patient not taking: Reported on 10/6/2020) 30 tablet 3     estradiol (ESTRACE) 1 MG tablet Take 1 tablet (1 mg) by mouth daily Must be seen in clinic for further refills: [phone] (Patient not taking: Reported on 10/6/2020) 60 tablet 0     gabapentin (NEURONTIN) 300 MG capsule Take 1 capsule (300 mg) by mouth 3 times daily (Patient not taking: Reported on 10/6/2020) 90 capsule 3     GLUCOSAMINE SULFATE PO Take 500 mg by mouth daily       Omega-3 Fatty Acids (FISH OIL) 435 MG CAPS        SUMAtriptan (IMITREX) 100 MG tablet Take 1 tablet (100 mg) by mouth at onset of headache for migraine May repeat in 2 hours prn: max 2/day (Patient not taking: Reported on 10/6/2020) 12 tablet 4     No facility-administered encounter medications on file as of 10/6/2020.              Review Of Systems  Skin: As above  Eyes: negative  Ears/Nose/Throat: negative  Respiratory: No shortness of breath, dyspnea on exertion, cough, or hemoptysis  Cardiovascular: negative  Gastrointestinal: negative  Genitourinary: negative  Musculoskeletal: negative  Neurologic: negative  Psychiatric: negative  Hematologic/Lymphatic/Immunologic: negative  Endocrine: negative      O:   NAD, WDWN, Alert & Oriented, Mood & Affect wnl, Vitals stable   Here today alone   BP (!) 151/92   Pulse 86   LMP 04/05/2012   SpO2 98%    General appearance normal   Vitals stable   Alert, oriented and in no acute distress      Following lymph nodes palpated: Occipital, Cervical, Supraclavicular no lad   L thigh, L arm, L temple, breast inflamed seborrheic keratosis    Tag on r breast        Stuck on papules and brown macules on trunk and ext   Red papules on trunk  Flesh colored papules on trunk     The remainder of the full exam was  normal; the following areas were examined:  conjunctiva/lids, oral mucosa, neck, peripheral vascular system, abdomen, lymph nodes, digits/nails, eccrine and apocrine glands, scalp/hair, face, neck, chest, abdomen, buttocks, back, RUE, LUE, RLE, LLE       Eyes: Conjunctivae/lids:Normal     ENT: Lips, buccal mucosa, tongue: normal    MSK:Normal    Cardiovascular: peripheral edema none    Pulm: Breathing Normal    Lymph Nodes: No Head and Neck Lymphadenopathy     Neuro/Psych: Orientation:Alert and Orientedx3 ; Mood/Affect:normal       A/P:  1. Seborrheic keratosis, lentigo, angioma, dermal nevus  2. Inflamed seborrheic keratosis x4  LN2:  Treated with LN2 for 5s for 1-2 cycles. Warned risks of blistering, pain, pigment change, scarring, and incomplete resolution.  Advised patient to return if lesions do not completely resolve.  Wound care sheet given.  3. Breast tag  LN2:  Treated with LN2 for 5s for 1-2 cycles. Warned risks of blistering, pain, pigment change, scarring, and incomplete resolution.  Advised patient to return if lesions do not completely resolve.  Wound care sheet given.    BENIGN LESIONS DISCUSSED WITH PATIENT:  I discussed the specifics of tumor, prognosis, and genetics of benign lesions.  I explained that treatment of these lesions would be purely cosmetic and not medically neccessary.  I discussed with patient different removal options including excision, cautery and /or laser.      Nature and genetics of benign skin lesions dicussed with patient.  Signs and Symptoms of skin cancer discussed with patient.  Patient encouraged to perform monthly skin exams.  UV precautions reviewed with patient.  Skin care regimen reviewed with patient: Eliminate harsh soaps, i.e. Dial, zest, irsih spring; Mild soaps such as Cetaphil or Dove sensitive skin, avoid hot or cold showers, aggressive use of emollients including vanicream, cetaphil or cerave discussed with patient.    Risks of non-melanoma skin cancer  discussed with patient   Return to clinic 12 months        Again, thank you for allowing me to participate in the care of your patient.        Sincerely,        Steffen Maldonado MD

## 2020-10-06 NOTE — PROGRESS NOTES
Shae Pierre is a 55 year old year old female patient here today for brown spots on face, trunk and ext.   .  Patient states this has been present for a w hile.  Patient reports the following symptoms:  itching.  Patient reports the following previous treatments none.  These treatments did not work.  Patient reports the following modifying factors none.  Associated symptoms: none.  Patient has no other skin complaints today.  Remainder of the HPI, Meds, PMH, Allergies, FH, and SH was reviewed in chart.      Past Medical History:   Diagnosis Date     Dysplasia of cervix (uteri) 1998     Fibroids 2/22/2012     Other abnormal Pap smear and cervical HPV (human papapillomavirus) 9/29/2008    EM>40 and pos HPV unknown type rec rpt pap 6 mos/11/08  1/26/2009 F/u pap was normal  Will have next pap in 1 year.      Other acne      PONV (postoperative nausea and vomiting)        Past Surgical History:   Procedure Laterality Date     COLONOSCOPY N/A 4/16/2018    Procedure: COLONOSCOPY;  Colonoscopy;  Surgeon: Chuck Donovan MD;  Location: [facility]     EXCISE MASS TRUNK  7/13/2012    Procedure: EXCISE MASS TRUNK;  Excisional Removal Chest Wall Cysts X2;  Surgeon: Dennys Henriquez MD;  Location: [facility]     HYSTERECTOMY, PAP NO LONGER INDICATED       LAPAROSCOPIC ASSISTED HYSTERECTOMY VAGINAL, BILATERAL SALPINGO-OOPHORECTOMY, COMBINED  4/5/2012    LAVH-BSO     SURGICAL HISTORY OF -       LEEP     SURGICAL HISTORY OF -       Cryotherapy      SURGICAL HISTORY OF -       Colpo     SURGICAL HISTORY OF -       left knee/patella surgery        Family History   Problem Relation Age of Onset     Breast Cancer Maternal Grandmother      Hypertension Father      C.A.D. Father      Alzheimer Disease Father      Lipids Brother      Lipids Brother      Lipids Brother      Lipids Brother      Lipids Sister      Lipids Sister      Circulatory Mother         AAA     Chronic Obstructive Pulmonary Disease Mother      Breast Cancer Maternal Aunt         Social History     Socioeconomic History     Marital status:      Spouse name: Not on file     Number of children: Not on file     Years of education: Not on file     Highest education level: Not on file   Occupational History     Occupation:      Employer: [employer]     Comment: [employer]   Social Needs     Financial resource strain: Not on file     Food insecurity     Worry: Not on file     Inability: Not on file     Transportation needs     Medical: Not on file     Non-medical: Not on file   Tobacco Use     Smoking status: Former Smoker     Types: Cigarettes     Quit date: 4/1/2010     Years since quitting: 10.5     Smokeless tobacco: Never Used   Substance and Sexual Activity     Alcohol use: Yes     Comment: occ     Drug use: No     Sexual activity: Not Currently     Partners: Male   Lifestyle     Physical activity     Days per week: Not on file     Minutes per session: Not on file     Stress: Not on file   Relationships     Social connections     Talks on phone: Not on file     Gets together: Not on file     Attends Moravian service: Not on file     Active member of club or organization: Not on file     Attends meetings of clubs or organizations: Not on file     Relationship status: Not on file     Intimate partner violence     Fear of current or ex partner: Not on file     Emotionally abused: Not on file     Physically abused: Not on file     Forced sexual activity: Not on file   Other Topics Concern     Parent/sibling w/ CABG, MI or angioplasty before 65F 55M? No   Social History Narrative     Not on file       Outpatient Encounter Medications as of 10/6/2020   Medication Sig Dispense Refill     MULTI-VITAMIN OR TABS 1 TABLET DAILY 30 0     ADVIL 200 MG OR TABS TAKE 1 TO 2 TABLETS EVERY 4 TO 6 HOURS AS NEEDED WITH FOOD 120 0     cyclobenzaprine (FLEXERIL) 5 MG tablet Take 1-2 tablets (5-10 mg) by mouth 3 times daily as needed for muscle spasms (Patient  not taking: Reported on 10/6/2020) 30 tablet 3     estradiol (ESTRACE) 1 MG tablet Take 1 tablet (1 mg) by mouth daily Must be seen in clinic for further refills: [phone] (Patient not taking: Reported on 10/6/2020) 60 tablet 0     gabapentin (NEURONTIN) 300 MG capsule Take 1 capsule (300 mg) by mouth 3 times daily (Patient not taking: Reported on 10/6/2020) 90 capsule 3     GLUCOSAMINE SULFATE PO Take 500 mg by mouth daily       Omega-3 Fatty Acids (FISH OIL) 435 MG CAPS        SUMAtriptan (IMITREX) 100 MG tablet Take 1 tablet (100 mg) by mouth at onset of headache for migraine May repeat in 2 hours prn: max 2/day (Patient not taking: Reported on 10/6/2020) 12 tablet 4     No facility-administered encounter medications on file as of 10/6/2020.              Review Of Systems  Skin: As above  Eyes: negative  Ears/Nose/Throat: negative  Respiratory: No shortness of breath, dyspnea on exertion, cough, or hemoptysis  Cardiovascular: negative  Gastrointestinal: negative  Genitourinary: negative  Musculoskeletal: negative  Neurologic: negative  Psychiatric: negative  Hematologic/Lymphatic/Immunologic: negative  Endocrine: negative      O:   NAD, WDWN, Alert & Oriented, Mood & Affect wnl, Vitals stable   Here today alone   BP (!) 151/92   Pulse 86   LMP 04/05/2012   SpO2 98%    General appearance normal   Vitals stable   Alert, oriented and in no acute distress      Following lymph nodes palpated: Occipital, Cervical, Supraclavicular no lad   L thigh, L arm, L temple, breast inflamed seborrheic keratosis    Tag on r breast        Stuck on papules and brown macules on trunk and ext   Red papules on trunk  Flesh colored papules on trunk     The remainder of the full exam was normal; the following areas were examined:  conjunctiva/lids, oral mucosa, neck, peripheral vascular system, abdomen, lymph nodes, digits/nails, eccrine and apocrine glands, scalp/hair, face, neck, chest, abdomen, buttocks, back, RUE, LUE, RLE,  LLE       Eyes: Conjunctivae/lids:Normal     ENT: Lips, buccal mucosa, tongue: normal    MSK:Normal    Cardiovascular: peripheral edema none    Pulm: Breathing Normal    Lymph Nodes: No Head and Neck Lymphadenopathy     Neuro/Psych: Orientation:Alert and Orientedx3 ; Mood/Affect:normal       A/P:  1. Seborrheic keratosis, lentigo, angioma, dermal nevus  2. Inflamed seborrheic keratosis x4  LN2:  Treated with LN2 for 5s for 1-2 cycles. Warned risks of blistering, pain, pigment change, scarring, and incomplete resolution.  Advised patient to return if lesions do not completely resolve.  Wound care sheet given.  3. Breast tag  LN2:  Treated with LN2 for 5s for 1-2 cycles. Warned risks of blistering, pain, pigment change, scarring, and incomplete resolution.  Advised patient to return if lesions do not completely resolve.  Wound care sheet given.    BENIGN LESIONS DISCUSSED WITH PATIENT:  I discussed the specifics of tumor, prognosis, and genetics of benign lesions.  I explained that treatment of these lesions would be purely cosmetic and not medically neccessary.  I discussed with patient different removal options including excision, cautery and /or laser.      Nature and genetics of benign skin lesions dicussed with patient.  Signs and Symptoms of skin cancer discussed with patient.  Patient encouraged to perform monthly skin exams.  UV precautions reviewed with patient.  Skin care regimen reviewed with patient: Eliminate harsh soaps, i.e. Dial, zest, irsih spring; Mild soaps such as Cetaphil or Dove sensitive skin, avoid hot or cold showers, aggressive use of emollients including vanicream, cetaphil or cerave discussed with patient.    Risks of non-melanoma skin cancer discussed with patient   Return to clinic 12 months

## 2020-10-07 ENCOUNTER — OFFICE VISIT (OUTPATIENT)
Dept: ORTHOPEDICS | Facility: CLINIC | Age: 55
End: 2020-10-07
Payer: COMMERCIAL

## 2020-10-07 ENCOUNTER — ANCILLARY PROCEDURE (OUTPATIENT)
Dept: GENERAL RADIOLOGY | Facility: CLINIC | Age: 55
End: 2020-10-07
Attending: PEDIATRICS
Payer: COMMERCIAL

## 2020-10-07 VITALS
HEIGHT: 61 IN | BODY MASS INDEX: 36.44 KG/M2 | DIASTOLIC BLOOD PRESSURE: 89 MMHG | WEIGHT: 193 LBS | SYSTOLIC BLOOD PRESSURE: 135 MMHG

## 2020-10-07 DIAGNOSIS — G89.29 CHRONIC PAIN OF LEFT KNEE: ICD-10-CM

## 2020-10-07 DIAGNOSIS — R20.0 NUMBNESS AND TINGLING IN BOTH HANDS: ICD-10-CM

## 2020-10-07 DIAGNOSIS — M25.531 PAIN IN BOTH WRISTS: Primary | ICD-10-CM

## 2020-10-07 DIAGNOSIS — M25.562 LEFT KNEE PAIN: ICD-10-CM

## 2020-10-07 DIAGNOSIS — M25.532 PAIN IN BOTH WRISTS: Primary | ICD-10-CM

## 2020-10-07 DIAGNOSIS — M25.532 PAIN IN BOTH WRISTS: ICD-10-CM

## 2020-10-07 DIAGNOSIS — Z98.890 S/P LEFT KNEE SURGERY: ICD-10-CM

## 2020-10-07 DIAGNOSIS — R20.2 NUMBNESS AND TINGLING IN BOTH HANDS: ICD-10-CM

## 2020-10-07 DIAGNOSIS — M25.531 PAIN IN BOTH WRISTS: ICD-10-CM

## 2020-10-07 DIAGNOSIS — M25.562 CHRONIC PAIN OF LEFT KNEE: ICD-10-CM

## 2020-10-07 DIAGNOSIS — M22.2X2 PATELLOFEMORAL PAIN SYNDROME OF LEFT KNEE: ICD-10-CM

## 2020-10-07 PROCEDURE — 73562 X-RAY EXAM OF KNEE 3: CPT | Performed by: RADIOLOGY

## 2020-10-07 PROCEDURE — 73110 X-RAY EXAM OF WRIST: CPT | Mod: LT | Performed by: RADIOLOGY

## 2020-10-07 PROCEDURE — 99203 OFFICE O/P NEW LOW 30 MIN: CPT | Performed by: PEDIATRICS

## 2020-10-07 ASSESSMENT — MIFFLIN-ST. JEOR: SCORE: 1417.82

## 2020-10-07 NOTE — PATIENT INSTRUCTIONS
We discussed these other possible diagnosis: carpal tunnel wrist, early arthritis knee    Plan:  - Today's Plan of Care:  Wrist  - Trial wrist braces overnight    Knee  - Trial of knee brace - patellar stabilizing  - Home Exercise Program - knee    -We also discussed other future treatment options:  Knee:  - Referral to Physical Therapy  - MRI images    Wrist:  - Referral to Hand Therapy  - EMG test    Follow Up: 6 - 8 weeks and as needed    If you have any further questions for your physician or physician s care team you can call [phone] and use option 3 to leave a voice message. Calls received during business hours will be returned same day.
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
## 2020-10-08 NOTE — RESULT ENCOUNTER NOTE
These results were discussed during office visit.    Snow Mcconnell MD, CAQ  Primary Care Sports Medicine  [facility]

## 2020-10-08 NOTE — RESULT ENCOUNTER NOTE
These results were discussed during office visit.    Snow Mcconnell MD, CAQ  Primary Care Sports Medicine  [facility]

## 2020-11-16 ENCOUNTER — HEALTH MAINTENANCE LETTER (OUTPATIENT)
Age: 55
End: 2020-11-16

## 2021-02-05 ENCOUNTER — APPOINTMENT (OUTPATIENT)
Dept: GENERAL RADIOLOGY | Facility: CLINIC | Age: 56
End: 2021-02-05
Attending: NURSE PRACTITIONER
Payer: COMMERCIAL

## 2021-02-05 ENCOUNTER — HOSPITAL ENCOUNTER (EMERGENCY)
Facility: CLINIC | Age: 56
Discharge: HOME | End: 2021-02-05
Attending: NURSE PRACTITIONER | Admitting: NURSE PRACTITIONER
Payer: COMMERCIAL

## 2021-02-05 VITALS
DIASTOLIC BLOOD PRESSURE: 91 MMHG | SYSTOLIC BLOOD PRESSURE: 159 MMHG | HEART RATE: 75 BPM | RESPIRATION RATE: 16 BRPM | OXYGEN SATURATION: 95 % | WEIGHT: 195 LBS | BODY MASS INDEX: 36.84 KG/M2 | TEMPERATURE: 98.5 F

## 2021-02-05 DIAGNOSIS — M54.9 BACK PAIN: ICD-10-CM

## 2021-02-05 DIAGNOSIS — B02.9 SHINGLES: ICD-10-CM

## 2021-02-05 PROCEDURE — G0463 HOSPITAL OUTPT CLINIC VISIT: HCPCS | Performed by: NURSE PRACTITIONER

## 2021-02-05 PROCEDURE — 71101 X-RAY EXAM UNILAT RIBS/CHEST: CPT | Mod: RT

## 2021-02-05 PROCEDURE — 99213 OFFICE O/P EST LOW 20 MIN: CPT | Performed by: NURSE PRACTITIONER

## 2021-02-05 RX ORDER — VALACYCLOVIR HYDROCHLORIDE 1 G/1
1000 TABLET, FILM COATED ORAL 3 TIMES DAILY
Qty: 21 TABLET | Refills: 0 | Status: SHIPPED | OUTPATIENT
Start: 2021-02-05 | End: 2021-06-30

## 2021-02-05 RX ORDER — HYDROCODONE BITARTRATE AND ACETAMINOPHEN 5; 325 MG/1; MG/1
1 TABLET ORAL EVERY 6 HOURS PRN
Qty: 8 TABLET | Refills: 0 | Status: SHIPPED | OUTPATIENT
Start: 2021-02-05 | End: 2021-04-19

## 2021-02-05 ASSESSMENT — ENCOUNTER SYMPTOMS
CONSTITUTIONAL NEGATIVE: 1
BACK PAIN: 1
NEUROLOGICAL NEGATIVE: 1
CARDIOVASCULAR NEGATIVE: 1
RESPIRATORY NEGATIVE: 1
GASTROINTESTINAL NEGATIVE: 1

## 2021-02-05 NOTE — ED TRIAGE NOTES
Pt states pain on right side of back that started on Tuesday was present when she awoke, mild at first gradually getting worse. Pt states pain worse with movement, bending twisting. Pain radiates down lateral side of right leg. Pt denies other s/s no n/v, urinary s/s, SOB with this. Taking ibuprofen and topical ointment with minimal relief.

## 2021-02-05 NOTE — ED PROVIDER NOTES
History     Chief Complaint   Patient presents with     Back Pain     HPI  Shae Pierre is a 55 year old female who presents to the urgent care for evaluation of back pain beginning 3 nights ago. Pain was initially quite mild at 1/10 but increased to 10/10 last night. Worse with movement, particularly twisting motions. She has no known injury or trauma. Has been using tylenol and ibuprofen with minimal relief. No recent illness. Denies fever, headache, dizziness, chest pain, shortness of breath, abdominal pain, nausea, vomiting and diarrhea.     Allergies:  Allergies   Allergen Reactions     Codeine Other (See Comments) and Difficulty breathing     Throat closes     Morphine Other (See Comments) and Difficulty breathing     Throat closes     Sulfa Drugs Hives     Staples Other (See Comments) and Rash     Rash and infection after knee surgery     Problem List:    Patient Active Problem List    Diagnosis Date Noted     Benign essential hypertension 04/18/2018     Priority: Medium     Diagnosed 4/18/2018. Recommend lifestyle changes.  Recheck 3-6 months, initiate medical therapy       Urge incontinence of urine 12/03/2015     Priority: Medium     Breast cyst 06/15/2012     Priority: Medium     Migraine headache 01/23/2012     Priority: Medium     CARDIOVASCULAR SCREENING; LDL GOAL LESS THAN 160 10/31/2010     Priority: Medium     Obesity 03/27/2006     Priority: Medium     Problem list name updated by automated process. Provider to review       Dermatophytosis of nail 11/03/2005     Priority: Medium      Past Medical History:    Past Medical History:   Diagnosis Date     Dysplasia of cervix (uteri) 1998     Fibroids 2/22/2012     Other abnormal Pap smear and cervical HPV (human papapillomavirus) 9/29/2008     Other acne      PONV (postoperative nausea and vomiting)      Past Surgical History:    Past Surgical History:   Procedure Laterality Date     COLONOSCOPY N/A 4/16/2018    Procedure: COLONOSCOPY;  Colonoscopy;   Surgeon: Chuck Donovan MD;  Location: [facility]     EXCISE MASS TRUNK  7/13/2012    Procedure: EXCISE MASS TRUNK;  Excisional Removal Chest Wall Cysts X2;  Surgeon: Dennys Henriquez MD;  Location: [facility]     HYSTERECTOMY, PAP NO LONGER INDICATED       LAPAROSCOPIC ASSISTED HYSTERECTOMY VAGINAL, BILATERAL SALPINGO-OOPHORECTOMY, COMBINED  4/5/2012    LAVH-BSO     SURGICAL HISTORY OF -       LEEP     SURGICAL HISTORY OF -       Cryotherapy      SURGICAL HISTORY OF -       Colpo     SURGICAL HISTORY OF -       left knee/patella surgery     Family History:    Family History   Problem Relation Age of Onset     Breast Cancer Maternal Grandmother      Hypertension Father      C.A.D. Father      Alzheimer Disease Father      Lipids Brother      Lipids Brother      Lipids Brother      Lipids Brother      Lipids Sister      Lipids Sister      Circulatory Mother         AAA     Chronic Obstructive Pulmonary Disease Mother      Breast Cancer Maternal Aunt      Social History:  Marital Status:   [2]  Social History     Tobacco Use     Smoking status: Former Smoker     Types: Cigarettes     Quit date: 4/1/2010     Years since quitting: 10.8     Smokeless tobacco: Never Used   Substance Use Topics     Alcohol use: Yes     Comment: occ     Drug use: No      Medications:         HYDROcodone-acetaminophen (NORCO) 5-325 MG tablet       valACYclovir (VALTREX) 1000 mg tablet       ADVIL 200 MG OR TABS       cyclobenzaprine (FLEXERIL) 5 MG tablet       estradiol (ESTRACE) 1 MG tablet       gabapentin (NEURONTIN) 300 MG capsule       GLUCOSAMINE SULFATE PO       MULTI-VITAMIN OR TABS       Omega-3 Fatty Acids (FISH OIL) 435 MG CAPS       SUMAtriptan (IMITREX) 100 MG tablet      Review of Systems   Constitutional: Negative.    Respiratory: Negative.    Cardiovascular: Negative.    Gastrointestinal: Negative.    Musculoskeletal: Positive for back pain.   Neurological: Negative.      Physical Exam   BP: (!) 159/91  Pulse: 75  Temp:  98.5  F (36.9  C)  Resp: 16  Weight: 88.5 kg (195 lb)  SpO2: 95 %    Physical Exam  Constitutional:       General: She is not in acute distress.     Appearance: She is well-developed. She is not diaphoretic.   HENT:      Head: Normocephalic.   Eyes:      Conjunctiva/sclera: Conjunctivae normal.      Pupils: Pupils are equal, round, and reactive to light.   Neck:      Musculoskeletal: Normal range of motion and neck supple.   Cardiovascular:      Rate and Rhythm: Normal rate and regular rhythm.      Pulses: Normal pulses.   Pulmonary:      Effort: Pulmonary effort is normal. No respiratory distress.      Breath sounds: Normal breath sounds and air entry. No decreased air movement. No decreased breath sounds, wheezing or rhonchi.   Abdominal:      General: There is no distension.      Palpations: Abdomen is soft.      Tenderness: There is no abdominal tenderness.   Musculoskeletal:        Back:       Comments: Area of reported pain. In the center of the painful area there is a singular vesicular lesion with surrounding erythema.    Skin:     General: Skin is warm.      Capillary Refill: Capillary refill takes less than 2 seconds.   Neurological:      General: No focal deficit present.      Mental Status: She is alert and oriented to person, place, and time.   Psychiatric:         Mood and Affect: Mood normal.       ED Course        Procedures    Results for orders placed or performed during the hospital encounter of 02/05/21 (from the past 24 hour(s))   Ribs XR, unilat 3 views + PA chest, right    Narrative    RIGHT RIBS AND CHEST THREE VIEWS   2/5/2021 2:47 PM     HISTORY: Right posterior lower rib pain.    COMPARISON: Chest x-ray from 4/16/2018.      Impression    IMPRESSION: Stable mildly prominent cardiac silhouette. Pulmonary  vasculature is normal. Lungs are clear. No pneumothorax or pleural  fluid. No evidence of rib fracture.       Medications - No data to display    Assessments & Plan (with Medical Decision  Making)   Shae Pierre is a 55 year old female who presents to the urgent care for evaluation of back pain beginning 3 nights ago. Pain was initially quite mild at 1/10 but increased to 10/10 last night. Worse with movement, particularly twisting motions. She has no known injury or trauma. Has been using tylenol and ibuprofen with minimal relief. Slightly hypertensive, all other vital signs normal. Patient is visibly uncomfortable throughout exam, declining need for pain medication at this time. Exam as above. Normal chest/rib xray. Low concern for PE. Solitary vesicular lesion at the center of the area of reported pain consistent with a beginning shingles rash. Will cover with valacyclovir. Norco as needed for severe pain. Ibuprofen as antiinflammatory. Return precautions reviewed, all questions answered. Patient is agreeable to plan of care and discharged in no acute distress.     I have reviewed the nursing notes.    I have reviewed the findings, diagnosis, plan and need for follow up with the patient.  New Prescriptions    HYDROCODONE-ACETAMINOPHEN (NORCO) 5-325 MG TABLET    Take 1 tablet by mouth every 6 hours as needed for pain    VALACYCLOVIR (VALTREX) 1000 MG TABLET    Take 1 tablet (1,000 mg) by mouth 3 times daily for 7 days     Final diagnoses:   Shingles   Back pain     2/5/2021   [facility] EMERGENCY DEPT     Luz Blackburn, STEFANIE CNP  02/05/21 [id number]

## 2021-02-05 NOTE — DISCHARGE INSTRUCTIONS
Ibuprofen 600 mg every 6 hours for 3 days   OR  Ibuprofen 800 mg every 8 hours for 3 days    In addition to prescribed Valacyclovir  Norco as needed for severe pain.   Return to the UC/ED for new or worsening symptoms  Follow up with primary care provider if symptoms persist.

## 2021-02-07 ENCOUNTER — HEALTH MAINTENANCE LETTER (OUTPATIENT)
Age: 56
End: 2021-02-07

## 2021-03-10 ENCOUNTER — OFFICE VISIT (OUTPATIENT)
Dept: FAMILY MEDICINE | Facility: CLINIC | Age: 56
End: 2021-03-10
Payer: COMMERCIAL

## 2021-03-10 VITALS
HEIGHT: 61 IN | SYSTOLIC BLOOD PRESSURE: 138 MMHG | OXYGEN SATURATION: 99 % | DIASTOLIC BLOOD PRESSURE: 74 MMHG | HEART RATE: 71 BPM | TEMPERATURE: 98.6 F | RESPIRATION RATE: 16 BRPM | WEIGHT: 190 LBS | BODY MASS INDEX: 35.87 KG/M2

## 2021-03-10 DIAGNOSIS — L02.213 CHEST WALL ABSCESS: Primary | ICD-10-CM

## 2021-03-10 PROCEDURE — 87070 CULTURE OTHR SPECIMN AEROBIC: CPT | Performed by: NURSE PRACTITIONER

## 2021-03-10 PROCEDURE — 10060 I&D ABSCESS SIMPLE/SINGLE: CPT | Performed by: NURSE PRACTITIONER

## 2021-03-10 PROCEDURE — 99213 OFFICE O/P EST LOW 20 MIN: CPT | Mod: 25 | Performed by: NURSE PRACTITIONER

## 2021-03-10 PROCEDURE — 87077 CULTURE AEROBIC IDENTIFY: CPT | Performed by: NURSE PRACTITIONER

## 2021-03-10 RX ORDER — DOXYCYCLINE 100 MG/1
100 CAPSULE ORAL 2 TIMES DAILY
Qty: 14 CAPSULE | Refills: 0 | Status: SHIPPED | OUTPATIENT
Start: 2021-03-10 | End: 2021-03-17

## 2021-03-10 ASSESSMENT — MIFFLIN-ST. JEOR: SCORE: 1404.21

## 2021-03-10 NOTE — PROGRESS NOTES
"    Assessment & Plan     Chest wall abscess  Incision and drainage performed, see procedure note. Did not tolerate probing well despite additional lidocaine well so no packing placed. Encouraged warm compresses to promote continued drainage. Given associated cellulitis, start doxycycline, wound culture obtained.  - Wound Culture Aerobic Bacterial  - doxycycline hyclate (VIBRAMYCIN) 100 MG capsule; Take 1 capsule (100 mg) by mouth 2 times daily for 7 days       BMI:   Estimated body mass index is 35.9 kg/m  as calculated from the following:    Height as of this encounter: 1.549 m (5' 1\").    Weight as of this encounter: 86.2 kg (190 lb).       Patient Instructions   Start doxycycline twice daily for one week.  Warm compresses/showers to promote continued drainage. If becoming larger, come back and see me Friday.  If fevers, worsening develop, be seen in urgent care/ER.      Return in about 2 days (around 3/12/2021) for Recheck of symptoms.    STEFANIE Nunez CNP  [facility]    Sorin Kaufman is a 55 year old who presents for the following health issues     HPI       Chief Complaint   Patient presents with     Derm Problem     Boil/cyst on chest area between breast , very red, round and raised x Saturday     Above HPI reviewed. Additionally, increases in size and became increasingly more painful over night. No drainage or fevers. Has had similar lesion in same place, about 4 years ago was last.        Review of Systems   Constitutional, HEENT, cardiovascular, pulmonary, gi and gu systems are negative, except as otherwise noted.      Objective    /74 (BP Location: Right arm, Patient Position: Sitting, Cuff Size: Adult Large)   Pulse 71   Temp 98.6  F (37  C) (Tympanic)   Resp 16   Ht 1.549 m (5' 1\")   Wt 86.2 kg (190 lb)   LMP 04/05/2012   SpO2 99%   Breastfeeding No   BMI 35.90 kg/m    Body mass index is 35.9 kg/m .  Physical Exam  Vitals signs and nursing note " reviewed.   Constitutional:       General: She is not in acute distress.     Appearance: Normal appearance.   HENT:      Head: Normocephalic and atraumatic.      Mouth/Throat:      Mouth: Mucous membranes are moist.   Neck:      Musculoskeletal: Neck supple.   Cardiovascular:      Rate and Rhythm: Normal rate.   Pulmonary:      Effort: Pulmonary effort is normal.   Skin:     General: Skin is warm and dry.      Comments: 4cm fluctuant mass to anterior chest wall adjacent to left breast with approximately 3cm surrounding erythema.   Neurological:      General: No focal deficit present.      Mental Status: She is alert.   Psychiatric:         Mood and Affect: Mood normal.         Behavior: Behavior normal.        PROCEDURE: Incision and Drainage   INDICATIONS: Localized abscess   PROCEDURE PROVIDER: STEFANIE Nunez CNP     SITE: Left anterior chest wall   MEDICATION:  Lidocaine 1% plain Injecting 3 mls.   NOTE: The area was prepped with betadine and draped off in the usual sterile fashion.  Local anesthetic was injected subcutaneously with anesthesia effects demonstrated prior to proceeding.  The area of maximal fluctuance was opened with a 11 blade using horizontal incision to allow for drainage.  The abscess was drained.  The abscess cavity was bluntly explored to separate any loculations.  No packing was placed into the abscess cavity.  A sterile dressing was placed over the area.   COMPLEXITY: Simple    Simple = single, furuncle, paronychia, superficial  Complex = multiple or abscess requiring probing, loculations, packing placement   COMPLICATIONS:  None

## 2021-03-10 NOTE — PATIENT INSTRUCTIONS
Start doxycycline twice daily for one week.  Warm compresses/showers to promote continued drainage. If becoming larger, come back and see me Friday.  If fevers, worsening develop, be seen in urgent care/ER.

## 2021-03-12 LAB
BACTERIA SPEC CULT: ABNORMAL
Lab: ABNORMAL
SPECIMEN SOURCE: ABNORMAL

## 2021-04-03 ENCOUNTER — HEALTH MAINTENANCE LETTER (OUTPATIENT)
Age: 56
End: 2021-04-03

## 2021-04-09 ENCOUNTER — OFFICE VISIT (OUTPATIENT)
Dept: ORTHOPEDICS | Facility: CLINIC | Age: 56
End: 2021-04-09
Payer: COMMERCIAL

## 2021-04-09 VITALS
DIASTOLIC BLOOD PRESSURE: 85 MMHG | HEIGHT: 61 IN | WEIGHT: 195 LBS | BODY MASS INDEX: 36.82 KG/M2 | SYSTOLIC BLOOD PRESSURE: 134 MMHG

## 2021-04-09 DIAGNOSIS — R20.2 NUMBNESS AND TINGLING IN BOTH HANDS: ICD-10-CM

## 2021-04-09 DIAGNOSIS — M25.532 PAIN IN BOTH WRISTS: ICD-10-CM

## 2021-04-09 DIAGNOSIS — M25.531 PAIN IN BOTH WRISTS: ICD-10-CM

## 2021-04-09 DIAGNOSIS — Z98.890 S/P LEFT KNEE SURGERY: ICD-10-CM

## 2021-04-09 DIAGNOSIS — R20.0 NUMBNESS AND TINGLING IN BOTH HANDS: ICD-10-CM

## 2021-04-09 DIAGNOSIS — G89.29 CHRONIC PAIN OF LEFT KNEE: Primary | ICD-10-CM

## 2021-04-09 DIAGNOSIS — M25.562 CHRONIC PAIN OF LEFT KNEE: Primary | ICD-10-CM

## 2021-04-09 PROCEDURE — 99214 OFFICE O/P EST MOD 30 MIN: CPT | Performed by: PEDIATRICS

## 2021-04-09 ASSESSMENT — MIFFLIN-ST. JEOR: SCORE: 1421.89

## 2021-04-09 NOTE — PROGRESS NOTES
Sports Medicine Clinic Visit - Interim History April 9, 2021    PCP: No Ref-Primary, Physician    Shae SHELIA Ignacio is a 56 year old female who is seen in f/u up for    Chronic pain of left knee  S/P left knee surgery  Numbness and tingling in both hands  Pain in both wrists   Since last visit on 10/7/2020 patient is having a horrible pain in her left knee and left hand.    1) Left Knee: Patient does not remember doing anything specifically with her left knee, insidious onset.  Patient cannot sleep, walking is painful. Pain is on the medial aspect of the knee primarily the jointline.  She also notices swelling in the medial aspect of the leg. Clicking and grinding, no weakness or feeling like it will give out.    Symptoms are better with: Other medications: advil, icing, rest, knee brace does not help.   Symptoms are worse with: extension, flexion, sitting, standing and stairs  Additional Features:   Positive: swelling, popping, grinding, numbness and weakness    2) Left wrist > right wrist is still a concern. Patient continues to have ulnar sided pain and numbness tingling. She wears her brace at night time. Patient is using biofreeze, icyhot.  Wrist is most bothered by typing, and other movement.      Social History: , computer work    Review of Systems  Skin: no bruising, yes knee swelling  Musculoskeletal: as above  Neurologic: some hand numbness, paresthesias  Remainder of review of systems is negative including constitutional, CV, pulmonary, GI, except as noted in HPI or medical history.    Patient's current problem list, past medical and surgical history, and family history were reviewed.    Patient Active Problem List   Diagnosis     Dermatophytosis of nail     Obesity     CARDIOVASCULAR SCREENING; LDL GOAL LESS THAN 160     Migraine headache     Breast cyst     Urge incontinence of urine     Benign essential hypertension     Chronic pain of left knee     Past Medical History:   Diagnosis Date  "    Dysplasia of cervix (uteri) 1998     Fibroids 2/22/2012     Other abnormal Pap smear and cervical HPV (human papapillomavirus) 9/29/2008    EM>40 and pos HPV unknown type rec rpt pap 6 mos/11/08  1/26/2009 F/u pap was normal  Will have next pap in 1 year.      Other acne      PONV (postoperative nausea and vomiting)      Past Surgical History:   Procedure Laterality Date     COLONOSCOPY N/A 4/16/2018    Procedure: COLONOSCOPY;  Colonoscopy;  Surgeon: Chuck Donovan MD;  Location: [facility]     EXCISE MASS TRUNK  7/13/2012    Procedure: EXCISE MASS TRUNK;  Excisional Removal Chest Wall Cysts X2;  Surgeon: Dennys Henriquez MD;  Location: [facility]     HYSTERECTOMY, PAP NO LONGER INDICATED       LAPAROSCOPIC ASSISTED HYSTERECTOMY VAGINAL, BILATERAL SALPINGO-OOPHORECTOMY, COMBINED  4/5/2012    LAVH-BSO     SURGICAL HISTORY OF -       LEEP     SURGICAL HISTORY OF -       Cryotherapy      SURGICAL HISTORY OF -       Colpo     SURGICAL HISTORY OF -       left knee/patella surgery     Family History   Problem Relation Age of Onset     Breast Cancer Maternal Grandmother      Hypertension Father      C.A.D. Father      Alzheimer Disease Father      Lipids Brother      Lipids Brother      Lipids Brother      Lipids Brother      Lipids Sister      Lipids Sister      Circulatory Mother         AAA     Chronic Obstructive Pulmonary Disease Mother      Breast Cancer Maternal Aunt        Objective  /85   Ht 1.549 m (5' 1\")   Wt 88.5 kg (195 lb)   LMP 04/05/2012   BMI 36.84 kg/m      GENERAL APPEARANCE: healthy, alert and no distress   GAIT: NORMAL  SKIN: no suspicious lesions or rashes  HEENT: Sclera clear, anicteric  CV: good peripheral pulses  RESP: Breathing not labored  NEURO: Normal strength and tone, mentation intact and speech normal  PSYCH:  mentation appears normal and affect normal/bright     Bilateral Knee exam  Inspection:      mild effusion and swelling left  - well healed scars, prominent tibial " tubercle     Patella:      Crepitus noted in the patellofemoral joint left     Tender:      medial patellar border, medial joint line     Non Tender:      remainder of knee area bilateral     Knee ROM:      Full active and passive ROM with flexion and extension bilateral     Strength:      5/5 with knee extension bilateral     Special Tests:    positive (+) Alan left       neg (-) anterior drawer left       neg (-) posterior drawer left       neg (-) varus at 0 deg and 30 deg left       neg (-) valgus at 0 deg and 30 deg left     Gait:      normal     Neurovascular:      2+ peripheral pulses bilaterally and brisk capillary refill       sensation grossly intact    Bilateral Wrist and Hand exam  Inspection:       No swelling, bruising or deformity bilateral    Tender:       Volar wrist    Non Tender:       Remainder of the Wrist and Hand bilateral    ROM:       Full and symmetric active and passive range of motion of the forearm, wrist and digits bilateral    Strength:       5/5 strength in the muscles of the hand, wrist and forearm bilateral    Special Tests:        positive (+) Tinel's test bilateral and       positive (+) Phalen's test bilateral    Neurovascular:       2+ radial pulses bilaterally with brisk capillary refill and      normal sensation to light touch in the radial, median and ulnar nerve distributions    Radiology  I visualized and reviewed these images with the patient  AP and sunrise bilateral and left lateral knee XR 10/7/2020 reviewed: prior tibial tubercle transfer, normal joint spaces    3 XR views of bilateral wrists 10/7/2020 reviewed: no acute bony abnormality, no significant degenerative change      Assessment:  1. Chronic pain of left knee    2. S/P left knee surgery    3. Numbness and tingling in both hands    4. Pain in both wrists      We discussed these other possible diagnosis:  - Carpal tunnel wrists    Discussed anatomy and pathophysiology of carpal tunnel. Discussed avoidance  of exacerbating activities and use of braces.  Given persistent symptoms, will obtain MRI. Would consider referral pending course with treatment.    - Knee bone stress injury, meniscal tear, aggravation of underlying arthritis  Given severe pain, will obtain MRI to evaluate. Discussed supportive care in the interim.    Plan:  - Today's Plan of Care:  Knee  - Call [phone] to schedule MRI  - Continue with relative rest and activity modification, Ice, Compression, and Elevation.  Can apply ice 10-15 minutes 3-4 times per day as needed.  - Home Exercise Program - range of motion, quad sets, straight leg raises    Wrist  - EMG bilateral wrist, someone will call you to schedule    -We also discussed other future treatment options:  Knee - physical therapy, consideration of corticosteroid injection   Wrist - referral to occupational therapy, consideration of corticosteroid injection, hand surgery referral    Follow Up: In clinic with Dr. Mcconnell after MRI (wait at least 1-2 days)    Concerning signs and symptoms were reviewed.  The patient expressed understanding of this management plan and all questions were answered at this time.    Snow Mcconnell MD [facility]  Sports Medicine Physician  [facility]

## 2021-04-09 NOTE — LETTER
4/9/2021         RE: Shae Pierre  [address] [place] [place] [address]        Dear Colleague,    Thank you for referring your patient, Shae Pierre, to the [facility] [place]. Please see a copy of my visit note below.    Sports Medicine Clinic Visit - Interim History April 9, 2021    PCP: No Ref-Primary, Physician    Shae Pierre is a 56 year old female who is seen in f/u up for    Chronic pain of left knee  S/P left knee surgery  Numbness and tingling in both hands  Pain in both wrists   Since last visit on 10/7/2020 patient is having a horrible pain in her left knee and left hand.    1) Left Knee: Patient does not remember doing anything specifically with her left knee, insidious onset.  Patient cannot sleep, walking is painful. Pain is on the medial aspect of the knee primarily the jointline.  She also notices swelling in the medial aspect of the leg. Clicking and grinding, no weakness or feeling like it will give out.    Symptoms are better with: Other medications: advil, icing, rest, knee brace does not help.   Symptoms are worse with: extension, flexion, sitting, standing and stairs  Additional Features:   Positive: swelling, popping, grinding, numbness and weakness    2) Left wrist > right wrist is still a concern. Patient continues to have ulnar sided pain and numbness tingling. She wears her brace at night time. Patient is using biofreeze, icyhot.  Wrist is most bothered by typing, and other movement.      Social History: , computer work    Review of Systems  Skin: no bruising, yes knee swelling  Musculoskeletal: as above  Neurologic: some hand numbness, paresthesias  Remainder of review of systems is negative including constitutional, CV, pulmonary, GI, except as noted in HPI or medical history.    Patient's current problem list, past medical and surgical history, and family history were reviewed.    Patient Active Problem List   Diagnosis      "Dermatophytosis of nail     Obesity     CARDIOVASCULAR SCREENING; LDL GOAL LESS THAN 160     Migraine headache     Breast cyst     Urge incontinence of urine     Benign essential hypertension     Chronic pain of left knee     Past Medical History:   Diagnosis Date     Dysplasia of cervix (uteri) 1998     Fibroids 2/22/2012     Other abnormal Pap smear and cervical HPV (human papapillomavirus) 9/29/2008    EM>40 and pos HPV unknown type rec rpt pap 6 mos/11/08  1/26/2009 F/u pap was normal  Will have next pap in 1 year.      Other acne      PONV (postoperative nausea and vomiting)      Past Surgical History:   Procedure Laterality Date     COLONOSCOPY N/A 4/16/2018    Procedure: COLONOSCOPY;  Colonoscopy;  Surgeon: Chuck Donovan MD;  Location: [facility]     EXCISE MASS TRUNK  7/13/2012    Procedure: EXCISE MASS TRUNK;  Excisional Removal Chest Wall Cysts X2;  Surgeon: Dennys Henriquez MD;  Location: [facility]     HYSTERECTOMY, PAP NO LONGER INDICATED       LAPAROSCOPIC ASSISTED HYSTERECTOMY VAGINAL, BILATERAL SALPINGO-OOPHORECTOMY, COMBINED  4/5/2012    LAVH-BSO     SURGICAL HISTORY OF -       LEEP     SURGICAL HISTORY OF -       Cryotherapy      SURGICAL HISTORY OF -       Colpo     SURGICAL HISTORY OF -       left knee/patella surgery     Family History   Problem Relation Age of Onset     Breast Cancer Maternal Grandmother      Hypertension Father      C.A.D. Father      Alzheimer Disease Father      Lipids Brother      Lipids Brother      Lipids Brother      Lipids Brother      Lipids Sister      Lipids Sister      Circulatory Mother         AAA     Chronic Obstructive Pulmonary Disease Mother      Breast Cancer Maternal Aunt        Objective  /85   Ht 1.549 m (5' 1\")   Wt 88.5 kg (195 lb)   LMP 04/05/2012   BMI 36.84 kg/m      GENERAL APPEARANCE: healthy, alert and no distress   GAIT: NORMAL  SKIN: no suspicious lesions or rashes  HEENT: Sclera clear, anicteric  CV: good peripheral pulses  RESP: " Breathing not labored  NEURO: Normal strength and tone, mentation intact and speech normal  PSYCH:  mentation appears normal and affect normal/bright     Bilateral Knee exam  Inspection:      mild effusion and swelling left  - well healed scars, prominent tibial tubercle     Patella:      Crepitus noted in the patellofemoral joint left     Tender:      medial patellar border, medial joint line     Non Tender:      remainder of knee area bilateral     Knee ROM:      Full active and passive ROM with flexion and extension bilateral     Strength:      5/5 with knee extension bilateral     Special Tests:    positive (+) Alan left       neg (-) anterior drawer left       neg (-) posterior drawer left       neg (-) varus at 0 deg and 30 deg left       neg (-) valgus at 0 deg and 30 deg left     Gait:      normal     Neurovascular:      2+ peripheral pulses bilaterally and brisk capillary refill       sensation grossly intact    Bilateral Wrist and Hand exam  Inspection:       No swelling, bruising or deformity bilateral    Tender:       Volar wrist    Non Tender:       Remainder of the Wrist and Hand bilateral    ROM:       Full and symmetric active and passive range of motion of the forearm, wrist and digits bilateral    Strength:       5/5 strength in the muscles of the hand, wrist and forearm bilateral    Special Tests:        positive (+) Tinel's test bilateral and       positive (+) Phalen's test bilateral    Neurovascular:       2+ radial pulses bilaterally with brisk capillary refill and      normal sensation to light touch in the radial, median and ulnar nerve distributions    Radiology  I visualized and reviewed these images with the patient  AP and sunrise bilateral and left lateral knee XR 10/7/2020 reviewed: prior tibial tubercle transfer, normal joint spaces    3 XR views of bilateral wrists 10/7/2020 reviewed: no acute bony abnormality, no significant degenerative change      Assessment:  1. Chronic pain  of left knee    2. S/P left knee surgery    3. Numbness and tingling in both hands    4. Pain in both wrists      We discussed these other possible diagnosis:  - Carpal tunnel wrists    Discussed anatomy and pathophysiology of carpal tunnel. Discussed avoidance of exacerbating activities and use of braces.  Given persistent symptoms, will obtain MRI. Would consider referral pending course with treatment.    - Knee bone stress injury, meniscal tear, aggravation of underlying arthritis  Given severe pain, will obtain MRI to evaluate. Discussed supportive care in the interim.    Plan:  - Today's Plan of Care:  Knee  - Call [phone] to schedule MRI  - Continue with relative rest and activity modification, Ice, Compression, and Elevation.  Can apply ice 10-15 minutes 3-4 times per day as needed.  - Home Exercise Program - range of motion, quad sets, straight leg raises    Wrist  - EMG bilateral wrist, someone will call you to schedule    -We also discussed other future treatment options:  Knee - physical therapy, consideration of corticosteroid injection   Wrist - referral to occupational therapy, consideration of corticosteroid injection, hand surgery referral    Follow Up: In clinic with Dr. Mcconnell after MRI (wait at least 1-2 days)    Concerning signs and symptoms were reviewed.  The patient expressed understanding of this management plan and all questions were answered at this time.    Snow Mcconnell MD [facility]  Sports Medicine Physician  [facility]        Again, thank you for allowing me to participate in the care of your patient.        Sincerely,        Snow Mcconnell MD

## 2021-04-09 NOTE — PATIENT INSTRUCTIONS
We discussed these other possible diagnosis:  - Carpal tunnel wrists  - Knee bone stress injury, meniscal tear, aggravation of underlying arthritis    Plan:  - Today's Plan of Care:  Knee  - Call [phone] to schedule MRI  - Continue with relative rest and activity modification, Ice, Compression, and Elevation.  Can apply ice 10-15 minutes 3-4 times per day as needed.  - Home Exercise Program - range of motion, quad sets, straight leg raises    Wrist  - EMG bilateral wrist, someone will call you to schedule    -We also discussed other future treatment options:  Knee - physical therapy, consideration of corticosteroid injection   Wrist - referral to occupational therapy, consideration of corticosteroid injection, hand surgery referral    Follow Up: In clinic with Dr. Mcconnell after MRI (wait at least 1-2 days)    If you have any further questions for your physician or physician s care team you can call [phone] and use option 3 to leave a voice message. Calls received during business hours will be returned same day.

## 2021-04-11 ENCOUNTER — HOSPITAL ENCOUNTER (OUTPATIENT)
Dept: MRI IMAGING | Facility: CLINIC | Age: 56
Discharge: HOME | End: 2021-04-11
Attending: PEDIATRICS | Admitting: PEDIATRICS
Payer: COMMERCIAL

## 2021-04-11 DIAGNOSIS — M25.562 CHRONIC PAIN OF LEFT KNEE: ICD-10-CM

## 2021-04-11 DIAGNOSIS — Z98.890 S/P LEFT KNEE SURGERY: ICD-10-CM

## 2021-04-11 DIAGNOSIS — G89.29 CHRONIC PAIN OF LEFT KNEE: ICD-10-CM

## 2021-04-11 PROCEDURE — 73721 MRI JNT OF LWR EXTRE W/O DYE: CPT | Mod: LT

## 2021-04-11 PROCEDURE — 73721 MRI JNT OF LWR EXTRE W/O DYE: CPT | Mod: 26 | Performed by: RADIOLOGY

## 2021-04-12 ENCOUNTER — TELEPHONE (OUTPATIENT)
Dept: ORTHOPEDICS | Facility: CLINIC | Age: 56
End: 2021-04-12

## 2021-04-12 DIAGNOSIS — S83.249A MEDIAL MENISCUS TEAR: Primary | ICD-10-CM

## 2021-04-12 NOTE — TELEPHONE ENCOUNTER
MR left knee without contrast 4/11/2021 10:06 AM     Techniques: Multiplanar multisequence imaging of the left knee was   obtained without administration of intra-articular or intravenous   contrast using routing protocol.     History: Chronic pain of left knee; Chronic pain of left knee; S/P   left knee surgery     Comparison: 10/7/2020     Findings:     MENISCI:   Medial meniscus: Finding highly suspicious for Parrot beak type radial   tear posterior horn body junction of the medial meniscus. Please   correlate with previous operative report.   Lateral meniscus: Truncation of the body and posterior horn of the   lateral meniscus without reaching to the fluid signal intensity,   possibly related to prior partial meniscectomy.     LIGAMENTS   Cruciate ligaments: Intact.   Medial supporting structures: High-grade sprain with possible partial   tear of the posterior oblique ligament with associated focal regional   synovitis. Small pes anserine bursa fluid with associated synovial   proliferation, consistent with bursitis. Trace superficial edema along   the tibial collateral ligament, likely reactive to adjacent posterior   oblique ligament pathology.   Lateral supporting structures: Intact. Popliteus tendinosis.     EXTENSOR MECHANISM   Intact. Patellar enthesopathy. Postsurgical change at the tibial   tuberosity with bulky bony proliferation. Proximal patellar   tendinosis.     FLUID   No joint effusion. No substantial Baker's cyst.     OSSEOUS and ARTICULAR STRUCTURES   Bones: No fracture, contusion. Chondroid matrix appearing lesion in   the proximal tibia, likely enchondroma. Osteophytosis.     Patellofemoral compartment: High-grade chondral fissuring in the   lateral patellar facet with question subtle delamination component.   Moderate chondral loss of the central trochlear articular cartilage.     Medial compartment: No high-grade hyaline cartilage disease.     Lateral compartment: No high-grade hyaline  cartilage disease.     ANCILLARY FINDINGS   None.    Impression:     Impression:   1. Finding highly suspicious for Parrot beak type radial tear   posterior horn body junction of the medial meniscus. Please correlate   with previous operative report.   2. Truncation of the body and posterior horn of the lateral meniscus   without reaching to the fluid signal intensity, possibly related to   prior partial meniscectomy; otherwise, consistent with tear. Please   correlate with previous operative report.   3. High-grade sprain with possible partial tear of the posterior   oblique ligament with associated focal regional synovitis and pes   anserine bursitis.   4. 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     AZAR SEYMOUR

## 2021-04-12 NOTE — TELEPHONE ENCOUNTER
Resulted were sent via [employer].  Could consider orthopedic surgery referral given meniscal tear. Other options include physical therapy.  Plan per last visit was follow up after MRI and EMG.  Can schedule follow up appointment if desired.    Snow Mcconnell MD

## 2021-04-12 NOTE — TELEPHONE ENCOUNTER
Spoke to patient, she would like a referral to see Dr. Rodríguez with [facility].    Asked for phone number to schedule EMG since she hasn't heard from anyone.     Inquired if she was doing any more damage to the knee if she didn't use crutches.     Surgical referral placed, phone number given, and advise she should be ok - crutches are generally more for comfort and to help with irritation/inflammation.     Had no further questions and will work on getting appointments.    Cherise Gomez ATC

## 2021-04-19 ENCOUNTER — OFFICE VISIT (OUTPATIENT)
Dept: FAMILY MEDICINE | Facility: CLINIC | Age: 56
End: 2021-04-19
Payer: COMMERCIAL

## 2021-04-19 VITALS
TEMPERATURE: 96.1 F | OXYGEN SATURATION: 98 % | WEIGHT: 189 LBS | SYSTOLIC BLOOD PRESSURE: 128 MMHG | HEIGHT: 61 IN | BODY MASS INDEX: 35.68 KG/M2 | DIASTOLIC BLOOD PRESSURE: 88 MMHG | HEART RATE: 67 BPM | RESPIRATION RATE: 16 BRPM

## 2021-04-19 DIAGNOSIS — E66.01 MORBID OBESITY (H): ICD-10-CM

## 2021-04-19 DIAGNOSIS — S83.242A OTHER TEAR OF MEDIAL MENISCUS OF LEFT KNEE AS CURRENT INJURY, INITIAL ENCOUNTER: ICD-10-CM

## 2021-04-19 DIAGNOSIS — I10 BENIGN ESSENTIAL HYPERTENSION: ICD-10-CM

## 2021-04-19 DIAGNOSIS — Z01.818 PREOP GENERAL PHYSICAL EXAM: Primary | ICD-10-CM

## 2021-04-19 DIAGNOSIS — Z13.220 SCREENING FOR HYPERLIPIDEMIA: ICD-10-CM

## 2021-04-19 LAB
ANION GAP SERPL CALCULATED.3IONS-SCNC: 4 MMOL/L (ref 3–14)
BUN SERPL-MCNC: 9 MG/DL (ref 7–30)
CALCIUM SERPL-MCNC: 9.1 MG/DL (ref 8.5–10.1)
CHLORIDE SERPL-SCNC: 103 MMOL/L (ref 94–109)
CHOLEST SERPL-MCNC: 237 MG/DL
CO2 SERPL-SCNC: 29 MMOL/L (ref 20–32)
CREAT SERPL-MCNC: 0.66 MG/DL (ref 0.52–1.04)
ERYTHROCYTE [DISTWIDTH] IN BLOOD BY AUTOMATED COUNT: 13.2 % (ref 10–15)
GFR SERPL CREATININE-BSD FRML MDRD: >90 ML/MIN/{1.73_M2}
GLUCOSE SERPL-MCNC: 92 MG/DL (ref 70–99)
HCT VFR BLD AUTO: 43 % (ref 35–47)
HDLC SERPL-MCNC: 67 MG/DL
HGB BLD-MCNC: 14.1 G/DL (ref 11.7–15.7)
LDLC SERPL CALC-MCNC: 126 MG/DL
MCH RBC QN AUTO: 31.5 PG (ref 26.5–33)
MCHC RBC AUTO-ENTMCNC: 32.8 G/DL (ref 31.5–36.5)
MCV RBC AUTO: 96 FL (ref 78–100)
MRSA DNA SPEC QL NAA+PROBE: NEGATIVE
NONHDLC SERPL-MCNC: 170 MG/DL
PLATELET # BLD AUTO: 357 10E9/L (ref 150–450)
POTASSIUM SERPL-SCNC: 3.6 MMOL/L (ref 3.4–5.3)
RBC # BLD AUTO: 4.47 10E12/L (ref 3.8–5.2)
SODIUM SERPL-SCNC: 136 MMOL/L (ref 133–144)
SPECIMEN SOURCE: NORMAL
TRIGL SERPL-MCNC: 222 MG/DL
WBC # BLD AUTO: 10.7 10E9/L (ref 4–11)

## 2021-04-19 PROCEDURE — 87640 STAPH A DNA AMP PROBE: CPT | Performed by: NURSE PRACTITIONER

## 2021-04-19 PROCEDURE — 36415 COLL VENOUS BLD VENIPUNCTURE: CPT | Performed by: NURSE PRACTITIONER

## 2021-04-19 PROCEDURE — 87641 MR-STAPH DNA AMP PROBE: CPT | Performed by: NURSE PRACTITIONER

## 2021-04-19 PROCEDURE — 80048 BASIC METABOLIC PNL TOTAL CA: CPT | Performed by: NURSE PRACTITIONER

## 2021-04-19 PROCEDURE — 85027 COMPLETE CBC AUTOMATED: CPT | Performed by: NURSE PRACTITIONER

## 2021-04-19 PROCEDURE — 80061 LIPID PANEL: CPT | Performed by: NURSE PRACTITIONER

## 2021-04-19 PROCEDURE — 99214 OFFICE O/P EST MOD 30 MIN: CPT | Performed by: NURSE PRACTITIONER

## 2021-04-19 ASSESSMENT — MIFFLIN-ST. JEOR: SCORE: 1394.68

## 2021-04-19 NOTE — PATIENT INSTRUCTIONS
Preparing for Your Surgery  Getting started  A nurse will call you to review your health history and instructions. They will give you an arrival time based on your scheduled surgery time.  Please be ready to share the following:    Your doctor's clinic name and phone number    Your medical, surgical and anesthesia history    A list of allergies and sensitivities    A list of medicines, including herbal treatments and over-the-counter drugs    Whether the patient has a legal guardian (ask how to send us the papers in advance)  If you have a child who's having surgery, please ask for a copy of Preparing for Your Child's Surgery.    Preparing for surgery    Within 30 days of surgery: Have a pre-op exam (sometimes called an H&P, or History and Physical). This can be done at a clinic or pre-operative center.  ? If you're having a , you may not need this exam. Talk to your care team    At your pre-op exam, talk to your care team about all medicines you take. If you need to stop any medicines before surgery, ask when to start taking them again.  ? We do this for your safety. Many medicines can make you bleed too much during surgery. Some change how well surgery (anesthesia) drugs work.    Call your insurance company to let them know you're having surgery. (If you don't have insurance, call [phone].)    Call your clinic if there's any change in your health. This includes signs of a cold or flu (sore throat, runny nose, cough, rash, fever). It also includes a scrape or scratch near the surgery site.    If you have questions on the day of surgery, call your hospital or surgery center.  Eating and drinking guidelines  For your safety: Unless your surgeon tells you otherwise, follow the guidelines below.    Eat and drink as usual until 8 hours before surgery. After that, no food or milk.    Drink clear liquids until 2 hours before surgery. These are liquids you can see through, like water, Gatorade and Propel  Water. You may also have black coffee and tea (no cream or milk).    Nothing by mouth within 2 hours of surgery. This includes gum, candy and breath mints.    If you drink, stop drinking alcohol the night before surgery.    If your care team tells you to take medicine on the morning of surgery, it's okay to take it with a sip of water.  Preventing infection    Shower or bathe the night before and morning of your surgery. Follow the instructions your clinic gave you. (If no instructions, use regular soap.)    Don't shave or clip hair near your surgery site. We'll remove the hair if needed.    Don't smoke or vape the morning of surgery. You may chew nicotine gum up to 2 hours before surgery. A nicotine patch is okay.  ? Note: Some surgeries require you to completely quit smoking and nicotine. Check with your surgeon.    Your care team will make every effort to keep you safe from infection. We will:  ? Clean our hands often with soap and water (or an alcohol-based hand rub).  ? Clean the skin at your surgery site with a special soap that kills germs.  ? Give you a special gown to keep you warm. (Cold raises the risk of infection.)  ? Wear special hair covers, masks, gowns and gloves during surgery.  ? Give antibiotic medicine, if prescribed. Not all surgeries need antibiotics.  What to bring on the day of surgery    Photo ID and insurance card    Copy of your health care directive, if you have one    Glasses and hearing aides (bring cases)  ? You can't wear contacts during surgery    Inhaler and eye drops, if you use them (tell us about these when you arrive)    CPAP machine or breathing device, if you use them    A few personal items, if spending the night    If you have . . .  ? A pacemaker or ICD (cardiac defibrillator): Bring the ID card.  ? An implanted stimulator: Bring the remote control.  ? A legal guardian: Bring a copy of the certified (court-stamped) guardianship papers.  Please remove any jewelry, including  body piercings. Leave jewelry and other valuables at home.  If you're going home the day of surgery  Important: If you don't follow the rules below, we must cancel your surgery.     Arrange for someone to drive you home after surgery. You may not drive, take a taxi or take public transportation by yourself (unless you'll have local anesthesia only).    Arrange for a responsible adult to stay with you overnight. If you don't, we may keep you in the hospital overnight, and you may need to pay the costs yourself.  Questions?   If you have any questions for your care team, list them here: _________________________________________________________________________________________________________________________________________________________________________________________________________________________________________________________________________________________________________________________  For informational purposes only. Not to replace the advice of your health care provider. Copyright   2003, 2019 [employer]. All rights reserved. Clinically reviewed by Lashell Gaston MD. SMARTworks 791064 - REV 4/20.    Before Your Procedure or Hospital Admission  Testing for COVID-19 (Coronavirus)  Thank you for choosing [facility] for your health care needs. This is a very challenging time for everyone. The World Health Organization and the [facility] have declared the COVID-19 virus a pandemic.   Our goal is to keep you and our team here at [facility] safe and healthy. We've taken several steps to make this happen. For example:    We screen our staff, care teams and patients for COVID-19.    Everyone at [facility] must wear a mask and stay 6 feet apart.    We are limiting hospital and clinic visitors.  Before you come in  All patients must get tested for COVID-19. Your test needs to happen 2 to 4 days before you check in to the hospital or surgery site.   A clinic scheduler will call  "about a week in advance to set up a testing time at one of our labs where we'll take a swab of your nose or throat.  Note: If you go to a clinic or pharmacy like [facility] or [facility] for your test, make sure it's a \"RT-PCR\" test, not a \"rapid\" COVID-19 test. (See Questions and Answers below.)  After the test, please stay at home and stay out of contact with other people. It will be harder for you to recover if you get COVID-19 before your treatment.  Please follow all current safety guidelines, including:    Limit trips outside your home.    Limit the number of people you see.    Always wear a mask outside your home.    Use social distancing. (Stay 6 feet away from others whenever you can.)    Wash your hands often.  If your test shows you have COVID-19  If your test is positive, we'll let you know. A positive test means that you have the virus.   We'll probably have to postpone your admission, surgery or procedure. Your doctor will discuss this with you. After that, we'll let you know what to do and when you can reschedule.   We may need to cancel your treatment on short notice for other reasons, too.  If your test shows you DON'T have COVID-19  Even if your test is negative, you may still get COVID-19. It's rare but, sometimes, the test result is wrong. You could also catch the virus after taking the test.   There's a very small chance that you could catch COVID-19 in the hospital or surgery center. [facility] has taken many steps to prevent this from happening.   Day of your surgery or procedure    Please come wearing a mask or something else that covers both your nose and mouth.    When you arrive, we'll ask you some questions to find out if you have any signs or symptoms of COVID-19.    Ask your care team if you can have visitors. All visitors must wear masks and will be screened for signs of COVID-19.  ? Even if no visitors are allowed, you can still have with you:    Your legal guardian or legal decision " "maker    A parent and one other visitor, if you are younger than 18 years old    A partner and a , if you are in labor  ? We might need to teach you about taking care of yourself after surgery. If so, a visitor can come into the hospital to learn about it, too.  ? The rules for visitors change often, depending on how much the virus is spreading. To learn more, see Visiting a Loved One in the Hospital during the COVID-19 Outbreak.  Please call your care team, hospital or surgery center if you have any questions. We thank you for your understanding and for choosing [facility] for your care.   Questions and Answers  Does it matter where I get tested for COVID-19?  Yes. We urge you to get tested at one of our [facility] COVID-19 testing sites. We process these tests in our lab and can get the results quickly. Your [facility] care team needs to get your results before you check in.  What should I do if I can't get tested at [facility]?  You can get tested somewhere else, but you'll need to take these extra steps:  1. Contact your family doctor or clinic to arrange your test.  2. Take the test within 4 days of your surgery or procedure. We can't accept tests older than 4 days.  3. Make sure your doctor or clinic faxes your results to [facility] at [phone].  If we don't get your results in time, we may have to postpone or cancel your treatment.  Ask if you're getting a \"RT-PCR\" COVID-19 test. It should NOT be a \"rapid\" COVID-19 test. Many drug stores use \"rapid\" tests, but they may not be as accurate. We don't accept the results of \"rapid\" tests.  For informational purposes only. Not to replace the advice of your health care provider. Copyright   2020 [facility] [employer]. All rights reserved. Clinically reviewed by Infection Prevention and the [facility] COVID-19 Clinical Team. [employer] [id number] - REV 10/20.    "

## 2021-04-19 NOTE — PROGRESS NOTES
[facility]  [address] [facility] [address]  Phone: [phone]  Primary Provider: Natasha Ref-Primary, Physician  Pre-op Performing Provider: ANSELMO MONTOYA      PREOPERATIVE EVALUATION:  Today's date: 4/19/2021    Shae Pierre is a 56 year old female who presents for a preoperative evaluation.    Surgical Information:  Surgery/Procedure: left knee arthroscopy   Surgery Location: [facility]   Surgeon: Dr. Rodríguez   Surgery Date: 4/23/21  Time of Surgery: TBD  Where patient plans to recover: At home with family  Fax number for surgical facility:[phone] and [phone]    Type of Anesthesia Anticipated: General    Assessment & Plan     The proposed surgical procedure is considered INTERMEDIATE risk.      ICD-10-CM    1. Preop general physical exam  Z01.818 Methicillin Resistant Staph Aureus PCR     CBC with platelets   2. Other tear of medial meniscus of left knee as current injury, initial encounter  S83.242A Methicillin Resistant Staph Aureus PCR     CBC with platelets   3. Benign essential hypertension  I10 Basic metabolic panel  (Ca, Cl, CO2, Creat, Gluc, K, Na, BUN)   4. Morbid obesity (H)  E66.01    5. Screening for hyperlipidemia  Z13.220 Lipid panel reflex to direct LDL Fasting     Preoperative exam completed today.  Patient is low risk for planned procedure.  History of hypertension not on current medications.  Well-controlled at this time.  Encouraged her to continue efforts at weight loss.  Screening for hyperlipidemia completed today.  CBC, MRSA, and BMP labs collected.  Covid test to be done at surgery location.      Risks and Recommendations:  The patient has the following additional risks and recommendations for perioperative complications:   - No identified additional risk factors other than previously addressed    Medication Instructions:  Patient is on no chronic medications    RECOMMENDATION:  APPROVAL GIVEN to proceed with proposed  procedure, without further diagnostic evaluation.    Review of prior external note(s) from - Sports medicine 4/9/21  Review of the result(s) of each unique test - MRI            Subjective     HPI related to upcoming procedure: 4/9 patient followed up with sports medicine clinic regarding left knee pain.  She does have a history of left knee surgery in 1989.  Does not recall any specific injury to her knee however having worsening symptoms of sleep and waking is painful.  Pain is on the inside of the knee around the joint line she is also noticed swelling.  Clicking grinding symptoms are present no specific weakness or feeling like it will give out.  MRI completed to evaluate further findings as below:  Impression:  1. Finding highly suspicious for Parrot beak type radial tear  posterior horn body junction of the medial meniscus. Please correlate  with previous operative report.  2. Truncation of the body and posterior horn of the lateral meniscus  without reaching to the fluid signal intensity, possibly related to  prior partial meniscectomy; otherwise, consistent with tear. Please  correlate with previous operative report.  3. High-grade sprain with possible partial tear of the posterior  oblique ligament with associated focal regional synovitis and pes  anserine bursitis.  4. 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       Preop Questions 4/19/2021   1. Have you ever had a heart attack or stroke? No   2. Have you ever had surgery on your heart or blood vessels, such as a stent placement, a coronary artery bypass, or surgery on an artery in your head, neck, heart, or legs? No   3. Do you have chest pain with activity? No   4. Do you have a history of  heart failure? No   5. Do you currently have a cold, bronchitis or symptoms of other infection? No   6. Do you have a cough, shortness of breath, or wheezing? No   7. Do you  or anyone in your family have previous history of blood clots? No   8. Do you or does anyone in your family have a serious bleeding problem such as prolonged bleeding following surgeries or cuts? No   9. Have you ever had problems with anemia or been told to take iron pills? No   10. Have you had any abnormal blood loss such as black, tarry or bloody stools, or abnormal vaginal bleeding? No   11. Have you ever had a blood transfusion? No   12. Are you willing to have a blood transfusion if it is medically needed before, during, or after your surgery? Yes   13. Have you or any of your relatives ever had problems with anesthesia? No   14. Do you have sleep apnea, excessive snoring or daytime drowsiness? No   15. Do you have any artifical heart valves or other implanted medical devices like a pacemaker, defibrillator, or continuous glucose monitor? No   16. Do you have artificial joints? No   17. Are you allergic to latex? No   18. Is there any chance that you may be pregnant? No     Health Care Directive:  Patient does not have a Health Care Directive or Living Will: Discussed advance care planning with patient; however, patient declined at this time.    Preoperative Review of :   reviewed - controlled substances reflected in medication list. No longer takin      Status of Chronic Conditions:  HYPERTENSION - Patient has longstanding history of HTN , currently denies any symptoms referable to elevated blood pressure. Specifically denies chest pain, palpitations, dyspnea, orthopnea, PND or peripheral edema. Blood pressure readings have been in normal range. Does not take antihypertensives at this time.     Review of Systems  CONSTITUTIONAL: NEGATIVE for fever, chills, change in weight  INTEGUMENTARY/SKIN: NEGATIVE for worrisome rashes, moles or lesions  EYES: NEGATIVE for vision changes or irritation  ENT/MOUTH: NEGATIVE for ear, mouth and throat problems  RESP: NEGATIVE for significant cough or SOB  BREAST:  NEGATIVE for masses, tenderness or discharge  CV: NEGATIVE for chest pain, palpitations or peripheral edema  GI: NEGATIVE for nausea, abdominal pain, heartburn, or change in bowel habits  : NEGATIVE for frequency, dysuria, or hematuria  MUSCULOSKELETAL: NEGATIVE for significant arthralgias or myalgia  MUSCULOSKELETAL:POSITIVE  for left knee pain.   NEURO: NEGATIVE for weakness, dizziness or paresthesias  ENDOCRINE: NEGATIVE for temperature intolerance, skin/hair changes  HEME: NEGATIVE for bleeding problems  PSYCHIATRIC: NEGATIVE for changes in mood or affect    Patient Active Problem List    Diagnosis Date Noted     Morbid obesity (H) 04/19/2021     Priority: Medium     Chronic pain of left knee 04/09/2021     Priority: Medium     Benign essential hypertension 04/18/2018     Priority: Medium     Diagnosed 4/18/2018. Recommend lifestyle changes.  Recheck 3-6 months, initiate medical therapy       Urge incontinence of urine 12/03/2015     Priority: Medium     Breast cyst 06/15/2012     Priority: Medium     Migraine headache 01/23/2012     Priority: Medium     CARDIOVASCULAR SCREENING; LDL GOAL LESS THAN 160 10/31/2010     Priority: Medium     Obesity 03/27/2006     Priority: Medium     Problem list name updated by automated process. Provider to review       Dermatophytosis of nail 11/03/2005     Priority: Medium      Past Medical History:   Diagnosis Date     Dysplasia of cervix (uteri) 1998     Fibroids 2/22/2012     Other abnormal Pap smear and cervical HPV (human papapillomavirus) 9/29/2008    EM>40 and pos HPV unknown type rec rpt pap 6 mos/11/08  1/26/2009 F/u pap was normal  Will have next pap in 1 year.      Other acne      PONV (postoperative nausea and vomiting)      Past Surgical History:   Procedure Laterality Date     COLONOSCOPY N/A 4/16/2018    Procedure: COLONOSCOPY;  Colonoscopy;  Surgeon: Chuck Donovan MD;  Location: [facility]     EXCISE MASS TRUNK  7/13/2012    Procedure: EXCISE MASS TRUNK;   Excisional Removal Chest Wall Cysts X2;  Surgeon: Dennys Henriquez MD;  Location: [facility]     HYSTERECTOMY, PAP NO LONGER INDICATED       LAPAROSCOPIC ASSISTED HYSTERECTOMY VAGINAL, BILATERAL SALPINGO-OOPHORECTOMY, COMBINED  2012    LAVH-BSO     SURGICAL HISTORY OF -       LEEP     SURGICAL HISTORY OF -       Cryotherapy      SURGICAL HISTORY OF -       Colpo     SURGICAL HISTORY OF -       left knee/patella surgery     Current Outpatient Medications   Medication Sig Dispense Refill     ADVIL 200 MG OR TABS TAKE 1 TO 2 TABLETS EVERY 4 TO 6 HOURS AS NEEDED WITH FOOD 120 0     MULTI-VITAMIN OR TABS 1 TABLET DAILY 30 0     Omega-3 Fatty Acids (FISH OIL) 435 MG CAPS        SUMAtriptan (IMITREX) 100 MG tablet Take 1 tablet (100 mg) by mouth at onset of headache for migraine May repeat in 2 hours prn: max 2/day 12 tablet 4     GLUCOSAMINE SULFATE PO Take 500 mg by mouth daily       valACYclovir (VALTREX) 1000 mg tablet Take 1 tablet (1,000 mg) by mouth 3 times daily for 7 days 21 tablet 0       Allergies   Allergen Reactions     Codeine Other (See Comments) and Difficulty breathing     Throat closes     Morphine Other (See Comments) and Difficulty breathing     Throat closes     Sulfa Drugs Hives     Staples Other (See Comments) and Rash     Rash and infection after knee surgery        Social History     Tobacco Use     Smoking status: Former Smoker     Types: Cigarettes     Quit date: 2010     Years since quittin.0     Smokeless tobacco: Never Used   Substance Use Topics     Alcohol use: Yes     Comment: occ     Family History   Problem Relation Age of Onset     Breast Cancer Maternal Grandmother      Hypertension Father      C.A.D. Father      Alzheimer Disease Father      Lipids Brother      Lipids Brother      Lipids Brother      Lipids Brother      Lipids Sister      Lipids Sister      Circulatory Mother         AAA     Chronic Obstructive Pulmonary Disease Mother      Breast Cancer Maternal Aunt   "    History   Drug Use No         Objective     /88 (BP Location: Right arm, Patient Position: Chair, Cuff Size: Adult Large)   Pulse 67   Temp 96.1  F (35.6  C)   Resp 16   Ht 1.549 m (5' 1\")   Wt 85.7 kg (189 lb)   LMP 04/05/2012   SpO2 98%   BMI 35.71 kg/m      Physical Exam    GENERAL APPEARANCE: healthy, alert and no distress     EYES: EOMI, PERRL     HENT: ear canals and TM's normal and nose and mouth without ulcers or lesions     NECK: no adenopathy, no asymmetry, masses, or scars and thyroid normal to palpation     RESP: lungs clear to auscultation - no rales, rhonchi or wheezes     CV: regular rates and rhythm, normal S1 S2, no S3 or S4 and no murmur, click or rub     ABDOMEN:  soft, nontender, no HSM or masses and bowel sounds normal     MS: extremities normal- no gross deformities noted, no evidence of inflammation in joints, FROM in all extremities.     SKIN: no suspicious lesions or rashes     NEURO: Normal strength and tone, sensory exam grossly normal, mentation intact and speech normal     PSYCH: mentation appears normal. and affect normal/bright     LYMPHATICS: No cervical adenopathy    No results for input(s): HGB, PLT, INR, NA, POTASSIUM, CR, A1C in the last 54444 hours.     Diagnostics:  Labs pending at this time.  Results will be reviewed when available.   No EKG required, no history of coronary heart disease, significant arrhythmia, peripheral arterial disease or other structural heart disease.    Revised Cardiac Risk Index (RCRI):  The patient has the following serious cardiovascular risks for perioperative complications:   - No serious cardiac risks = 0 points     RCRI Interpretation: 0 points: Class I (very low risk - 0.4% complication rate)           Signed Electronically by: STEFANIE Fitzpatrick CNP  Copy of this evaluation report is provided to requesting physician.      "

## 2021-04-20 ENCOUNTER — TRANSFERRED RECORDS (OUTPATIENT)
Dept: HEALTH INFORMATION MANAGEMENT | Facility: CLINIC | Age: 56
End: 2021-04-20

## 2021-06-28 ENCOUNTER — ANCILLARY PROCEDURE (OUTPATIENT)
Dept: MAMMOGRAPHY | Facility: CLINIC | Age: 56
End: 2021-06-28
Attending: OBSTETRICS & GYNECOLOGY
Payer: COMMERCIAL

## 2021-06-28 DIAGNOSIS — Z12.31 SCREENING MAMMOGRAM, ENCOUNTER FOR: ICD-10-CM

## 2021-06-28 PROCEDURE — 77063 BREAST TOMOSYNTHESIS BI: CPT | Mod: TC | Performed by: RADIOLOGY

## 2021-06-28 PROCEDURE — 77067 SCR MAMMO BI INCL CAD: CPT | Mod: TC | Performed by: RADIOLOGY

## 2021-06-30 ENCOUNTER — OFFICE VISIT (OUTPATIENT)
Dept: FAMILY MEDICINE | Facility: CLINIC | Age: 56
End: 2021-06-30
Payer: COMMERCIAL

## 2021-06-30 VITALS
TEMPERATURE: 97.6 F | HEIGHT: 62 IN | HEART RATE: 60 BPM | DIASTOLIC BLOOD PRESSURE: 78 MMHG | SYSTOLIC BLOOD PRESSURE: 118 MMHG | BODY MASS INDEX: 34.23 KG/M2 | RESPIRATION RATE: 16 BRPM | WEIGHT: 186 LBS | OXYGEN SATURATION: 96 %

## 2021-06-30 DIAGNOSIS — Z00.00 ROUTINE HISTORY AND PHYSICAL EXAMINATION OF ADULT: Primary | ICD-10-CM

## 2021-06-30 DIAGNOSIS — Z90.710 H/O TOTAL HYSTERECTOMY: ICD-10-CM

## 2021-06-30 DIAGNOSIS — E78.1 HYPERTRIGLYCERIDEMIA: ICD-10-CM

## 2021-06-30 PROCEDURE — 99396 PREV VISIT EST AGE 40-64: CPT | Performed by: INTERNAL MEDICINE

## 2021-06-30 ASSESSMENT — MIFFLIN-ST. JEOR: SCORE: 1396.94

## 2021-06-30 NOTE — PROGRESS NOTES
SUBJECTIVE:   CC: Shae Pierre is an 56 year old woman who presents for preventive health visit.       Patient has been advised of split billing requirements and indicates understanding: Yes  Healthy Habits:     Getting at least 3 servings of Calcium per day:  Yes    Bi-annual eye exam:  Yes    Dental care twice a year:  Yes    Sleep apnea or symptoms of sleep apnea:  None    Diet:  Regular (no restrictions)    Frequency of exercise:  2-3 days/week    Duration of exercise:  15-30 minutes    Taking medications regularly:  Not Applicable    Barriers to taking medications:  Not applicable    Medication side effects:  Not applicable    PHQ-2 Total Score: 0    Additional concerns today:  No      [facility] - MRI completed here      Weight:  --normally walks 3.5 miles daily for exercise, but not as much recently because of knee surgery; often swims  --has not followed any specific diet plans/programs  --used Rx medication 24 years ago but doesn't remember which one; it was not phen-fen    Wt Readings from Last 5 Encounters:   21 84.4 kg (186 lb)   21 85.7 kg (189 lb)   21 88.5 kg (195 lb)   03/10/21 86.2 kg (190 lb)   21 88.5 kg (195 lb)         Hysterectomy: wonders if needs pap      Today's PHQ-2 Score:   PHQ-2 (  [employer]) 2021   Q1: Little interest or pleasure in doing things 0   Q2: Feeling down, depressed or hopeless 0   PHQ-2 Score 0       Abuse: Current or Past (Physical, Sexual or Emotional) - No  Do you feel safe in your environment? Yes    Have you ever done Advance Care Planning? (For example, a Health Directive, POLST, or a discussion with a medical provider or your loved ones about your wishes): Yes, patient states has an Advance Care Planning document and will bring a copy to the clinic.    Social History     Tobacco Use     Smoking status: Former Smoker     Types: Cigarettes     Quit date: 2010     Years since quittin.2     Smokeless tobacco:  Never Used   Substance Use Topics     Alcohol use: Yes     Comment: occ     If you drink alcohol do you typically have >3 drinks per day or >7 drinks per week? No    Alcohol Use 6/30/2021   Prescreen: >3 drinks/day or >7 drinks/week? No       Reviewed orders with patient.  Reviewed health maintenance and updated orders accordingly - Yes  Current Outpatient Medications   Medication Sig Dispense Refill     ADVIL 200 MG OR TABS TAKE 1 TO 2 TABLETS EVERY 4 TO 6 HOURS AS NEEDED WITH FOOD 120 0     GLUCOSAMINE SULFATE PO Take 500 mg by mouth daily       MULTI-VITAMIN OR TABS 1 TABLET DAILY 30 0     Omega-3 Fatty Acids (FISH OIL) 435 MG CAPS        SUMAtriptan (IMITREX) 100 MG tablet Take 1 tablet (100 mg) by mouth at onset of headache for migraine May repeat in 2 hours prn: max 2/day 12 tablet 4     valACYclovir (VALTREX) 1000 mg tablet Take 1 tablet (1,000 mg) by mouth 3 times daily for 7 days 21 tablet 0       Breast Cancer Screening:  Any new diagnosis of family breast, ovarian, or bowel cancer? No    FHS-7: No flowsheet data found.    Mammogram Screening: Recommended annual mammography  Pertinent mammograms are reviewed under the imaging tab.    History of abnormal Pap smear: Status post benign hysterectomy. Health Maintenance and Surgical History updated.  PAP / HPV 1/23/2012 12/22/2008 5/21/2008   PAP OTHER-NIL, See Result NIL OTHER-NIL EM>40     Reviewed and updated as needed this visit by clinical staff  Tobacco  Allergies  Meds  Problems  Med Hx  Surg Hx  Fam Hx  Soc Hx          Reviewed and updated as needed this visit by Provider     Problems                Review of Systems  CONSTITUTIONAL: NEGATIVE for fever, chills, change in weight  INTEGUMENTARY/SKIN: NEGATIVE for worrisome rashes, moles or lesions  EYES: NEGATIVE for vision changes or irritation  ENT: NEGATIVE for ear, mouth and throat problems  RESP: NEGATIVE for significant cough or SOB  BREAST: NEGATIVE for masses, tenderness or discharge  CV:  "NEGATIVE for chest pain, palpitations or peripheral edema  GI: NEGATIVE for nausea, abdominal pain, heartburn, or change in bowel habits  : NEGATIVE for unusual urinary or vaginal symptoms. No vaginal bleeding.  MUSCULOSKELETAL: NEGATIVE for significant arthralgias or myalgia  NEURO: NEGATIVE for weakness, dizziness or paresthesias  PSYCHIATRIC: NEGATIVE for changes in mood or affect      OBJECTIVE:   /78 (BP Location: Right arm, Patient Position: Sitting, Cuff Size: Adult Large)   Pulse 60   Temp 97.6  F (36.4  C) (Tympanic)   Resp 16   Ht 1.575 m (5' 2\")   Wt 84.4 kg (186 lb)   LMP 04/05/2012   SpO2 96%   Breastfeeding No   BMI 34.02 kg/m    Physical Exam  GENERAL: healthy, alert and no distress  EYES: Eyes grossly normal to inspection, PERRL and conjunctivae and sclerae normal  HENT: ear canals and TM's normal, nose and mouth without ulcers or lesions  NECK: no adenopathy, no asymmetry, masses, or scars and thyroid normal to palpation  RESP: lungs clear to auscultation - no rales, rhonchi or wheezes  CV: regular rate and rhythm, normal S1 S2, no S3 or S4, no murmur, click or rub, no peripheral edema and peripheral pulses strong  ABDOMEN: soft, nontender, no hepatosplenomegaly, no masses and bowel sounds normal  MS: no gross musculoskeletal defects noted, no edema  SKIN: no suspicious lesions or rashes  NEURO: Normal strength and tone, mentation intact and speech normal  PSYCH: mentation appears normal, affect normal/bright        ASSESSMENT/PLAN:       ICD-10-CM    1. Routine history and physical examination of adult  Z00.00    2. BMI 34.0-34.9,adult  Z68.34    3. H/O total hysterectomy  Z90.710    4. Hypertriglyceridemia  E78.1 Lipid panel reflex to direct LDL Fasting       Patient has been advised of split billing requirements and indicates understanding: Yes  COUNSELING:  Reviewed preventive health counseling, as reflected in patient instructions    Estimated body mass index is 34.02 kg/m  as " "calculated from the following:    Height as of this encounter: 1.575 m (5' 2\").    Weight as of this encounter: 84.4 kg (186 lb).    Weight management plan: Discussed healthy diet and exercise guidelines    She reports that she quit smoking about 11 years ago. Her smoking use included cigarettes. She has never used smokeless tobacco.      Counseling Resources:  ATP IV Guidelines  Pooled Cohorts Equation Calculator  Breast Cancer Risk Calculator  BRCA-Related Cancer Risk Assessment: FHS-7 Tool  FRAX Risk Assessment  ICSI Preventive Guidelines  Dietary Guidelines for Americans, 2010  USDA's MyPlate  ASA Prophylaxis  Lung CA Screening    Patience Barrett DO  [facility]  "

## 2021-06-30 NOTE — PATIENT INSTRUCTIONS
Healthcare maintenance:    1. Consider shingles and tetnaus booster    Weight   1. Noom or Weight watchers  2. Can consider medications for weight loss.  Follow-up in 3-6 months    Dr. Barrett's Tips for a Healthy Diet    1. Add more fresh fruits and vegetables to your diet.  The more colorful with the fruit or vegetable (think berries, spinach, carrots, peppers) the healthier it tends to be.  Juice is not a fruit.  Prepare the vegetables in a healthy way - steam, bake.  Avoid batter/breading, butter/oil to cook.  2. Add more fiber to your diet.  Swap out white bread, white rice, white pasta for whole grain versions.  Reduce the portion size and frequency of carbohydrates/starches.  3. Choose healthier fats such as nuts, olive oil, avocado, etc. Stay away from lard, butter.  4. Decrease the frequency and portion size of 'junk food' -pizza, candy, cookies, potato chips, etc.  5. Watch liquid calories such as coffee drinks, juice, soda, teas.  There tends to be excessive sugar in these beverages.  6. Increase protein in your diet.  Eggs, cheese, yogurt, nuts, lentils, chicken, fish are good healthy choices.  Protein keeps you yung longer, and you are less likely to have blood sugar spikes  7. Eat healthy at least 80% of the time.  It is ok for a special treat (Mom's spaghetti dinner, cake on your birthday) every once in a while, just not every day.  When are you going to indulge (think [employer] time), be sure you are eating extra healthy the day before and after.      Resources:    1. [employer] Resources for Health and Wellness:https://www.[email or web address]/dig-yes-foods    2.   [facility] Ways To Wellness:    https://www.[employer].org/services/ways-to-wellness  Ways to Wellness offers:    Nutrition and weight management     Corrective exercise and fitness training     Lifestyle and behavioral change     Healing services  Our team includes registered dietitians, certified personal trainers, life coaches, as well  as a Culinary Educator.    3. [facility] for Cardiovascular Disease Prevention  Consider making an appointment at the [facility] if either of the following describes you:    You are an adult who has risk factors for cardiovascular disease. Risk factors include cholesterol elevations, diabetes, high blood pressure, elevated weight, inactive living, and history of tobacco use.     You don t have traditional risk factors but have a strong family history of cardiovascular disease, which may mean you have a higher of risk of cardiovascular disease.     4. Atomic Habits by Chuck Samuels.  This a good book that looks into our habits and how to sustain good healthy habits and get rid of bad habits.      For weight loss:  --Exercise  for 30-60 minutes most days of the week.  --Make sure the exercise is getting your heart rate up.  For example, if you weigh 150 lbs, walking the dog at 3 mph (moderate pace) for 30 minutes only burns 110 calories!  --To lose 1 lb per week, you need to have a calorie deficit of at least 500 calories per day  --Snacks in between meals should be fruits and vegetables.  --No eating after dinner.  --Avoid sodas and energy drinks.  --Avoid alcohol.  --Avoid fast food and fried foods.  --Increase whole grains in diet.  --Increase fiber to 25 grams daily.  --Free diet website:  Www.[employer]    Weight Loss Apps:  1. Lose It!  2. My Fitness Pal - connects to Fit Bit  3. Fooducate - can scan bar codes of foods with your phone  4. [employer] - exercise videos for all skill levels  5. 7 Minute Workout Hilary    Estimate Calories Burned:  1. Http://www.[employer]/calculate/cbc  2. Http://www.[employer].com/exercise/lookup

## 2021-09-18 ENCOUNTER — HEALTH MAINTENANCE LETTER (OUTPATIENT)
Age: 56
End: 2021-09-18

## 2021-09-27 ENCOUNTER — HOSPITAL ENCOUNTER (EMERGENCY)
Facility: CLINIC | Age: 56
Discharge: HOME | End: 2021-09-27
Attending: FAMILY MEDICINE | Admitting: FAMILY MEDICINE
Payer: COMMERCIAL

## 2021-09-27 ENCOUNTER — APPOINTMENT (OUTPATIENT)
Dept: CT IMAGING | Facility: CLINIC | Age: 56
End: 2021-09-27
Attending: FAMILY MEDICINE
Payer: COMMERCIAL

## 2021-09-27 VITALS
HEART RATE: 56 BPM | OXYGEN SATURATION: 98 % | WEIGHT: 185 LBS | TEMPERATURE: 97.4 F | HEIGHT: 61 IN | DIASTOLIC BLOOD PRESSURE: 89 MMHG | BODY MASS INDEX: 34.93 KG/M2 | SYSTOLIC BLOOD PRESSURE: 133 MMHG | RESPIRATION RATE: 10 BRPM

## 2021-09-27 DIAGNOSIS — K57.32 DIVERTICULITIS OF COLON: Primary | ICD-10-CM

## 2021-09-27 DIAGNOSIS — K80.21 CALCULUS OF GALLBLADDER WITH BILIARY OBSTRUCTION BUT WITHOUT CHOLECYSTITIS: ICD-10-CM

## 2021-09-27 DIAGNOSIS — R93.5 ABNORMAL CT OF THE ABDOMEN: ICD-10-CM

## 2021-09-27 DIAGNOSIS — R19.7 BLOODY DIARRHEA: ICD-10-CM

## 2021-09-27 LAB
ABO/RH(D): NORMAL
ALBUMIN SERPL-MCNC: 3.4 G/DL (ref 3.4–5)
ALBUMIN UR-MCNC: NEGATIVE MG/DL
ALP SERPL-CCNC: 113 U/L (ref 40–150)
ALT SERPL W P-5'-P-CCNC: 35 U/L (ref 0–50)
ANION GAP SERPL CALCULATED.3IONS-SCNC: 8 MMOL/L (ref 3–14)
ANTIBODY SCREEN: NEGATIVE
APPEARANCE UR: CLEAR
AST SERPL W P-5'-P-CCNC: 19 U/L (ref 0–45)
BASOPHILS # BLD AUTO: 0.1 10E3/UL (ref 0–0.2)
BASOPHILS NFR BLD AUTO: 1 %
BILIRUB SERPL-MCNC: 0.5 MG/DL (ref 0.2–1.3)
BILIRUB UR QL STRIP: NEGATIVE
BUN SERPL-MCNC: 8 MG/DL (ref 7–30)
CALCIUM SERPL-MCNC: 8.7 MG/DL (ref 8.5–10.1)
CHLORIDE BLD-SCNC: 107 MMOL/L (ref 94–109)
CO2 SERPL-SCNC: 25 MMOL/L (ref 20–32)
COLOR UR AUTO: ABNORMAL
CREAT SERPL-MCNC: 0.64 MG/DL (ref 0.52–1.04)
D DIMER PPP FEU-MCNC: 0.47 UG/ML FEU (ref 0–0.5)
EOSINOPHIL # BLD AUTO: 0.3 10E3/UL (ref 0–0.7)
EOSINOPHIL NFR BLD AUTO: 2 %
ERYTHROCYTE [DISTWIDTH] IN BLOOD BY AUTOMATED COUNT: 12.9 % (ref 10–15)
GFR SERPL CREATININE-BSD FRML MDRD: >90 ML/MIN/1.73M2
GLUCOSE BLD-MCNC: 91 MG/DL (ref 70–99)
GLUCOSE UR STRIP-MCNC: NEGATIVE MG/DL
HCT VFR BLD AUTO: 44.5 % (ref 35–47)
HGB BLD-MCNC: 15.1 G/DL (ref 11.7–15.7)
HGB UR QL STRIP: ABNORMAL
HOLD SPECIMEN: NORMAL
IMM GRANULOCYTES # BLD: 0.1 10E3/UL
IMM GRANULOCYTES NFR BLD: 0 %
KETONES UR STRIP-MCNC: 5 MG/DL
LACTATE SERPL-SCNC: 1.1 MMOL/L (ref 0.7–2)
LEUKOCYTE ESTERASE UR QL STRIP: NEGATIVE
LIPASE SERPL-CCNC: 140 U/L (ref 73–393)
LYMPHOCYTES # BLD AUTO: 3.8 10E3/UL (ref 0.8–5.3)
LYMPHOCYTES NFR BLD AUTO: 31 %
MCH RBC QN AUTO: 31.9 PG (ref 26.5–33)
MCHC RBC AUTO-ENTMCNC: 33.9 G/DL (ref 31.5–36.5)
MCV RBC AUTO: 94 FL (ref 78–100)
MONOCYTES # BLD AUTO: 1 10E3/UL (ref 0–1.3)
MONOCYTES NFR BLD AUTO: 8 %
NEUTROPHILS # BLD AUTO: 7.1 10E3/UL (ref 1.6–8.3)
NEUTROPHILS NFR BLD AUTO: 58 %
NITRATE UR QL: NEGATIVE
NRBC # BLD AUTO: 0 10E3/UL
NRBC BLD AUTO-RTO: 0 /100
PH UR STRIP: 7 [PH] (ref 5–7)
PLATELET # BLD AUTO: 334 10E3/UL (ref 150–450)
POTASSIUM BLD-SCNC: 3.8 MMOL/L (ref 3.4–5.3)
PROT SERPL-MCNC: 7.3 G/DL (ref 6.8–8.8)
RBC # BLD AUTO: 4.74 10E6/UL (ref 3.8–5.2)
RBC URINE: 0 /HPF
SODIUM SERPL-SCNC: 140 MMOL/L (ref 133–144)
SP GR UR STRIP: 1.04 (ref 1–1.03)
SPECIMEN EXPIRATION DATE: NORMAL
SQUAMOUS EPITHELIAL: 1 /HPF
TROPONIN I SERPL-MCNC: <0.015 UG/L (ref 0–0.04)
UROBILINOGEN UR STRIP-MCNC: NORMAL MG/DL
WBC # BLD AUTO: 12.3 10E3/UL (ref 4–11)
WBC URINE: 1 /HPF

## 2021-09-27 PROCEDURE — 36415 COLL VENOUS BLD VENIPUNCTURE: CPT | Performed by: FAMILY MEDICINE

## 2021-09-27 PROCEDURE — 99285 EMERGENCY DEPT VISIT HI MDM: CPT | Mod: 25 | Performed by: FAMILY MEDICINE

## 2021-09-27 PROCEDURE — 85379 FIBRIN DEGRADATION QUANT: CPT | Performed by: FAMILY MEDICINE

## 2021-09-27 PROCEDURE — 250N000009 HC RX 250: Performed by: FAMILY MEDICINE

## 2021-09-27 PROCEDURE — 84484 ASSAY OF TROPONIN QUANT: CPT | Performed by: FAMILY MEDICINE

## 2021-09-27 PROCEDURE — 81001 URINALYSIS AUTO W/SCOPE: CPT | Performed by: FAMILY MEDICINE

## 2021-09-27 PROCEDURE — 85004 AUTOMATED DIFF WBC COUNT: CPT | Performed by: FAMILY MEDICINE

## 2021-09-27 PROCEDURE — 93005 ELECTROCARDIOGRAM TRACING: CPT | Performed by: FAMILY MEDICINE

## 2021-09-27 PROCEDURE — 96360 HYDRATION IV INFUSION INIT: CPT | Mod: 59 | Performed by: FAMILY MEDICINE

## 2021-09-27 PROCEDURE — 250N000011 HC RX IP 250 OP 636: Performed by: FAMILY MEDICINE

## 2021-09-27 PROCEDURE — 74177 CT ABD & PELVIS W/CONTRAST: CPT

## 2021-09-27 PROCEDURE — 86900 BLOOD TYPING SEROLOGIC ABO: CPT | Performed by: FAMILY MEDICINE

## 2021-09-27 PROCEDURE — 93010 ELECTROCARDIOGRAM REPORT: CPT | Performed by: FAMILY MEDICINE

## 2021-09-27 PROCEDURE — 71275 CT ANGIOGRAPHY CHEST: CPT

## 2021-09-27 PROCEDURE — 80053 COMPREHEN METABOLIC PANEL: CPT | Performed by: FAMILY MEDICINE

## 2021-09-27 PROCEDURE — 96361 HYDRATE IV INFUSION ADD-ON: CPT | Performed by: FAMILY MEDICINE

## 2021-09-27 PROCEDURE — 258N000003 HC RX IP 258 OP 636: Performed by: FAMILY MEDICINE

## 2021-09-27 PROCEDURE — 83690 ASSAY OF LIPASE: CPT | Performed by: FAMILY MEDICINE

## 2021-09-27 PROCEDURE — 83605 ASSAY OF LACTIC ACID: CPT | Performed by: FAMILY MEDICINE

## 2021-09-27 RX ORDER — SODIUM CHLORIDE 9 MG/ML
INJECTION, SOLUTION INTRAVENOUS CONTINUOUS
Status: DISCONTINUED | OUTPATIENT
Start: 2021-09-27 | End: 2021-09-27 | Stop reason: HOSPADM

## 2021-09-27 RX ORDER — IOPAMIDOL 755 MG/ML
80 INJECTION, SOLUTION INTRAVASCULAR ONCE
Status: COMPLETED | OUTPATIENT
Start: 2021-09-27 | End: 2021-09-27

## 2021-09-27 RX ORDER — OXYCODONE HYDROCHLORIDE 5 MG/1
5 TABLET ORAL EVERY 6 HOURS PRN
Qty: 6 TABLET | Refills: 0 | Status: SHIPPED | OUTPATIENT
Start: 2021-09-27 | End: 2021-10-26

## 2021-09-27 RX ORDER — ONDANSETRON 2 MG/ML
4 INJECTION INTRAMUSCULAR; INTRAVENOUS EVERY 30 MIN PRN
Status: DISCONTINUED | OUTPATIENT
Start: 2021-09-27 | End: 2021-09-27 | Stop reason: HOSPADM

## 2021-09-27 RX ORDER — ONDANSETRON 4 MG/1
4 TABLET, ORALLY DISINTEGRATING ORAL EVERY 8 HOURS PRN
Qty: 10 TABLET | Refills: 0 | Status: SHIPPED | OUTPATIENT
Start: 2021-09-27 | End: 2021-09-30

## 2021-09-27 RX ADMIN — IOPAMIDOL 80 ML: 755 INJECTION, SOLUTION INTRAVENOUS at 13:59

## 2021-09-27 RX ADMIN — SODIUM CHLORIDE 100 ML: 9 INJECTION, SOLUTION INTRAVENOUS at 13:59

## 2021-09-27 RX ADMIN — SODIUM CHLORIDE 1000 ML: 9 INJECTION, SOLUTION INTRAVENOUS at 13:41

## 2021-09-27 ASSESSMENT — ENCOUNTER SYMPTOMS
DIARRHEA: 1
DIAPHORESIS: 0
COUGH: 0
SHORTNESS OF BREATH: 1
FREQUENCY: 0
BLOOD IN STOOL: 1
CONSTIPATION: 0
VOMITING: 0
FEVER: 0
CHILLS: 1
NAUSEA: 0
ABDOMINAL PAIN: 1
PALPITATIONS: 0
DYSURIA: 0
WHEEZING: 0
SORE THROAT: 0
SINUS PRESSURE: 0
HEADACHES: 0

## 2021-09-27 ASSESSMENT — MIFFLIN-ST. JEOR: SCORE: 1376.53

## 2021-09-27 NOTE — ED NOTES
MD at bedside.      Pt presents to ED with complaints of CP and abd pain.  Pt CP started yesterday.  Pt has epigastric pain, and loose stools with bright red blood present.  VSS.  A&Ox4.

## 2021-09-27 NOTE — DISCHARGE INSTRUCTIONS
ICD-10-CM    1. Diverticulitis of colon  K57.32     take augmentin 875 mg orally twice daily for 10 days.,  zofran as needed for nausea, vomniting. collect stool samples due to bloody diarrhea. take 1000 mg orally every 6 hours as needed foprpain./ use oxycodone for breakthrough pain   2. Bloody diarrhea  R19.7 Clostridium difficile toxin B PCR     Enteric Bacteria and Virus Panel by [facility] Stool   3. Calculus of gallbladder with biliary obstruction but without cholecystitis  K80.21 Adult General Surg Referral    this may be causing biliary colic.  consider outpatient u/s GB.  follow-up general surgery

## 2021-09-27 NOTE — Clinical Note
Shae Pierre was seen and treated in our emergency department on 9/27/2021.  She may return to work on 10/02/2021.       If you have any questions or concerns, please don't hesitate to call.      Hemant Asher MD

## 2021-09-27 NOTE — ED PROVIDER NOTES
History     Chief Complaint   Patient presents with     Chest Pain     since yesterday     Abdominal Pain     midline, epigastric radiated to lower quads; loose and blood stools     HPI  Shae Pierre is a 56 year old female who presents with abdominal pain over the epigastrium radiating into the lower abdomen suprapubic pain without dysuria.  Multiple episodes of diarrheal stool and this has been bloody.  She notes bright red blood no black tarry stools.  Frequently use of ibuprofen.  Uses tobacco.  She has history of diverticulosis.  She also has pain in her left chest.  Worse with activity possibly with gardening and moving chest wall.  No radiation of that pain.  Sense of mild dyspnea.  Chills without fever.  No cough congestion upper respiratory symptoms.  Chest pain is been ongoing for possibly as long as 3 weeks.    No VTE risk other than she uses tobacco about 15 cigarettes/week.  Denies significant alcohol use.  Denies dysuria urgency frequency hematuria.  Prior surgeries include total hysterectomy.      Allergies:  Allergies   Allergen Reactions     Codeine Other (See Comments) and Difficulty breathing     Throat closes     Morphine Other (See Comments) and Difficulty breathing     Throat closes     Sulfa Drugs Hives     Staples Other (See Comments) and Rash     Rash and infection after knee surgery       Problem List:    Patient Active Problem List    Diagnosis Date Noted     H/O total hysterectomy 06/30/2021     Priority: Medium     Benign pathology       Chronic pain of left knee 04/09/2021     Priority: Medium     Benign essential hypertension 04/18/2018     Priority: Medium     Diagnosed 4/18/2018. Recommend lifestyle changes.  Recheck 3-6 months, initiate medical therapy       Urge incontinence of urine 12/03/2015     Priority: Medium     Breast cyst 06/15/2012     Priority: Medium     Migraine headache 01/23/2012     Priority: Medium     CARDIOVASCULAR SCREENING; LDL GOAL LESS THAN 160 10/31/2010      Priority: Medium     BMI 34.0-34.9,adult 2006     Priority: Medium     Dermatophytosis of nail 2005     Priority: Medium        Past Medical History:    Past Medical History:   Diagnosis Date     Dysplasia of cervix (uteri) 1998     Fibroids 2012     Other abnormal Pap smear and cervical HPV (human papapillomavirus) 2008     Other acne      PONV (postoperative nausea and vomiting)        Past Surgical History:    Past Surgical History:   Procedure Laterality Date     COLONOSCOPY N/A 2018    Procedure: COLONOSCOPY;  Colonoscopy;  Surgeon: Chuck Donovan MD;  Location: [facility]     EXCISE MASS TRUNK  2012    Procedure: EXCISE MASS TRUNK;  Excisional Removal Chest Wall Cysts X2;  Surgeon: Dennys Henriquez MD;  Location: [facility]     HYSTERECTOMY, PAP NO LONGER INDICATED       LAPAROSCOPIC ASSISTED HYSTERECTOMY VAGINAL, BILATERAL SALPINGO-OOPHORECTOMY, COMBINED  2012    LAVH-BSO     SURGICAL HISTORY OF -       LEEP     SURGICAL HISTORY OF -       Cryotherapy      SURGICAL HISTORY OF -       Colpo     SURGICAL HISTORY OF -  Left     meniscal injury       Family History:    Family History   Problem Relation Age of Onset     Breast Cancer Maternal Grandmother      Hypertension Father      C.A.D. Father      Alzheimer Disease Father      Lipids Brother      Heart Disease Brother 58        MI     Lipids Brother      Heart Disease Brother 60        MI     Lipids Brother      Lipids Brother      Lipids Sister      Lipids Sister      Circulatory Mother         AAA     Chronic Obstructive Pulmonary Disease Mother      No Known Problems Daughter      Breast Cancer Maternal Aunt      No Known Problems Daughter        Social History:  Marital Status:   [4]  Social History     Tobacco Use     Smoking status: Former Smoker     Types: Cigarettes     Quit date: 2010     Years since quittin.4     Smokeless tobacco: Never Used   Substance Use Topics     Alcohol use: Yes      "Comment: occ     Drug use: No        Medications:    ADVIL 200 MG OR TABS  GLUCOSAMINE SULFATE PO  MULTI-VITAMIN OR TABS  Omega-3 Fatty Acids (FISH OIL) 435 MG CAPS  SUMAtriptan (IMITREX) 100 MG tablet          Review of Systems   Constitutional: Positive for chills. Negative for diaphoresis and fever.   HENT: Negative for ear pain, sinus pressure and sore throat.    Eyes: Negative for visual disturbance.   Respiratory: Positive for shortness of breath. Negative for cough and wheezing.    Cardiovascular: Positive for chest pain. Negative for palpitations.   Gastrointestinal: Positive for abdominal pain, blood in stool and diarrhea. Negative for constipation, nausea and vomiting.   Genitourinary: Negative for dysuria, frequency and urgency.   Skin: Negative for rash.   Neurological: Negative for headaches.   All other systems reviewed and are negative.      Physical Exam   BP: (!) 137/91  Pulse: 61  Temp: 97.4  F (36.3  C)  Resp: 18  Height: 154.9 cm (5' 1\")  Weight: 83.9 kg (185 lb)  SpO2: 97 %      Physical Exam  Constitutional:       General: She is in acute distress.      Appearance: She is not diaphoretic.   HENT:      Head: Atraumatic.   Eyes:      Conjunctiva/sclera: Conjunctivae normal.   Cardiovascular:      Rate and Rhythm: Normal rate and regular rhythm.      Heart sounds: Normal heart sounds. No murmur heard.     Pulmonary:      Effort: Pulmonary effort is normal. No respiratory distress.      Breath sounds: Normal breath sounds. No stridor. No wheezing or rhonchi.   Abdominal:      General: Abdomen is flat. There is no distension.      Palpations: There is no mass.      Tenderness: There is abdominal tenderness in the right lower quadrant. There is no guarding.      Hernia: No hernia is present.   Musculoskeletal:      Cervical back: Neck supple.      Right lower leg: No edema.      Left lower leg: No edema.   Skin:     Coloration: Skin is not pale.      Findings: No rash.   Neurological:      General: " No focal deficit present.      Mental Status: She is alert.      Motor: No weakness.         ED Course        Procedures                EKG Interpretation:      Interpreted by Hemant Asher MD  EKG done at 1229 hrs. demonstrates a sinus rhythm at 61 bpm normal axis.  There is no ST change.  No T wave changes.  Normal R progression and no Q waves.  Normal intervals.  Normal conduction.  No ectopy.  Impression sinus rhythm 61 bpm no acute change.      Critical Care time:  none               Results for orders placed or performed during the hospital encounter of 09/27/21 (from the past 24 hour(s))   [place] Draw    Narrative    The following orders were created for panel order [place] Draw.  Procedure                               Abnormality         Status                     ---------                               -----------         ------                     Extra Blue Top Tube[[id number]]                              Final result               Extra Red Top Tube[[id number]]                               Final result               Extra Green Top (Lithium...[[id number]]                      Final result               Extra Purple Top Tube[[id number]]                            Final result               Extra Green Top (Lithium...[[id number]]                      Final result                 Please view results for these tests on the individual orders.   Extra Blue Top Tube   Result Value Ref Range    Hold Specimen JIC    Extra Red Top Tube   Result Value Ref Range    Hold Specimen JIC    Extra Green Top (Lithium Heparin) Tube   Result Value Ref Range    Hold Specimen JIC    Extra Purple Top Tube   Result Value Ref Range    Hold Specimen JIC    Extra Green Top (Lithium Heparin) ON ICE   Result Value Ref Range    Hold Specimen JIC    CBC with platelets differential    Narrative    The following orders were created for panel order CBC with platelets differential.  Procedure                               Abnormality          Status                     ---------                               -----------         ------                     CBC with platelets and d...[[id number]]  Abnormal            Final result                 Please view results for these tests on the individual orders.   Comprehensive metabolic panel   Result Value Ref Range    Sodium 140 133 - 144 mmol/L    Potassium 3.8 3.4 - 5.3 mmol/L    Chloride 107 94 - 109 mmol/L    Carbon Dioxide (CO2) 25 20 - 32 mmol/L    Anion Gap 8 3 - 14 mmol/L    Urea Nitrogen 8 7 - 30 mg/dL    Creatinine 0.64 0.52 - 1.04 mg/dL    Calcium 8.7 8.5 - 10.1 mg/dL    Glucose 91 70 - 99 mg/dL    Alkaline Phosphatase 113 40 - 150 U/L    AST 19 0 - 45 U/L    ALT 35 0 - 50 U/L    Protein Total 7.3 6.8 - 8.8 g/dL    Albumin 3.4 3.4 - 5.0 g/dL    Bilirubin Total 0.5 0.2 - 1.3 mg/dL    GFR Estimate >90 >60 mL/min/1.73m2   Lipase   Result Value Ref Range    Lipase 140 73 - 393 U/L   Lactic acid whole blood   Result Value Ref Range    Lactic Acid 1.1 0.7 - 2.0 mmol/L   Troponin I   Result Value Ref Range    Troponin I <0.015 0.000 - 0.045 ug/L   D dimer quantitative   Result Value Ref Range    D-Dimer Quantitative 0.47 0.00 - 0.50 ug/mL FEU    Narrative    This D-dimer assay is intended for use in conjunction with a clinical pretest probability assessment model to exclude pulmonary embolism (PE) and deep venous thrombosis (DVT) in outpatients suspected of PE or DVT. The cut-off value is 0.50 ug/mL FEU.   ABO/Rh type and screen    Narrative    The following orders were created for panel order ABO/Rh type and screen.  Procedure                               Abnormality         Status                     ---------                               -----------         ------                     Adult Type and Screen[[id number]]                            Final result                 Please view results for these tests on the individual orders.   CBC with platelets and differential   Result Value Ref Range     WBC Count 12.3 (H) 4.0 - 11.0 10e3/uL    RBC Count 4.74 3.80 - 5.20 10e6/uL    Hemoglobin 15.1 11.7 - 15.7 g/dL    Hematocrit 44.5 35.0 - 47.0 %    MCV 94 78 - 100 fL    MCH 31.9 26.5 - 33.0 pg    MCHC 33.9 31.5 - 36.5 g/dL    RDW 12.9 10.0 - 15.0 %    Platelet Count 334 150 - 450 10e3/uL    % Neutrophils 58 %    % Lymphocytes 31 %    % Monocytes 8 %    % Eosinophils 2 %    % Basophils 1 %    % Immature Granulocytes 0 %    NRBCs per 100 WBC 0 <1 /100    Absolute Neutrophils 7.1 1.6 - 8.3 10e3/uL    Absolute Lymphocytes 3.8 0.8 - 5.3 10e3/uL    Absolute Monocytes 1.0 0.0 - 1.3 10e3/uL    Absolute Eosinophils 0.3 0.0 - 0.7 10e3/uL    Absolute Basophils 0.1 0.0 - 0.2 10e3/uL    Absolute Immature Granulocytes 0.1 (H) <=0.0 10e3/uL    Absolute NRBCs 0.0 10e3/uL   Adult Type and Screen   Result Value Ref Range    ABO/RH(D) A POS     Antibody Screen Negative Negative    SPECIMEN EXPIRATION DATE [id number]    CT Chest (PE) Abdomen Pelvis w Contrast    Narrative    CT CHEST PE ABDOMEN AND PELVIS WITH CONTRAST 9/27/2021 2:22 PM    CLINICAL HISTORY: Pleuritic chest pain, right-sided, dyspnea,  abdominal pain, bloody diarrhea.    TECHNIQUE: CT angiogram chest and routine CT abdomen pelvis with IV  contrast. Arterial phase through the chest and venous phase through  the abdomen and pelvis. 2D and 3D MIP reconstructions were performed  by the CT technologist. Dose reduction techniques were used.     CONTRAST: 80 mL [id number]-[id number]     COMPARISON: None.    FINDINGS:  ANGIOGRAM CHEST: Pulmonary arteries are normal caliber and negative  for pulmonary emboli. Thoracic aorta is negative for dissection.    LUNGS AND PLEURA: No effusions or pneumothorax. No acute airspace  disease identified.    MEDIASTINUM/AXILLAE: Normal.    CORONARY ARTERY CALCIFICATION: None.    HEPATOBILIARY: Cholelithiasis. Wall thickening of a decompressed  gallbladder noted. No acute liver abnormality. Small subtle hypodense  0.5 cm nodule anterior left liver  series 16 image 44.    PANCREAS: Normal.    SPLEEN: Normal.    ADRENAL GLANDS: Normal.    KIDNEYS/BLADDER: No hydronephrosis or obstructing stone. Small right  extrarenal pelvis. No bladder stone or bladder acute abnormality.  Increased density exophytic medial left upper pole renal lesion is 2.3  cm series 16 image 79.    BOWEL: No obstruction. There is wall thickening and edema at the  proximal sigmoid colon left lower abdomen series 16 image 159. There  are multiple diverticula in this region. This suggests mild sigmoid  diverticulitis. No abscess or free air.    LYMPH NODES: Normal.    PELVIC ORGANS: No acute abnormality. Uterus and ovaries not seen.    OTHER: Mild vascular calcifications.    MUSCULOSKELETAL: No aggressive bony abnormality.      Impression    IMPRESSION:  1.  No evidence for pulmonary embolism or acute chest abnormality.  2.  Sigmoid diverticulitis is suggested. No abscess.  3.  Cholelithiasis and mild wall thickening of the gallbladder.  Correlate with any clinical concern for cholecystitis. Ultrasound  could provide further assessment.  4.  Small indeterminant hypodense nodule at the left liver. Recommend  correlation with nonurgent liver MRI.  5.  Mildly increased density presumed cyst at the upper left kidney.  This could just be a hemorrhagic cyst. Renal MRI could also be  performed to assess for any underlying solid component.    EMBER PALMER MD         SYSTEM ID:  [id number]   UA reflex to Microscopic   Result Value Ref Range    Color Urine Straw Colorless, Straw, Light Yellow, Yellow    Appearance Urine Clear Clear    Glucose Urine Negative Negative mg/dL    Bilirubin Urine Negative Negative    Ketones Urine 5  (A) Negative mg/dL    Specific Gravity Urine 1.040 (H) 1.003 - 1.035    Blood Urine Small (A) Negative    pH Urine 7.0 5.0 - 7.0    Protein Albumin Urine Negative Negative mg/dL    Urobilinogen Urine Normal Normal, 2.0 mg/dL    Nitrite Urine Negative Negative    Leukocyte Esterase  Urine Negative Negative    RBC Urine 0 <=2 /HPF    WBC Urine 1 <=5 /HPF    Squamous Epithelials Urine 1 <=1 /HPF       Medications   0.9% sodium chloride BOLUS (has no administration in time range)     Followed by   sodium chloride 0.9% infusion (has no administration in time range)   ondansetron (ZOFRAN) injection 4 mg (has no administration in time range)       Assessments & Plan (with Medical Decision Making)     MDM: Shae Pierre is a 56 year old female presents with both chest pain and abdominal pain as well as bloody diarrhea developing over the last days.  The chest pain may have been longer this may have been ongoing for the last 3 weeks or longer.  The right upper quadrant and optically tender.  Pain is been primarily in the epigastrium radiating up and down from the suprapubic region.  Differential diagnosis includes an acute colitis.  Will obtain stool samples if available.  Lactic acid.  CT imaging.  As for the chest will include PE on evaluation with CT PE on chest.  EKG is nonischemic.  Obtain troponin.  D-dimer.  I    Imaging is reassuring other than demonstrating diverticulitis without signs of perforation.  Recommendations as below with precautions for return.  We discussed potential options including ciprofloxacin and Flagyl versus Augmentin.  At this point with the findings I am seeing Augmentin may be sufficient and with less risk of complication.  We did discuss using it with probiotics.  Precautions given for return.  Of note is that her calculus within the gallbladder is certainly sizable.  Her LFTs are normal.  Suspect diverticulitis is responsible for current complaints but cannot exclude that some of her symptoms could be cholelithiasis and biliary colic related with the chest pain she had in there therefore recommended that she have follow-up in general surgery clinic and consult was placed.  Precautions given for return.  Follow-up with primary provider.  She is also had a colonoscopy  within the last 3 to 4 years which may be sufficient for follow-up regarding diverticulitis, but had her discussed this further with her primary provider.  I have reviewed the nursing notes.    I have reviewed the findings, diagnosis, plan and need for follow up with the patient.       New Prescriptions    No medications on file       Final diagnoses:   Diverticulitis of colon - take augmentin 875 mg orally twice daily for 10 days.,  zofran as needed for nausea, vomniting. collect stool samples due to bloody diarrhea. take 1000 mg orally every 6 hours as needed foprpain./ use oxycodone for breakthrough pain   Bloody diarrhea   Calculus of gallbladder with biliary obstruction but without cholecystitis - this may be causing biliary colic.  consider outpatient u/s GB.  follow-up general surgery   Abnormal CT of the abdomen - discuss liver and kidney incideintal findings with primary provider       9/27/2021   [facility] EMERGENCY DEPT     Hemant Asher MD  09/27/21 [id number]

## 2021-10-04 ENCOUNTER — OFFICE VISIT (OUTPATIENT)
Dept: SURGERY | Facility: CLINIC | Age: 56
End: 2021-10-04
Payer: COMMERCIAL

## 2021-10-04 VITALS
HEART RATE: 74 BPM | SYSTOLIC BLOOD PRESSURE: 141 MMHG | BODY MASS INDEX: 34.93 KG/M2 | DIASTOLIC BLOOD PRESSURE: 88 MMHG | HEIGHT: 61 IN | TEMPERATURE: 99.3 F | WEIGHT: 185 LBS

## 2021-10-04 DIAGNOSIS — K80.21 CALCULUS OF GALLBLADDER WITH BILIARY OBSTRUCTION BUT WITHOUT CHOLECYSTITIS: ICD-10-CM

## 2021-10-04 PROCEDURE — 99203 OFFICE O/P NEW LOW 30 MIN: CPT | Performed by: SURGERY

## 2021-10-04 ASSESSMENT — MIFFLIN-ST. JEOR: SCORE: 1376.53

## 2021-10-04 NOTE — PROGRESS NOTES
PCP:  No Ref-Primary, Physician  Requesting: Dr. Hemant Asher    Chief complaint: Gallstones    History of Present Illness: Shae is a 56-year-old female who presents to the surgical clinic for several things 1 is a lesion on her right breast skin which she would like removed.  We can do this either in clinic or the operating room if we end up there.    Second is she was found to have gallstones at a recent emergency room visit.  She was seen in the emergency room on 27 September with complaints of chest pain and epigastric abdominal pain.  Apparently this was quite severe.  She has had on and off episodes for at least 3 weeks.  She had a work-up that included CT scans of her chest to rule out a pulmonary embolus.  The scans found a single large gallstone in her gallbladder, a questionable lesion in her liver (left lobe), and a lesion coming off of her left kidney which was thought maybe to be a cyst although it measures solid density.    She has had no change in the color of her urine or stool.  She is been treated for diverticulitis with antibiotics since that was discovered at the time of her CT scan as well.  That pain seems to have resolved.  She still has pain in the right side of her abdomen which is episodic.  She also has pain in her lower back.  It is unclear to me if these are related to eating or not.  Her only previous surgical procedure in her abdomen was a laparoscopic assisted hysterectomy.    Histories:  Past Medical History:   Diagnosis Date     Dysplasia of cervix (uteri) 1998     Fibroids 2/22/2012     Other abnormal Pap smear and cervical HPV (human papapillomavirus) 9/29/2008    EM>40 and pos HPV unknown type rec rpt pap 6 mos/11/08  1/26/2009 F/u pap was normal  Will have next pap in 1 year.      Other acne      PONV (postoperative nausea and vomiting)        Past Surgical History:   Procedure Laterality Date     COLONOSCOPY N/A 04/16/2018    Procedure: COLONOSCOPY;  Colonoscopy;  Surgeon:  Chuck Donovan MD;  Location: [facility]     EXCISE MASS TRUNK  2012    Procedure: EXCISE MASS TRUNK;  Excisional Removal Chest Wall Cysts X2;  Surgeon: Dennys Henriquez MD;  Location: [facility]     HYSTERECTOMY, PAP NO LONGER INDICATED       LAPAROSCOPIC ASSISTED HYSTERECTOMY VAGINAL, BILATERAL SALPINGO-OOPHORECTOMY, COMBINED  2012    LAVH-BSO     SURGICAL HISTORY OF -       LEEP     SURGICAL HISTORY OF -       Cryotherapy      SURGICAL HISTORY OF -       Colpo     SURGICAL HISTORY OF -  Left     meniscal injury       Family History   Problem Relation Age of Onset     Breast Cancer Maternal Grandmother      Hypertension Father      C.A.D. Father      Alzheimer Disease Father      Lipids Brother      Heart Disease Brother 58        MI     Lipids Brother      Heart Disease Brother 60        MI     Lipids Brother      Lipids Brother      Lipids Sister      Lipids Sister      Circulatory Mother         AAA     Chronic Obstructive Pulmonary Disease Mother      No Known Problems Daughter      Breast Cancer Maternal Aunt      No Known Problems Daughter        Social History     Tobacco Use     Smoking status: Former Smoker     Types: Cigarettes     Quit date: 2010     Years since quittin.5     Smokeless tobacco: Never Used   Substance Use Topics     Alcohol use: Yes     Comment: occ       Current Outpatient Medications   Medication Sig Dispense Refill     ADVIL 200 MG OR TABS TAKE 1 TO 2 TABLETS EVERY 4 TO 6 HOURS AS NEEDED WITH FOOD 120 0     amoxicillin-clavulanate (AUGMENTIN) 875-125 MG tablet Take 1 tablet by mouth 2 times daily 20 tablet 0     GLUCOSAMINE SULFATE PO Take 500 mg by mouth daily       MULTI-VITAMIN OR TABS 1 TABLET DAILY 30 0     Omega-3 Fatty Acids (FISH OIL) 435 MG CAPS        SUMAtriptan (IMITREX) 100 MG tablet Take 1 tablet (100 mg) by mouth at onset of headache for migraine May repeat in 2 hours prn: max 2/day 12 tablet 4     oxyCODONE (ROXICODONE) 5 MG tablet Take 1 tablet (5  mg) by mouth every 6 hours as needed for breakthrough pain or moderate to severe pain (Patient not taking: Reported on 10/4/2021) 6 tablet 0       Allergies   Allergen Reactions     Codeine Other (See Comments) and Difficulty breathing     Throat closes     Morphine Other (See Comments) and Difficulty breathing     Throat closes     Sulfa Drugs Hives     Staples Other (See Comments) and Rash     Rash and infection after knee surgery       Images:  Recent Results (from the past 744 hour(s))   CT Chest (PE) Abdomen Pelvis w Contrast    Narrative    CT CHEST PE ABDOMEN AND PELVIS WITH CONTRAST 9/27/2021 2:22 PM    CLINICAL HISTORY: Pleuritic chest pain, right-sided, dyspnea,  abdominal pain, bloody diarrhea.    TECHNIQUE: CT angiogram chest and routine CT abdomen pelvis with IV  contrast. Arterial phase through the chest and venous phase through  the abdomen and pelvis. 2D and 3D MIP reconstructions were performed  by the CT technologist. Dose reduction techniques were used.     CONTRAST: 80 mL Isovue-370     COMPARISON: None.    FINDINGS:  ANGIOGRAM CHEST: Pulmonary arteries are normal caliber and negative  for pulmonary emboli. Thoracic aorta is negative for dissection.    LUNGS AND PLEURA: No effusions or pneumothorax. No acute airspace  disease identified.    MEDIASTINUM/AXILLAE: Normal.    CORONARY ARTERY CALCIFICATION: None.    HEPATOBILIARY: Cholelithiasis. Wall thickening of a decompressed  gallbladder noted. No acute liver abnormality. Small subtle hypodense  0.5 cm nodule anterior left liver series 16 image 44.    PANCREAS: Normal.    SPLEEN: Normal.    ADRENAL GLANDS: Normal.    KIDNEYS/BLADDER: No hydronephrosis or obstructing stone. Small right  extrarenal pelvis. No bladder stone or bladder acute abnormality.  Increased density exophytic medial left upper pole renal lesion is 2.3  cm series 16 image 79.    BOWEL: No obstruction. There is wall thickening and edema at the  proximal sigmoid colon left lower  "abdomen series 16 image 159. There  are multiple diverticula in this region. This suggests mild sigmoid  diverticulitis. No abscess or free air.    LYMPH NODES: Normal.    PELVIC ORGANS: No acute abnormality. Uterus and ovaries not seen.    OTHER: Mild vascular calcifications.    MUSCULOSKELETAL: No aggressive bony abnormality.      Impression    IMPRESSION:  1.  No evidence for pulmonary embolism or acute chest abnormality.  2.  Sigmoid diverticulitis is suggested. No abscess.  3.  Cholelithiasis and mild wall thickening of the gallbladder.  Correlate with any clinical concern for cholecystitis. Ultrasound  could provide further assessment.  4.  Small indeterminant hypodense nodule at the left liver. Recommend  correlation with nonurgent liver MRI.  5.  Mildly increased density presumed cyst at the upper left kidney.  This could just be a hemorrhagic cyst. Renal MRI could also be  performed to assess for any underlying solid component.    EMBER PALMER MD         SYSTEM ID:  [id number]       Labs:  No results found for any visits on 10/04/21.    ROS:  Review of systems negative except as above    BP (!) 141/88 (BP Location: Right arm, Patient Position: Sitting, Cuff Size: Adult Large)   Pulse 74   Temp 99.3  F (37.4  C) (Tympanic)   Ht 1.549 m (5' 1\")   Wt 83.9 kg (185 lb)   LMP 04/05/2012   BMI 34.96 kg/m      Exam:  General - Alert and Oriented X4, NAD, well nourished  HEENT - Normocephalic, atraumatic  Neck - supple  Lungs -respirations unlabored, chest wall excursion normal   CV - Heart RRR  Abdomen - Soft, non-tender, +BS  Neuro -grossly intact  Extremities - No cyanosis, clubbing or edema.      Assessment and Plan: Patient presents with a number of issues which she wanted me to address:    1.  She has a 1 cm lesion on her right breast which does not want to heal.  This has the appearance of a basal cell cancer so it definitely needs to be removed and I will take care of that at either a clinic visit or in " the operating room if we end up performing surgery.    2.  Gallstones-she would like to pursue cholecystectomy and she is convinced that her symptoms makes sense for gallbladder disease after his spoke to her about the pathophysiology of stones.  We discussed the indications for surgery, the risks, benefits, alternatives, and surgical details.  She would like to schedule this however I would like to obtain an MRI of her abdomen to evaluate the liver lesion and the renal lesion before we commit her to an operation.  I suspect both of these are benign issues but I want to make sure and she is comfortable heading that direction.    The plan is for the MRI as mentioned and I will see her back afterwards for discussion of the results.  Then we can plan surgery.  She is comfortable with that plan.  I spent about 30 minutes with her in face-to-face contact.     Chip Santana MD FACS        Chip Santana MD

## 2021-10-04 NOTE — PATIENT INSTRUCTIONS
Per physician instructions      If you have questions or concerns on any instructions given to you by your provider today or if you need to schedule an appointment, you can reach us at [phone].

## 2021-10-04 NOTE — LETTER
10/4/2021         RE: Shae Pierre  [id number] [address] [place] [id number]        Dear Colleague,    Thank you for referring your patient, Shae Pierre, to the [facility]. Please see a copy of my visit note below.    PCP:  No Ref-Primary, Physician  Requesting: Dr. Hemant Asher    Chief complaint: Gallstones    History of Present Illness: Shae is a 56-year-old female who presents to the surgical clinic for several things 1 is a lesion on her right breast skin which she would like removed.  We can do this either in clinic or the operating room if we end up there.    Second is she was found to have gallstones at a recent emergency room visit.  She was seen in the emergency room on 27 September with complaints of chest pain and epigastric abdominal pain.  Apparently this was quite severe.  She has had on and off episodes for at least 3 weeks.  She had a work-up that included CT scans of her chest to rule out a pulmonary embolus.  The scans found a single large gallstone in her gallbladder, a questionable lesion in her liver (left lobe), and a lesion coming off of her left kidney which was thought maybe to be a cyst although it measures solid density.    She has had no change in the color of her urine or stool.  She is been treated for diverticulitis with antibiotics since that was discovered at the time of her CT scan as well.  That pain seems to have resolved.  She still has pain in the right side of her abdomen which is episodic.  She also has pain in her lower back.  It is unclear to me if these are related to eating or not.  Her only previous surgical procedure in her abdomen was a laparoscopic assisted hysterectomy.    Histories:  Past Medical History:   Diagnosis Date     Dysplasia of cervix (uteri) 1998     Fibroids 2/22/2012     Other abnormal Pap smear and cervical HPV (human papapillomavirus) 9/29/2008    EM>40 and pos HPV unknown type rec rpt pap 6 mos/11/08  1/26/2009 F/u pap  was normal  Will have next pap in 1 year.      Other acne      PONV (postoperative nausea and vomiting)        Past Surgical History:   Procedure Laterality Date     COLONOSCOPY N/A 2018    Procedure: COLONOSCOPY;  Colonoscopy;  Surgeon: Chuck Donovan MD;  Location: [facility]     EXCISE MASS TRUNK  2012    Procedure: EXCISE MASS TRUNK;  Excisional Removal Chest Wall Cysts X2;  Surgeon: Dennys Henriquez MD;  Location: [facility]     HYSTERECTOMY, PAP NO LONGER INDICATED       LAPAROSCOPIC ASSISTED HYSTERECTOMY VAGINAL, BILATERAL SALPINGO-OOPHORECTOMY, COMBINED  2012    LAVH-BSO     SURGICAL HISTORY OF -       LEEP     SURGICAL HISTORY OF -       Cryotherapy      SURGICAL HISTORY OF -       Colpo     SURGICAL HISTORY OF -  Left     meniscal injury       Family History   Problem Relation Age of Onset     Breast Cancer Maternal Grandmother      Hypertension Father      C.A.D. Father      Alzheimer Disease Father      Lipids Brother      Heart Disease Brother 58        MI     Lipids Brother      Heart Disease Brother 60        MI     Lipids Brother      Lipids Brother      Lipids Sister      Lipids Sister      Circulatory Mother         AAA     Chronic Obstructive Pulmonary Disease Mother      No Known Problems Daughter      Breast Cancer Maternal Aunt      No Known Problems Daughter        Social History     Tobacco Use     Smoking status: Former Smoker     Types: Cigarettes     Quit date: 2010     Years since quittin.5     Smokeless tobacco: Never Used   Substance Use Topics     Alcohol use: Yes     Comment: occ       Current Outpatient Medications   Medication Sig Dispense Refill     ADVIL 200 MG OR TABS TAKE 1 TO 2 TABLETS EVERY 4 TO 6 HOURS AS NEEDED WITH FOOD 120 0     amoxicillin-clavulanate (AUGMENTIN) 875-125 MG tablet Take 1 tablet by mouth 2 times daily 20 tablet 0     GLUCOSAMINE SULFATE PO Take 500 mg by mouth daily       MULTI-VITAMIN OR TABS 1 TABLET DAILY 30 0     Omega-3 Fatty  Acids (FISH OIL) 435 MG CAPS        SUMAtriptan (IMITREX) 100 MG tablet Take 1 tablet (100 mg) by mouth at onset of headache for migraine May repeat in 2 hours prn: max 2/day 12 tablet 4     oxyCODONE (ROXICODONE) 5 MG tablet Take 1 tablet (5 mg) by mouth every 6 hours as needed for breakthrough pain or moderate to severe pain (Patient not taking: Reported on 10/4/2021) 6 tablet 0       Allergies   Allergen Reactions     Codeine Other (See Comments) and Difficulty breathing     Throat closes     Morphine Other (See Comments) and Difficulty breathing     Throat closes     Sulfa Drugs Hives     Staples Other (See Comments) and Rash     Rash and infection after knee surgery       Images:  Recent Results (from the past 744 hour(s))   CT Chest (PE) Abdomen Pelvis w Contrast    Narrative    CT CHEST PE ABDOMEN AND PELVIS WITH CONTRAST 9/27/2021 2:22 PM    CLINICAL HISTORY: Pleuritic chest pain, right-sided, dyspnea,  abdominal pain, bloody diarrhea.    TECHNIQUE: CT angiogram chest and routine CT abdomen pelvis with IV  contrast. Arterial phase through the chest and venous phase through  the abdomen and pelvis. 2D and 3D MIP reconstructions were performed  by the CT technologist. Dose reduction techniques were used.     CONTRAST: 80 mL Isovue-370     COMPARISON: None.    FINDINGS:  ANGIOGRAM CHEST: Pulmonary arteries are normal caliber and negative  for pulmonary emboli. Thoracic aorta is negative for dissection.    LUNGS AND PLEURA: No effusions or pneumothorax. No acute airspace  disease identified.    MEDIASTINUM/AXILLAE: Normal.    CORONARY ARTERY CALCIFICATION: None.    HEPATOBILIARY: Cholelithiasis. Wall thickening of a decompressed  gallbladder noted. No acute liver abnormality. Small subtle hypodense  0.5 cm nodule anterior left liver series 16 image 44.    PANCREAS: Normal.    SPLEEN: Normal.    ADRENAL GLANDS: Normal.    KIDNEYS/BLADDER: No hydronephrosis or obstructing stone. Small right  extrarenal pelvis. No  "bladder stone or bladder acute abnormality.  Increased density exophytic medial left upper pole renal lesion is 2.3  cm series 16 image 79.    BOWEL: No obstruction. There is wall thickening and edema at the  proximal sigmoid colon left lower abdomen series 16 image 159. There  are multiple diverticula in this region. This suggests mild sigmoid  diverticulitis. No abscess or free air.    LYMPH NODES: Normal.    PELVIC ORGANS: No acute abnormality. Uterus and ovaries not seen.    OTHER: Mild vascular calcifications.    MUSCULOSKELETAL: No aggressive bony abnormality.      Impression    IMPRESSION:  1.  No evidence for pulmonary embolism or acute chest abnormality.  2.  Sigmoid diverticulitis is suggested. No abscess.  3.  Cholelithiasis and mild wall thickening of the gallbladder.  Correlate with any clinical concern for cholecystitis. Ultrasound  could provide further assessment.  4.  Small indeterminant hypodense nodule at the left liver. Recommend  correlation with nonurgent liver MRI.  5.  Mildly increased density presumed cyst at the upper left kidney.  This could just be a hemorrhagic cyst. Renal MRI could also be  performed to assess for any underlying solid component.    EMBER PALMER MD         SYSTEM ID:  [id number]       Labs:  No results found for any visits on 10/04/21.    ROS:  Review of systems negative except as above    BP (!) 141/88 (BP Location: Right arm, Patient Position: Sitting, Cuff Size: Adult Large)   Pulse 74   Temp 99.3  F (37.4  C) (Tympanic)   Ht 1.549 m (5' 1\")   Wt 83.9 kg (185 lb)   LMP 04/05/2012   BMI 34.96 kg/m      Exam:  General - Alert and Oriented X4, NAD, well nourished  HEENT - Normocephalic, atraumatic  Neck - supple  Lungs -respirations unlabored, chest wall excursion normal   CV - Heart RRR  Abdomen - Soft, non-tender, +BS  Neuro -grossly intact  Extremities - No cyanosis, clubbing or edema.      Assessment and Plan: Patient presents with a number of issues which she " wanted me to address:    1.  She has a 1 cm lesion on her right breast which does not want to heal.  This has the appearance of a basal cell cancer so it definitely needs to be removed and I will take care of that at either a clinic visit or in the operating room if we end up performing surgery.    2.  Gallstones-she would like to pursue cholecystectomy and she is convinced that her symptoms makes sense for gallbladder disease after his spoke to her about the pathophysiology of stones.  We discussed the indications for surgery, the risks, benefits, alternatives, and surgical details.  She would like to schedule this however I would like to obtain an MRI of her abdomen to evaluate the liver lesion and the renal lesion before we commit her to an operation.  I suspect both of these are benign issues but I want to make sure and she is comfortable heading that direction.    The plan is for the MRI as mentioned and I will see her back afterwards for discussion of the results.  Then we can plan surgery.  She is comfortable with that plan.  I spent about 30 minutes with her in face-to-face contact.     Chip Santana MD FACS        Chip Santana MD               Again, thank you for allowing me to participate in the care of your patient.        Sincerely,        Chip Santana MD

## 2021-10-12 ENCOUNTER — HOSPITAL ENCOUNTER (OUTPATIENT)
Dept: MRI IMAGING | Facility: CLINIC | Age: 56
Discharge: HOME | End: 2021-10-12
Attending: SURGERY | Admitting: SURGERY
Payer: COMMERCIAL

## 2021-10-12 ENCOUNTER — TELEPHONE (OUTPATIENT)
Dept: DERMATOLOGY | Facility: CLINIC | Age: 56
End: 2021-10-12

## 2021-10-12 DIAGNOSIS — K80.21 CALCULUS OF GALLBLADDER WITH BILIARY OBSTRUCTION BUT WITHOUT CHOLECYSTITIS: ICD-10-CM

## 2021-10-12 PROCEDURE — A9585 GADOBUTROL INJECTION: HCPCS | Performed by: SURGERY

## 2021-10-12 PROCEDURE — 255N000002 HC RX 255 OP 636: Performed by: SURGERY

## 2021-10-12 PROCEDURE — 74183 MRI ABD W/O CNTR FLWD CNTR: CPT

## 2021-10-12 RX ORDER — GADOBUTROL 604.72 MG/ML
8 INJECTION INTRAVENOUS ONCE
Status: COMPLETED | OUTPATIENT
Start: 2021-10-12 | End: 2021-10-12

## 2021-10-12 RX ADMIN — GADOBUTROL 8 ML: 604.72 INJECTION INTRAVENOUS at 16:22

## 2021-10-12 NOTE — TELEPHONE ENCOUNTER
[facility] Call Center    Phone Message    May a detailed message be left on voicemail: yes     Reason for Call: Other: Pt has possible BCC lesion on breast, needs procedure to remove. Please call pt to discuss.     Action Taken: Message routed to:  Other: [facility]    Travel Screening: Not Applicable

## 2021-10-13 ENCOUNTER — TELEPHONE (OUTPATIENT)
Dept: DERMATOLOGY | Facility: CLINIC | Age: 56
End: 2021-10-13

## 2021-10-13 ENCOUNTER — OFFICE VISIT (OUTPATIENT)
Dept: DERMATOLOGY | Facility: CLINIC | Age: 56
End: 2021-10-13
Payer: COMMERCIAL

## 2021-10-13 VITALS — DIASTOLIC BLOOD PRESSURE: 84 MMHG | HEART RATE: 64 BPM | SYSTOLIC BLOOD PRESSURE: 119 MMHG | OXYGEN SATURATION: 98 %

## 2021-10-13 DIAGNOSIS — L81.4 LENTIGO: ICD-10-CM

## 2021-10-13 DIAGNOSIS — D22.9 NEVUS: ICD-10-CM

## 2021-10-13 DIAGNOSIS — D18.01 ANGIOMA OF SKIN: ICD-10-CM

## 2021-10-13 DIAGNOSIS — D23.9 DERMAL NEVUS: ICD-10-CM

## 2021-10-13 DIAGNOSIS — L72.0 EPIDERMAL CYST: ICD-10-CM

## 2021-10-13 DIAGNOSIS — L82.1 SEBORRHEIC KERATOSIS: Primary | ICD-10-CM

## 2021-10-13 PROCEDURE — 99213 OFFICE O/P EST LOW 20 MIN: CPT | Mod: 25 | Performed by: DERMATOLOGY

## 2021-10-13 PROCEDURE — 88331 PATH CONSLTJ SURG 1 BLK 1SPC: CPT | Performed by: DERMATOLOGY

## 2021-10-13 PROCEDURE — 11102 TANGNTL BX SKIN SINGLE LES: CPT | Performed by: DERMATOLOGY

## 2021-10-13 NOTE — LETTER
10/13/2021         RE: Shae Pierre  [address] [address] [place] [id number]        Dear Colleague,    Thank you for referring your patient, Shae Pierre, to the [facility]. Please see a copy of my visit note below.    Shae Pierre is an extremely pleasant 56 year old year old female patient here today for spot on right breast.   .   Patient states this has been present for a while.  Patient reports the following symptoms:  Was tender and now wont heal.  .  Patient reports the following previous treatments none.  These treatments did not work.  Patient reports the following modifying factors none.  Associated symptoms: none.  Patient has no other skin complaints today.  Remainder of the HPI, Meds, PMH, Allergies, FH, and SH was reviewed in chart.      Past Medical History:   Diagnosis Date     Dysplasia of cervix (uteri) 1998     Fibroids 2/22/2012     Other abnormal Pap smear and cervical HPV (human papapillomavirus) 9/29/2008    EM>40 and pos HPV unknown type rec rpt pap 6 mos/11/08  1/26/2009 F/u pap was normal  Will have next pap in 1 year.      Other acne      PONV (postoperative nausea and vomiting)        Past Surgical History:   Procedure Laterality Date     COLONOSCOPY N/A 04/16/2018    Procedure: COLONOSCOPY;  Colonoscopy;  Surgeon: Chuck Donovan MD;  Location: [facility]     EXCISE MASS TRUNK  07/13/2012    Procedure: EXCISE MASS TRUNK;  Excisional Removal Chest Wall Cysts X2;  Surgeon: Dennys Henriquez MD;  Location: [facility]     HYSTERECTOMY, PAP NO LONGER INDICATED       LAPAROSCOPIC ASSISTED HYSTERECTOMY VAGINAL, BILATERAL SALPINGO-OOPHORECTOMY, COMBINED  04/05/2012    LAVH-BSO     SURGICAL HISTORY OF -       LEEP     SURGICAL HISTORY OF -       Cryotherapy      SURGICAL HISTORY OF -       Colpo     SURGICAL HISTORY OF -  Left     meniscal injury        Family History   Problem Relation Age of Onset     Breast Cancer Maternal Grandmother      Hypertension Father       SETH Father      Alzheimer Disease Father      Lipids Brother      Heart Disease Brother 58        MI     Lipids Brother      Heart Disease Brother 60        MI     Lipids Brother      Lipids Brother      Lipids Sister      Lipids Sister      Circulatory Mother         AAA     Chronic Obstructive Pulmonary Disease Mother      No Known Problems Daughter      Breast Cancer Maternal Aunt      No Known Problems Daughter        Social History     Socioeconomic History     Marital status:      Spouse name: Not on file     Number of children: Not on file     Years of education: Not on file     Highest education level: Not on file   Occupational History     Occupation:      Employer: [employer]     Comment: [employer]   Tobacco Use     Smoking status: Former Smoker     Types: Cigarettes     Quit date: 2010     Years since quittin.5     Smokeless tobacco: Never Used   Substance and Sexual Activity     Alcohol use: Yes     Comment: occ     Drug use: No     Sexual activity: Not Currently     Partners: Male   Other Topics Concern     Parent/sibling w/ CABG, MI or angioplasty before 65F 55M? No   Social History Narrative     Not on file     Social Determinants of Health     Financial Resource Strain:      Difficulty of Paying Living Expenses:    Food Insecurity:      Worried About Running Out of Food in the Last Year:      Ran Out of Food in the Last Year:    Transportation Needs:      Lack of Transportation (Medical):      Lack of Transportation (Non-Medical):    Physical Activity:      Days of Exercise per Week:      Minutes of Exercise per Session:    Stress:      Feeling of Stress :    Social Connections:      Frequency of Communication with Friends and Family:      Frequency of Social Gatherings with Friends and Family:      Attends Buddhism Services:      Active Member of Clubs or Organizations:      Attends Club or Organization Meetings:      Marital Status:     Intimate Partner Violence:      Fear of Current or Ex-Partner:      Emotionally Abused:      Physically Abused:      Sexually Abused:        Outpatient Encounter Medications as of 10/13/2021   Medication Sig Dispense Refill     ADVIL 200 MG OR TABS TAKE 1 TO 2 TABLETS EVERY 4 TO 6 HOURS AS NEEDED WITH FOOD 120 0     amoxicillin-clavulanate (AUGMENTIN) 875-125 MG tablet Take 1 tablet by mouth 2 times daily 20 tablet 0     GLUCOSAMINE SULFATE PO Take 500 mg by mouth daily       MULTI-VITAMIN OR TABS 1 TABLET DAILY 30 0     Omega-3 Fatty Acids (FISH OIL) 435 MG CAPS        SUMAtriptan (IMITREX) 100 MG tablet Take 1 tablet (100 mg) by mouth at onset of headache for migraine May repeat in 2 hours prn: max 2/day 12 tablet 4     oxyCODONE (ROXICODONE) 5 MG tablet Take 1 tablet (5 mg) by mouth every 6 hours as needed for breakthrough pain or moderate to severe pain (Patient not taking: Reported on 10/4/2021) 6 tablet 0     Facility-Administered Encounter Medications as of 10/13/2021   Medication Dose Route Frequency Provider Last Rate Last Admin     [COMPLETED] gadobutrol (GADAVIST) injection 8 mL  8 mL Intravenous Once Chip Santana MD   8 mL at 10/12/21 1622             O:   NAD, WDWN, Alert & Oriented, Mood & Affect wnl, Vitals stable   Here today alone   /84   Pulse 64   LMP 04/05/2012   SpO2 98%    General appearance normal   Vitals stable   Alert, oriented and in no acute distress      Following lymph nodes palpated: Occipital, Cervical, Supraclavicular no lad   Pigmented macules on trunk and ext with regular borders and pigment networks  R breast 2cm red plaque     Stuck on papules and brown macules on trunk and ext   Red papules on trunk  Flesh colored papules on trunk     The remainder of the full exam was normal; the following areas were examined:  conjunctiva/lids, oral mucosa, neck, peripheral vascular system, abdomen, lymph nodes, digits/nails, eccrine and apocrine glands, scalp/hair, face, neck,  chest, abdomen, buttocks, back, RUE, LUE, RLE, LLE       Eyes: Conjunctivae/lids:Normal     ENT: Lips, buccal mucosa, tongue: normal    MSK:Normal    Cardiovascular: peripheral edema none    Pulm: Breathing Normal    Lymph Nodes: No Head and Neck Lymphadenopathy     Neuro/Psych: Orientation:Alert and Orientedx3 ; Mood/Affect:normal       MICRO:   R breast:Unremarkable epidermis with parallel bundles of cellular collagen within the superficial dermis.   In dermis cyst lined by stratified squamous epithelium with epidermal keratinization No concerning areas for malignancy.      A/P:  1. Seborrheic keratosis, lentigo, angioma, dermal nevus, nevi   2. R breast r/o basal cell carcinoma   TANGENTIAL BIOPSY IN HOUSE:  After consent, anesthesia with LEC and prep, tangential excision performed and dx above confirmed with frozen section histology.  No complications and routine wound care.  Patient is not on  anticoagulants and risk of bleeding discussed with patient.       I have personally reviewed all specimens and/or slides and used them with my medical judgement to determine or confirm the final diagnosis.     Patient told result scar and epidermal cyst  Excision of clinical follow up discussed with patient .      It was a pleasure speaking to Shae Pierre today.  Previous clinic notes and pertinent laboratory tests were reviewed prior to Shae Pierre's visit.  BENIGN LESIONS DISCUSSED WITH PATIENT:  I discussed the specifics of tumor, prognosis, and genetics of benign lesions.  I explained that treatment of these lesions would be purely cosmetic and not medically neccessary.  I discussed with patient different removal options including excision, cautery and /or laser.      Nature and genetics of benign skin lesions dicussed with patient.  Signs and Symptoms of skin cancer discussed with patient.  Patient encouraged to perform monthly skin exams.  UV precautions reviewed with patient.  Return to clinic 12  months        Again, thank you for allowing me to participate in the care of your patient.        Sincerely,        Steffen Maldonado MD

## 2021-10-13 NOTE — PROGRESS NOTES
Shae Pierre is an extremely pleasant 56 year old year old female patient here today for spot on right breast.   .   Patient states this has been present for a while.  Patient reports the following symptoms:  Was tender and now wont heal.  .  Patient reports the following previous treatments none.  These treatments did not work.  Patient reports the following modifying factors none.  Associated symptoms: none.  Patient has no other skin complaints today.  Remainder of the HPI, Meds, PMH, Allergies, FH, and SH was reviewed in chart.      Past Medical History:   Diagnosis Date     Dysplasia of cervix (uteri) 1998     Fibroids 2/22/2012     Other abnormal Pap smear and cervical HPV (human papapillomavirus) 9/29/2008    EM>40 and pos HPV unknown type rec rpt pap 6 mos/11/08  1/26/2009 F/u pap was normal  Will have next pap in 1 year.      Other acne      PONV (postoperative nausea and vomiting)        Past Surgical History:   Procedure Laterality Date     COLONOSCOPY N/A 04/16/2018    Procedure: COLONOSCOPY;  Colonoscopy;  Surgeon: Chuck Donovan MD;  Location: [facility]     EXCISE MASS TRUNK  07/13/2012    Procedure: EXCISE MASS TRUNK;  Excisional Removal Chest Wall Cysts X2;  Surgeon: Dennys Henriquez MD;  Location: [facility]     HYSTERECTOMY, PAP NO LONGER INDICATED       LAPAROSCOPIC ASSISTED HYSTERECTOMY VAGINAL, BILATERAL SALPINGO-OOPHORECTOMY, COMBINED  04/05/2012    LAVH-BSO     SURGICAL HISTORY OF -       LEEP     SURGICAL HISTORY OF -       Cryotherapy      SURGICAL HISTORY OF -       Colpo     SURGICAL HISTORY OF -  Left     meniscal injury        Family History   Problem Relation Age of Onset     Breast Cancer Maternal Grandmother      Hypertension Father      C.A.D. Father      Alzheimer Disease Father      Lipids Brother      Heart Disease Brother 58        MI     Lipids Brother      Heart Disease Brother 60        MI     Lipids Brother      Lipids Brother      Lipids Sister      Lipids Sister       Circulatory Mother         AAA     Chronic Obstructive Pulmonary Disease Mother      No Known Problems Daughter      Breast Cancer Maternal Aunt      No Known Problems Daughter        Social History     Socioeconomic History     Marital status:      Spouse name: Not on file     Number of children: Not on file     Years of education: Not on file     Highest education level: Not on file   Occupational History     Occupation:      Employer: [employer]     Comment: [employer]   Tobacco Use     Smoking status: Former Smoker     Types: Cigarettes     Quit date: 2010     Years since quittin.5     Smokeless tobacco: Never Used   Substance and Sexual Activity     Alcohol use: Yes     Comment: occ     Drug use: No     Sexual activity: Not Currently     Partners: Male   Other Topics Concern     Parent/sibling w/ CABG, MI or angioplasty before 65F 55M? No   Social History Narrative     Not on file     Social Determinants of Health     Financial Resource Strain:      Difficulty of Paying Living Expenses:    Food Insecurity:      Worried About Running Out of Food in the Last Year:      Ran Out of Food in the Last Year:    Transportation Needs:      Lack of Transportation (Medical):      Lack of Transportation (Non-Medical):    Physical Activity:      Days of Exercise per Week:      Minutes of Exercise per Session:    Stress:      Feeling of Stress :    Social Connections:      Frequency of Communication with Friends and Family:      Frequency of Social Gatherings with Friends and Family:      Attends Confucianism Services:      Active Member of Clubs or Organizations:      Attends Club or Organization Meetings:      Marital Status:    Intimate Partner Violence:      Fear of Current or Ex-Partner:      Emotionally Abused:      Physically Abused:      Sexually Abused:        Outpatient Encounter Medications as of 10/13/2021   Medication Sig Dispense Refill     ADVIL 200 MG OR  TABS TAKE 1 TO 2 TABLETS EVERY 4 TO 6 HOURS AS NEEDED WITH FOOD 120 0     amoxicillin-clavulanate (AUGMENTIN) 875-125 MG tablet Take 1 tablet by mouth 2 times daily 20 tablet 0     GLUCOSAMINE SULFATE PO Take 500 mg by mouth daily       MULTI-VITAMIN OR TABS 1 TABLET DAILY 30 0     Omega-3 Fatty Acids (FISH OIL) 435 MG CAPS        SUMAtriptan (IMITREX) 100 MG tablet Take 1 tablet (100 mg) by mouth at onset of headache for migraine May repeat in 2 hours prn: max 2/day 12 tablet 4     oxyCODONE (ROXICODONE) 5 MG tablet Take 1 tablet (5 mg) by mouth every 6 hours as needed for breakthrough pain or moderate to severe pain (Patient not taking: Reported on 10/4/2021) 6 tablet 0     Facility-Administered Encounter Medications as of 10/13/2021   Medication Dose Route Frequency Provider Last Rate Last Admin     [COMPLETED] gadobutrol (GADAVIST) injection 8 mL  8 mL Intravenous Once Chip Santana MD   8 mL at 10/12/21 1622             O:   NAD, WDWN, Alert & Oriented, Mood & Affect wnl, Vitals stable   Here today alone   /84   Pulse 64   LMP 04/05/2012   SpO2 98%    General appearance normal   Vitals stable   Alert, oriented and in no acute distress      Following lymph nodes palpated: Occipital, Cervical, Supraclavicular no lad   Pigmented macules on trunk and ext with regular borders and pigment networks  R breast 2cm red plaque     Stuck on papules and brown macules on trunk and ext   Red papules on trunk  Flesh colored papules on trunk     The remainder of the full exam was normal; the following areas were examined:  conjunctiva/lids, oral mucosa, neck, peripheral vascular system, abdomen, lymph nodes, digits/nails, eccrine and apocrine glands, scalp/hair, face, neck, chest, abdomen, buttocks, back, RUE, LUE, RLE, LLE       Eyes: Conjunctivae/lids:Normal     ENT: Lips, buccal mucosa, tongue: normal    MSK:Normal    Cardiovascular: peripheral edema none    Pulm: Breathing Normal    Lymph Nodes: No Head and Neck  Lymphadenopathy     Neuro/Psych: Orientation:Alert and Orientedx3 ; Mood/Affect:normal       MICRO:   R breast:Unremarkable epidermis with parallel bundles of cellular collagen within the superficial dermis.   In dermis cyst lined by stratified squamous epithelium with epidermal keratinization No concerning areas for malignancy.      A/P:  1. Seborrheic keratosis, lentigo, angioma, dermal nevus, nevi   2. R breast r/o basal cell carcinoma   TANGENTIAL BIOPSY IN HOUSE:  After consent, anesthesia with LEC and prep, tangential excision performed and dx above confirmed with frozen section histology.  No complications and routine wound care.  Patient is not on  anticoagulants and risk of bleeding discussed with patient.       I have personally reviewed all specimens and/or slides and used them with my medical judgement to determine or confirm the final diagnosis.     Patient told result scar and epidermal cyst  Excision of clinical follow up discussed with patient .      It was a pleasure speaking to Shae Pierre today.  Previous clinic notes and pertinent laboratory tests were reviewed prior to Shae Pierre's visit.  BENIGN LESIONS DISCUSSED WITH PATIENT:  I discussed the specifics of tumor, prognosis, and genetics of benign lesions.  I explained that treatment of these lesions would be purely cosmetic and not medically neccessary.  I discussed with patient different removal options including excision, cautery and /or laser.      Nature and genetics of benign skin lesions dicussed with patient.  Signs and Symptoms of skin cancer discussed with patient.  Patient encouraged to perform monthly skin exams.  UV precautions reviewed with patient.  Return to clinic 12 months

## 2021-10-13 NOTE — PATIENT INSTRUCTIONS
Wound Care Instructions     FOR SUPERFICIAL WOUNDS     [facility] [phone]    [facility] [phone]           Right breast    AFTER 24 HOURS YOU SHOULD REMOVE THE BANDAGE AND BEGIN DAILY DRESSING CHANGES AS FOLLOWS:     1) Remove Dressing.     2) Clean and dry the area with tap water using a Q-tip or sterile gauze pad.     3) Apply Vaseline, Aquaphor, Polysporin ointment or Bacitracin ointment over entire wound.  Do NOT use Neosporin ointment.     4) Cover the wound with a band-aid, or a sterile non-stick gauze pad and micropore paper tape      REPEAT THESE INSTRUCTIONS AT LEAST ONCE A DAY UNTIL THE WOUND HAS COMPLETELY HEALED.    It is an old wives tale that a wound heals better when it is exposed to air and allowed to dry out. The wound will heal faster with a better cosmetic result if it is kept moist with ointment and covered with a bandage.    **Do not let the wound dry out.**    Supplies Needed:      *Cotton tipped applicators (Q-tips)    *Polysporin Ointment or Bacitracin Ointment (NOT NEOSPORIN)    *Band-aids or non-stick gauze pads and micropore paper tape.      PATIENT INFORMATION:    During the healing process you will notice a number of changes. All wounds develop a small halo of redness surrounding the wound.  This means healing is occurring. Severe itching with extensive redness usually indicates sensitivity to the ointment or bandage tape used to dress the wound.  You should call our office if this develops.      Swelling  and/or discoloration around your surgical site is common, particularly when performed around the eye.    All wounds normally drain.  The larger the wound the more drainage there will be.  After 7-10 days, you will notice the wound beginning to shrink and new skin will begin to grow.  The wound is healed when you can see skin has formed over the entire area.  A healed wound has a healthy, shiny look to the surface and is red to dark pink in color to  normalize.  Wounds may take approximately 4-6 weeks to heal.  Larger wounds may take 6-8 weeks.  After the wound is healed you may discontinue dressing changes.    You may experience a sensation of tightness as your wound heals. This is normal and will gradually subside.    Your healed wound may be sensitive to temperature changes. This sensitivity improves with time, but if you re having a lot of discomfort, try to avoid temperature extremes.    Patients frequently experience itching after their wound appears to have healed because of the continue healing under the skin.  Plain Vaseline will help relieve the itching.    POSSIBLE COMPLICATIONS    BLEEDIN. Leave the bandage in place.  2. Use tightly rolled up gauze or a cloth to apply direct pressure over the bandage for 30  minutes.  3. Reapply pressure for an additional 30 minutes if necessary  4. Use additional gauze and tape to maintain pressure once the bleeding has stopped.      We will notify you of your results in 1-2 business days.

## 2021-10-13 NOTE — NURSING NOTE
Chief Complaint   Patient presents with     Derm Problem       Vitals:    10/13/21 0938   BP: 119/84   Pulse: 64   SpO2: 98%     Wt Readings from Last 1 Encounters:   10/04/21 83.9 kg (185 lb)       Evelyn Barrett LPN.................10/13/2021

## 2021-10-15 ENCOUNTER — TELEPHONE (OUTPATIENT)
Dept: SURGERY | Facility: CLINIC | Age: 56
End: 2021-10-15

## 2021-10-15 DIAGNOSIS — K80.20 CALCULUS OF GALLBLADDER WITHOUT CHOLECYSTITIS WITHOUT OBSTRUCTION: Primary | ICD-10-CM

## 2021-10-15 NOTE — TELEPHONE ENCOUNTER
Reason for Call:  Other call back    Detailed comments: Pt calling to schedule gallbladder surgery. Pt states she has had consult with Dr. Santana    Phone Number Patient can be reached at: Home number on file [phone] (home)    Best Time: any    Can we leave a detailed message on this number? YES    Call taken on 10/15/2021 at 11:55 AM by Eduardo Cerrato

## 2021-10-26 ENCOUNTER — OFFICE VISIT (OUTPATIENT)
Dept: FAMILY MEDICINE | Facility: CLINIC | Age: 56
End: 2021-10-26
Payer: COMMERCIAL

## 2021-10-26 VITALS
BODY MASS INDEX: 36.29 KG/M2 | SYSTOLIC BLOOD PRESSURE: 128 MMHG | TEMPERATURE: 98.6 F | RESPIRATION RATE: 18 BRPM | WEIGHT: 192.2 LBS | HEART RATE: 78 BPM | HEIGHT: 61 IN | DIASTOLIC BLOOD PRESSURE: 86 MMHG | OXYGEN SATURATION: 98 %

## 2021-10-26 DIAGNOSIS — K80.21 CALCULUS OF GALLBLADDER WITH BILIARY OBSTRUCTION BUT WITHOUT CHOLECYSTITIS: ICD-10-CM

## 2021-10-26 DIAGNOSIS — N60.01 BREAST CYST, RIGHT: ICD-10-CM

## 2021-10-26 DIAGNOSIS — Z01.818 PREOP GENERAL PHYSICAL EXAM: Primary | ICD-10-CM

## 2021-10-26 PROCEDURE — 99214 OFFICE O/P EST MOD 30 MIN: CPT | Performed by: NURSE PRACTITIONER

## 2021-10-26 RX ORDER — LATANOPROST 50 UG/ML
SOLUTION/ DROPS OPHTHALMIC
COMMUNITY
Start: 2021-10-20

## 2021-10-26 ASSESSMENT — MIFFLIN-ST. JEOR: SCORE: 1409.19

## 2021-10-26 NOTE — PATIENT INSTRUCTIONS

## 2021-10-26 NOTE — H&P (VIEW-ONLY)
[facility]  [address] [facility] [address]  Phone: [phone]  Primary Provider: No Ref-Primary, Physician  Pre-op Performing Provider: PINEDA SALDANA      PREOPERATIVE EVALUATION:  Today's date: 10/26/2021    Shae Pierre is a 56 year old female who presents for a preoperative evaluation.    Surgical Information:  Surgery/Procedure: cholecystectomy, laparoscopic, and excision of skin lesion R breast  Surgery Location: [facility]   Surgeon: Dr. Santana  Surgery Date: 11/03/21  Time of Surgery: 0730  Where patient plans to recover: At home with family  Fax number for surgical facility: Note does not need to be faxed, will be available electronically in Epic.    Type of Anesthesia Anticipated: General    Assessment & Plan     The proposed surgical procedure is considered INTERMEDIATE risk.    Preop general physical exam      Calculus of gallbladder with biliary obstruction but without cholecystitis       Breast cyst, right    Medication Instructions:  Patient is on no chronic medications    RECOMMENDATION:  APPROVAL GIVEN to proceed with proposed procedure, without further diagnostic evaluation.          Subjective     HPI related to upcoming procedure: Found to have a large stone in gallbladder without cholecystitis, cyst on right breast    Preop Questions 10/26/2021   1. Have you ever had a heart attack or stroke? No   2. Have you ever had surgery on your heart or blood vessels, such as a stent placement, a coronary artery bypass, or surgery on an artery in your head, neck, heart, or legs? No   3. Do you have chest pain with activity? No   4. Do you have a history of  heart failure? No   5. Do you currently have a cold, bronchitis or symptoms of other infection? No   6. Do you have a cough, shortness of breath, or wheezing? No   7. Do you or anyone in your family have previous history of blood clots? UNKNOWN - mother had bypass surgery, patient unsure if it was related to  blood clots   8. Do you or does anyone in your family have a serious bleeding problem such as prolonged bleeding following surgeries or cuts? No   9. Have you ever had problems with anemia or been told to take iron pills? No   10. Have you had any abnormal blood loss such as black, tarry or bloody stools, or abnormal vaginal bleeding? No   11. Have you ever had a blood transfusion? No   12. Are you willing to have a blood transfusion if it is medically needed before, during, or after your surgery? Yes   13. Have you or any of your relatives ever had problems with anesthesia? YES - personal hx of severe nausea    14. Do you have sleep apnea, excessive snoring or daytime drowsiness? No   15. Do you have any artifical heart valves or other implanted medical devices like a pacemaker, defibrillator, or continuous glucose monitor? No   16. Do you have artificial joints? No   17. Are you allergic to latex? No   18. Is there any chance that you may be pregnant? No       Health Care Directive:  Patient does not have a Health Care Directive or Living Will: Patient states has Advance Directive and will bring in a copy to clinic.    Preoperative Review of :   reviewed - controlled substances reflected in medication list.    PDMP Review       Value Time User    State PDMP site checked  Yes 10/26/2021  3:14 PM Zakiya Rosa APRN CNP            Status of Chronic Conditions:  See problem list for active medical problems.  Problems all longstanding and stable, except as noted/documented.  See ROS for pertinent symptoms related to these conditions.      Review of Systems  CONSTITUTIONAL: NEGATIVE for fever, chills, change in weight  INTEGUMENTARY/SKIN: NEGATIVE for worrisome rashes, moles or lesions  EYES: NEGATIVE for vision changes or irritation  ENT/MOUTH: NEGATIVE for ear, mouth and throat problems  RESP: NEGATIVE for significant cough or SOB  CV: NEGATIVE for chest pain, palpitations or peripheral edema  GI:  POSITIVE for nausea and NEGATIVE for diarrhea, dysphagia, gas or bloating, poor appetite and vomiting  : NEGATIVE for frequency, dysuria, or hematuria  MUSCULOSKELETAL: NEGATIVE for significant arthralgias or myalgia  NEURO: NEGATIVE for weakness, dizziness or paresthesias  ENDOCRINE: NEGATIVE for temperature intolerance, skin/hair changes  HEME: NEGATIVE for bleeding problems  PSYCHIATRIC: NEGATIVE for changes in mood or affect    Patient Active Problem List    Diagnosis Date Noted     H/O total hysterectomy 06/30/2021     Priority: Medium     Benign pathology       Chronic pain of left knee 04/09/2021     Priority: Medium     Urge incontinence of urine 12/03/2015     Priority: Medium     Breast cyst 06/15/2012     Priority: Medium     Migraine headache 01/23/2012     Priority: Medium     CARDIOVASCULAR SCREENING; LDL GOAL LESS THAN 160 10/31/2010     Priority: Medium     BMI 34.0-34.9,adult 03/27/2006     Priority: Medium     Elevated blood pressure reading without diagnosis of hypertension 11/03/2005     Priority: Medium     Dermatophytosis of nail 11/03/2005     Priority: Medium      Past Medical History:   Diagnosis Date     Dysplasia of cervix (uteri) 1998     Fibroids 2/22/2012     Other abnormal Pap smear and cervical HPV (human papapillomavirus) 9/29/2008    EM>40 and pos HPV unknown type rec rpt pap 6 mos/11/08  1/26/2009 F/u pap was normal  Will have next pap in 1 year.      Other acne      PONV (postoperative nausea and vomiting)      Past Surgical History:   Procedure Laterality Date     COLONOSCOPY N/A 04/16/2018    Procedure: COLONOSCOPY;  Colonoscopy;  Surgeon: Chuck Donovan MD;  Location: [facility]     EXCISE MASS TRUNK  07/13/2012    Procedure: EXCISE MASS TRUNK;  Excisional Removal Chest Wall Cysts X2;  Surgeon: Dennys Henriquez MD;  Location: [facility]     HYSTERECTOMY, PAP NO LONGER INDICATED       LAPAROSCOPIC ASSISTED HYSTERECTOMY VAGINAL, BILATERAL SALPINGO-OOPHORECTOMY, COMBINED   "2012    [facility]     SURGICAL HISTORY OF -       LEEP     SURGICAL HISTORY OF -       Cryotherapy      SURGICAL HISTORY OF -       Colpo     SURGICAL HISTORY OF -  Left     meniscal injury     Current Outpatient Medications   Medication Sig Dispense Refill     ADVIL 200 MG OR TABS TAKE 1 TO 2 TABLETS EVERY 4 TO 6 HOURS AS NEEDED WITH FOOD 120 0     GLUCOSAMINE SULFATE PO Take 500 mg by mouth daily       latanoprost (XALATAN) 0.005 % ophthalmic solution Place 1 drop into each eye EVERY EVENING       MULTI-VITAMIN OR TABS 1 TABLET DAILY 30 0     Omega-3 Fatty Acids (FISH OIL) 435 MG CAPS        SUMAtriptan (IMITREX) 100 MG tablet Take 1 tablet (100 mg) by mouth at onset of headache for migraine May repeat in 2 hours prn: max 2/day 12 tablet 4       Allergies   Allergen Reactions     Codeine Other (See Comments) and Difficulty breathing     Throat closes     Morphine Other (See Comments) and Difficulty breathing     Throat closes     Sulfa Drugs Hives     Staples Other (See Comments) and Rash     Rash and infection after knee surgery        Social History     Tobacco Use     Smoking status: Former Smoker     Types: Cigarettes     Quit date: 2010     Years since quittin.5     Smokeless tobacco: Never Used   Substance Use Topics     Alcohol use: Yes     Comment: occ       History   Drug Use No         Objective     /86   Pulse 78   Temp 98.6  F (37  C) (Tympanic)   Resp 18   Ht 1.549 m (5' 1\")   Wt 87.2 kg (192 lb 3.2 oz)   LMP 2012   SpO2 98%   BMI 36.32 kg/m      Physical Exam    GENERAL APPEARANCE: healthy, alert and no distress     EYES: EOMI, PERRL     HENT: ear canals and TM's normal and nose and mouth without ulcers or lesions     NECK: no adenopathy, no asymmetry, masses, or scars and thyroid normal to palpation     RESP: lungs clear to auscultation - no rales, rhonchi or wheezes     CV: regular rates and rhythm, normal S1 S2, no S3 or S4 and no murmur, click or rub     ABDOMEN: "  soft, nontender, no HSM or masses and bowel sounds normal     MS: extremities normal- no gross deformities noted, no evidence of inflammation in joints, FROM in all extremities.     SKIN: no suspicious lesions or rashes     NEURO: Normal strength and tone, sensory exam grossly normal, mentation intact and speech normal     PSYCH: mentation appears normal. and affect normal/bright     LYMPHATICS: No cervical adenopathy    Recent Labs   Lab Test 09/27/21  1253 04/19/21  1531   HGB 15.1 14.1    357    136   POTASSIUM 3.8 3.6   CR 0.64 0.66        Diagnostics:  No labs were ordered during this visit.   No EKG required, no history of coronary heart disease, significant arrhythmia, peripheral arterial disease or other structural heart disease.    Revised Cardiac Risk Index (RCRI):  The patient has the following serious cardiovascular risks for perioperative complications:   - No serious cardiac risks = 0 points     RCRI Interpretation: 0 points: Class I (very low risk - 0.4% complication rate)           Signed Electronically by: STEFANIE Bates CNP  Copy of this evaluation report is provided to requesting physician.

## 2021-10-26 NOTE — PROGRESS NOTES
[facility]  [address] [facility] [address]  Phone: [phone]  Primary Provider: No Ref-Primary, Physician  Pre-op Performing Provider: PINEDA SALDANA      PREOPERATIVE EVALUATION:  Today's date: 10/26/2021    Shae Pierre is a 56 year old female who presents for a preoperative evaluation.    Surgical Information:  Surgery/Procedure: cholecystectomy, laparoscopic, and excision of skin lesion R breast  Surgery Location: [facility]   Surgeon: Dr. Santana  Surgery Date: 11/03/21  Time of Surgery: 0730  Where patient plans to recover: At home with family  Fax number for surgical facility: Note does not need to be faxed, will be available electronically in Epic.    Type of Anesthesia Anticipated: General    Assessment & Plan     The proposed surgical procedure is considered INTERMEDIATE risk.    Preop general physical exam      Calculus of gallbladder with biliary obstruction but without cholecystitis       Breast cyst, right    Medication Instructions:  Patient is on no chronic medications    RECOMMENDATION:  APPROVAL GIVEN to proceed with proposed procedure, without further diagnostic evaluation.          Subjective     HPI related to upcoming procedure: Found to have a large stone in gallbladder without cholecystitis, cyst on right breast    Preop Questions 10/26/2021   1. Have you ever had a heart attack or stroke? No   2. Have you ever had surgery on your heart or blood vessels, such as a stent placement, a coronary artery bypass, or surgery on an artery in your head, neck, heart, or legs? No   3. Do you have chest pain with activity? No   4. Do you have a history of  heart failure? No   5. Do you currently have a cold, bronchitis or symptoms of other infection? No   6. Do you have a cough, shortness of breath, or wheezing? No   7. Do you or anyone in your family have previous history of blood clots? UNKNOWN - mother had bypass surgery, patient unsure if it was related to  blood clots   8. Do you or does anyone in your family have a serious bleeding problem such as prolonged bleeding following surgeries or cuts? No   9. Have you ever had problems with anemia or been told to take iron pills? No   10. Have you had any abnormal blood loss such as black, tarry or bloody stools, or abnormal vaginal bleeding? No   11. Have you ever had a blood transfusion? No   12. Are you willing to have a blood transfusion if it is medically needed before, during, or after your surgery? Yes   13. Have you or any of your relatives ever had problems with anesthesia? YES - personal hx of severe nausea    14. Do you have sleep apnea, excessive snoring or daytime drowsiness? No   15. Do you have any artifical heart valves or other implanted medical devices like a pacemaker, defibrillator, or continuous glucose monitor? No   16. Do you have artificial joints? No   17. Are you allergic to latex? No   18. Is there any chance that you may be pregnant? No       Health Care Directive:  Patient does not have a Health Care Directive or Living Will: Patient states has Advance Directive and will bring in a copy to clinic.    Preoperative Review of :   reviewed - controlled substances reflected in medication list.    PDMP Review       Value Time User    State PDMP site checked  Yes 10/26/2021  3:14 PM Zakiya Rosa APRN CNP            Status of Chronic Conditions:  See problem list for active medical problems.  Problems all longstanding and stable, except as noted/documented.  See ROS for pertinent symptoms related to these conditions.      Review of Systems  CONSTITUTIONAL: NEGATIVE for fever, chills, change in weight  INTEGUMENTARY/SKIN: NEGATIVE for worrisome rashes, moles or lesions  EYES: NEGATIVE for vision changes or irritation  ENT/MOUTH: NEGATIVE for ear, mouth and throat problems  RESP: NEGATIVE for significant cough or SOB  CV: NEGATIVE for chest pain, palpitations or peripheral edema  GI:  POSITIVE for nausea and NEGATIVE for diarrhea, dysphagia, gas or bloating, poor appetite and vomiting  : NEGATIVE for frequency, dysuria, or hematuria  MUSCULOSKELETAL: NEGATIVE for significant arthralgias or myalgia  NEURO: NEGATIVE for weakness, dizziness or paresthesias  ENDOCRINE: NEGATIVE for temperature intolerance, skin/hair changes  HEME: NEGATIVE for bleeding problems  PSYCHIATRIC: NEGATIVE for changes in mood or affect    Patient Active Problem List    Diagnosis Date Noted     H/O total hysterectomy 06/30/2021     Priority: Medium     Benign pathology       Chronic pain of left knee 04/09/2021     Priority: Medium     Urge incontinence of urine 12/03/2015     Priority: Medium     Breast cyst 06/15/2012     Priority: Medium     Migraine headache 01/23/2012     Priority: Medium     CARDIOVASCULAR SCREENING; LDL GOAL LESS THAN 160 10/31/2010     Priority: Medium     BMI 34.0-34.9,adult 03/27/2006     Priority: Medium     Elevated blood pressure reading without diagnosis of hypertension 11/03/2005     Priority: Medium     Dermatophytosis of nail 11/03/2005     Priority: Medium      Past Medical History:   Diagnosis Date     Dysplasia of cervix (uteri) 1998     Fibroids 2/22/2012     Other abnormal Pap smear and cervical HPV (human papapillomavirus) 9/29/2008    EM>40 and pos HPV unknown type rec rpt pap 6 mos/11/08  1/26/2009 F/u pap was normal  Will have next pap in 1 year.      Other acne      PONV (postoperative nausea and vomiting)      Past Surgical History:   Procedure Laterality Date     COLONOSCOPY N/A 04/16/2018    Procedure: COLONOSCOPY;  Colonoscopy;  Surgeon: Chuck Donovan MD;  Location: [facility]     EXCISE MASS TRUNK  07/13/2012    Procedure: EXCISE MASS TRUNK;  Excisional Removal Chest Wall Cysts X2;  Surgeon: Dennys Henriquez MD;  Location: [facility]     HYSTERECTOMY, PAP NO LONGER INDICATED       LAPAROSCOPIC ASSISTED HYSTERECTOMY VAGINAL, BILATERAL SALPINGO-OOPHORECTOMY, COMBINED   "2012    [facility]     SURGICAL HISTORY OF -       LEEP     SURGICAL HISTORY OF -       Cryotherapy      SURGICAL HISTORY OF -       Colpo     SURGICAL HISTORY OF -  Left     meniscal injury     Current Outpatient Medications   Medication Sig Dispense Refill     ADVIL 200 MG OR TABS TAKE 1 TO 2 TABLETS EVERY 4 TO 6 HOURS AS NEEDED WITH FOOD 120 0     GLUCOSAMINE SULFATE PO Take 500 mg by mouth daily       latanoprost (XALATAN) 0.005 % ophthalmic solution Place 1 drop into each eye EVERY EVENING       MULTI-VITAMIN OR TABS 1 TABLET DAILY 30 0     Omega-3 Fatty Acids (FISH OIL) 435 MG CAPS        SUMAtriptan (IMITREX) 100 MG tablet Take 1 tablet (100 mg) by mouth at onset of headache for migraine May repeat in 2 hours prn: max 2/day 12 tablet 4       Allergies   Allergen Reactions     Codeine Other (See Comments) and Difficulty breathing     Throat closes     Morphine Other (See Comments) and Difficulty breathing     Throat closes     Sulfa Drugs Hives     Staples Other (See Comments) and Rash     Rash and infection after knee surgery        Social History     Tobacco Use     Smoking status: Former Smoker     Types: Cigarettes     Quit date: 2010     Years since quittin.5     Smokeless tobacco: Never Used   Substance Use Topics     Alcohol use: Yes     Comment: occ       History   Drug Use No         Objective     /86   Pulse 78   Temp 98.6  F (37  C) (Tympanic)   Resp 18   Ht 1.549 m (5' 1\")   Wt 87.2 kg (192 lb 3.2 oz)   LMP 2012   SpO2 98%   BMI 36.32 kg/m      Physical Exam    GENERAL APPEARANCE: healthy, alert and no distress     EYES: EOMI, PERRL     HENT: ear canals and TM's normal and nose and mouth without ulcers or lesions     NECK: no adenopathy, no asymmetry, masses, or scars and thyroid normal to palpation     RESP: lungs clear to auscultation - no rales, rhonchi or wheezes     CV: regular rates and rhythm, normal S1 S2, no S3 or S4 and no murmur, click or rub     ABDOMEN: "  soft, nontender, no HSM or masses and bowel sounds normal     MS: extremities normal- no gross deformities noted, no evidence of inflammation in joints, FROM in all extremities.     SKIN: no suspicious lesions or rashes     NEURO: Normal strength and tone, sensory exam grossly normal, mentation intact and speech normal     PSYCH: mentation appears normal. and affect normal/bright     LYMPHATICS: No cervical adenopathy    Recent Labs   Lab Test 09/27/21  1253 04/19/21  1531   HGB 15.1 14.1    357    136   POTASSIUM 3.8 3.6   CR 0.64 0.66        Diagnostics:  No labs were ordered during this visit.   No EKG required, no history of coronary heart disease, significant arrhythmia, peripheral arterial disease or other structural heart disease.    Revised Cardiac Risk Index (RCRI):  The patient has the following serious cardiovascular risks for perioperative complications:   - No serious cardiac risks = 0 points     RCRI Interpretation: 0 points: Class I (very low risk - 0.4% complication rate)           Signed Electronically by: STEFANIE Bates CNP  Copy of this evaluation report is provided to requesting physician.

## 2021-10-28 DIAGNOSIS — G43.109 MIGRAINE WITH AURA AND WITHOUT STATUS MIGRAINOSUS, NOT INTRACTABLE: ICD-10-CM

## 2021-10-28 RX ORDER — SUMATRIPTAN 100 MG/1
100 TABLET, FILM COATED ORAL
Qty: 12 TABLET | Refills: 4 | Status: SHIPPED | OUTPATIENT
Start: 2021-10-28 | End: 2024-02-15

## 2021-10-28 NOTE — TELEPHONE ENCOUNTER
Last Written Prescription Date:  11/5/2018  Last Fill Quantity: 12,  # refills: 4   Last office visit: Visit date not found with prescribing provider: 2018 Dr Jorgensen  Future Office Visit: none pending  Next 5 appointments (look out 90 days)    Nov 01, 2021  2:30 PM  Pre-procedure Covid with [facility] COVID LAB  [facility] ([facility] ) [address] [place] [id number] [phone]               Requested Prescriptions   Pending Prescriptions Disp Refills     SUMAtriptan (IMITREX) 100 MG tablet 12 tablet 4     Sig: Take 1 tablet (100 mg) by mouth at onset of headache for migraine May repeat in 2 hours prn: max 2/day       There is no refill protocol information for this order        Does patient need to make an appointmen or should this be routed to family provider.    Dodie Yepez RN on 10/28/2021 at 1:02 PM

## 2021-11-01 ENCOUNTER — LAB (OUTPATIENT)
Dept: LAB | Facility: CLINIC | Age: 56
End: 2021-11-01
Attending: SURGERY
Payer: COMMERCIAL

## 2021-11-01 ENCOUNTER — TELEPHONE (OUTPATIENT)
Dept: SURGERY | Facility: CLINIC | Age: 56
End: 2021-11-01

## 2021-11-01 DIAGNOSIS — K80.21 CALCULUS OF GALLBLADDER WITH BILIARY OBSTRUCTION BUT WITHOUT CHOLECYSTITIS: ICD-10-CM

## 2021-11-01 PROCEDURE — U0003 INFECTIOUS AGENT DETECTION BY NUCLEIC ACID (DNA OR RNA); SEVERE ACUTE RESPIRATORY SYNDROME CORONAVIRUS 2 (SARS-COV-2) (CORONAVIRUS DISEASE [COVID-19]), AMPLIFIED PROBE TECHNIQUE, MAKING USE OF HIGH THROUGHPUT TECHNOLOGIES AS DESCRIBED BY CMS-2020-01-R: HCPCS

## 2021-11-01 PROCEDURE — U0005 INFEC AGEN DETEC AMPLI PROBE: HCPCS

## 2021-11-01 NOTE — OR NURSING
Pt calls with cold s/s. Getting covid test today at 1430. Will cancel if covid positive or if pt has worsening symptoms, breathing issues or a fever. Dos will be assessed by  and juan carlos pt aware. Will call with further questions. Or manager Kezia Moulton RN aware of pt condition preop.,

## 2021-11-01 NOTE — TELEPHONE ENCOUNTER
Please see note below and advise if pt should reschedule or keep current surgery time for Wednesday.  Claribel DAVIS RN BSN PHN  Specialty Clinics

## 2021-11-01 NOTE — TELEPHONE ENCOUNTER
Reason for Call:  Other call back    Detailed comments: Pt has surgery Wed.  Has standard cold - running nose, cough, watery eyes, home test last Friday covid was negative.  Has pre-procedure labs for today but because she has symptoms she shouldn't have this test done as pre-procedure.  Wondering if she needs to reschedule?    Phone Number Patient can be reached at: Home number on file [phone] (home)    Best Time:     Can we leave a detailed message on this number? YES    Call taken on 11/1/2021 at 8:24 AM by Shaneka Yo

## 2021-11-02 DIAGNOSIS — Z11.59 ENCOUNTER FOR SCREENING FOR OTHER VIRAL DISEASES: ICD-10-CM

## 2021-11-02 LAB — SARS-COV-2 RNA RESP QL NAA+PROBE: NEGATIVE

## 2021-11-02 NOTE — TELEPHONE ENCOUNTER
Please call her back tomorrow and if she is still feeling ill, I would reschedule her.  This is a purely elective procedure.     Thanks    Message text

## 2021-11-15 ENCOUNTER — ANESTHESIA EVENT (OUTPATIENT)
Dept: SURGERY | Facility: CLINIC | Age: 56
End: 2021-11-15
Payer: COMMERCIAL

## 2021-11-15 ENCOUNTER — LAB (OUTPATIENT)
Dept: LAB | Facility: CLINIC | Age: 56
End: 2021-11-15
Payer: COMMERCIAL

## 2021-11-15 DIAGNOSIS — Z11.59 ENCOUNTER FOR SCREENING FOR OTHER VIRAL DISEASES: ICD-10-CM

## 2021-11-15 PROCEDURE — U0003 INFECTIOUS AGENT DETECTION BY NUCLEIC ACID (DNA OR RNA); SEVERE ACUTE RESPIRATORY SYNDROME CORONAVIRUS 2 (SARS-COV-2) (CORONAVIRUS DISEASE [COVID-19]), AMPLIFIED PROBE TECHNIQUE, MAKING USE OF HIGH THROUGHPUT TECHNOLOGIES AS DESCRIBED BY CMS-2020-01-R: HCPCS

## 2021-11-15 PROCEDURE — U0005 INFEC AGEN DETEC AMPLI PROBE: HCPCS

## 2021-11-15 ASSESSMENT — LIFESTYLE VARIABLES: TOBACCO_USE: 1

## 2021-11-16 LAB — SARS-COV-2 RNA RESP QL NAA+PROBE: NEGATIVE

## 2021-11-16 NOTE — ANESTHESIA PREPROCEDURE EVALUATION
Anesthesia Pre-Procedure Evaluation    Patient: Shae Pierre   MRN: [id number] : 1965        Preoperative Diagnosis: Calculus of gallbladder without cholecystitis without obstruction [K80.20]    Procedure : Procedure(s):  CHOLECYSTECTOMY, LAPAROSCOPIC, AND EXCISION OF SKIN LESION RIGHT BREAST          Past Medical History:   Diagnosis Date     Dysplasia of cervix (uteri) 1998     Fibroids 2012     Other abnormal Pap smear and cervical HPV (human papapillomavirus) 2008    EM>40 and pos HPV unknown type rec rpt pap 6 mos/11/08  2009 F/u pap was normal  Will have next pap in 1 year.      Other acne      PONV (postoperative nausea and vomiting)       Past Surgical History:   Procedure Laterality Date     COLONOSCOPY N/A 2018    Procedure: COLONOSCOPY;  Colonoscopy;  Surgeon: Chuck Donovan MD;  Location: [facility]     EXCISE MASS TRUNK  2012    Procedure: EXCISE MASS TRUNK;  Excisional Removal Chest Wall Cysts X2;  Surgeon: Dennys Henriquez MD;  Location: [facility]     HYSTERECTOMY, PAP NO LONGER INDICATED       LAPAROSCOPIC ASSISTED HYSTERECTOMY VAGINAL, BILATERAL SALPINGO-OOPHORECTOMY, COMBINED  2012    LAVH-BSO     SURGICAL HISTORY OF -       LEEP     SURGICAL HISTORY OF -       Cryotherapy      SURGICAL HISTORY OF -       Colpo     SURGICAL HISTORY OF -  Left     meniscal injury      Allergies   Allergen Reactions     Codeine Other (See Comments) and Difficulty breathing     Throat closes     Morphine Other (See Comments) and Difficulty breathing     Throat closes     Sulfa Drugs Hives     Staples Other (See Comments) and Rash     Rash and infection after knee surgery      Social History     Tobacco Use     Smoking status: Former Smoker     Types: Cigarettes     Quit date: 2010     Years since quittin.6     Smokeless tobacco: Never Used   Substance Use Topics     Alcohol use: Yes     Comment: occ      Wt Readings from Last 1 Encounters:   10/26/21 87.2 kg (192 lb  3.2 oz)        Anesthesia Evaluation   Pt has had prior anesthetic. Type: General, MAC and Regional.    History of anesthetic complications  - PONV.      ROS/MED HX  ENT/Pulmonary:     (+) UVALDO risk factors, obese, tobacco use, Past use,     Neurologic:     (+) migraines,     Cardiovascular:     (+) hypertension-----    METS/Exercise Tolerance:     Hematologic:  - neg hematologic  ROS     Musculoskeletal:   (+) arthritis,     GI/Hepatic:  - neg GI/hepatic ROS     Renal/Genitourinary:  - neg Renal ROS     Endo: Comment: Morbid obesity    (+) Obesity,     Psychiatric/Substance Use:  - neg psychiatric ROS     Infectious Disease:  - neg infectious disease ROS     Malignancy:  - neg malignancy ROS     Other:  - neg other ROS          Physical Exam    Airway  airway exam normal      Mallampati: II       Respiratory Devices and Support         Dental  no notable dental history         Cardiovascular   cardiovascular exam normal          Pulmonary   pulmonary exam normal                OUTSIDE LABS:  CBC:   Lab Results   Component Value Date    WBC 12.3 (H) 09/27/2021    WBC 10.7 04/19/2021    HGB 15.1 09/27/2021    HGB 14.1 04/19/2021    HCT 44.5 09/27/2021    HCT 43.0 04/19/2021     09/27/2021     04/19/2021     BMP:   Lab Results   Component Value Date     09/27/2021     04/19/2021    POTASSIUM 3.8 09/27/2021    POTASSIUM 3.6 04/19/2021    CHLORIDE 107 09/27/2021    CHLORIDE 103 04/19/2021    CO2 25 09/27/2021    CO2 29 04/19/2021    BUN 8 09/27/2021    BUN 9 04/19/2021    CR 0.64 09/27/2021    CR 0.66 04/19/2021    GLC 91 09/27/2021    GLC 92 04/19/2021     COAGS: No results found for: PTT, INR, FIBR  POC:   Lab Results   Component Value Date    HCG Negative 04/03/2012     HEPATIC:   Lab Results   Component Value Date    ALBUMIN 3.4 09/27/2021    PROTTOTAL 7.3 09/27/2021    ALT 35 09/27/2021    AST 19 09/27/2021    ALKPHOS 113 09/27/2021    BILITOTAL 0.5 09/27/2021     OTHER:   Lab Results    Component Value Date    LACT 1.1 09/27/2021    A1C 5.6 12/31/2012    BREANNA 8.7 09/27/2021    LIPASE 140 09/27/2021    TSH 2.75 05/23/2017    SED 30 (H) 06/03/2008       Anesthesia Plan    ASA Status:  3   NPO Status:  NPO Appropriate    Anesthesia Type: General.     - Airway: ETT   Induction: Intravenous.   Maintenance: Balanced.        Consents    Anesthesia Plan(s) and associated risks, benefits, and realistic alternatives discussed. Questions answered and patient/representative(s) expressed understanding.    - Discussed:     - Discussed with:  Patient         Postoperative Care    Pain management: IV analgesics, Oral pain medications, Multi-modal analgesia.   PONV prophylaxis: Ondansetron (or other 5HT-3), Dexamethasone or Solumedrol, Scopolamine patch, Background Propofol Infusion     Comments:                Hugo Hebert CRNA, APRN [facility]

## 2021-11-17 ENCOUNTER — HOSPITAL ENCOUNTER (OUTPATIENT)
Facility: CLINIC | Age: 56
Discharge: HOME | End: 2021-11-17
Attending: SURGERY | Admitting: SURGERY
Payer: COMMERCIAL

## 2021-11-17 ENCOUNTER — APPOINTMENT (OUTPATIENT)
Dept: GENERAL RADIOLOGY | Facility: CLINIC | Age: 56
End: 2021-11-17
Attending: EMERGENCY MEDICINE
Payer: COMMERCIAL

## 2021-11-17 ENCOUNTER — ANESTHESIA (OUTPATIENT)
Dept: SURGERY | Facility: CLINIC | Age: 56
End: 2021-11-17
Payer: COMMERCIAL

## 2021-11-17 ENCOUNTER — NURSE TRIAGE (OUTPATIENT)
Dept: NURSING | Facility: CLINIC | Age: 56
End: 2021-11-17

## 2021-11-17 ENCOUNTER — HOSPITAL ENCOUNTER (EMERGENCY)
Facility: CLINIC | Age: 56
Discharge: HOME | End: 2021-11-18
Attending: EMERGENCY MEDICINE | Admitting: EMERGENCY MEDICINE
Payer: COMMERCIAL

## 2021-11-17 ENCOUNTER — TELEPHONE (OUTPATIENT)
Dept: SURGERY | Facility: CLINIC | Age: 56
End: 2021-11-17

## 2021-11-17 VITALS
WEIGHT: 192 LBS | SYSTOLIC BLOOD PRESSURE: 119 MMHG | HEIGHT: 61 IN | TEMPERATURE: 97.7 F | HEART RATE: 75 BPM | OXYGEN SATURATION: 97 % | BODY MASS INDEX: 36.25 KG/M2 | DIASTOLIC BLOOD PRESSURE: 72 MMHG | RESPIRATION RATE: 18 BRPM

## 2021-11-17 DIAGNOSIS — K80.20 CALCULUS OF GALLBLADDER WITHOUT CHOLECYSTITIS WITHOUT OBSTRUCTION: ICD-10-CM

## 2021-11-17 DIAGNOSIS — R06.02 SHORTNESS OF BREATH: ICD-10-CM

## 2021-11-17 PROCEDURE — 250N000013 HC RX MED GY IP 250 OP 250 PS 637: Performed by: NURSE ANESTHETIST, CERTIFIED REGISTERED

## 2021-11-17 PROCEDURE — 250N000009 HC RX 250: Performed by: NURSE ANESTHETIST, CERTIFIED REGISTERED

## 2021-11-17 PROCEDURE — 999N000141 HC STATISTIC PRE-PROCEDURE NURSING ASSESSMENT: Performed by: SURGERY

## 2021-11-17 PROCEDURE — 88304 TISSUE EXAM BY PATHOLOGIST: CPT | Mod: 26 | Performed by: PATHOLOGY

## 2021-11-17 PROCEDURE — 258N000003 HC RX IP 258 OP 636: Performed by: NURSE ANESTHETIST, CERTIFIED REGISTERED

## 2021-11-17 PROCEDURE — 93005 ELECTROCARDIOGRAM TRACING: CPT

## 2021-11-17 PROCEDURE — 250N000011 HC RX IP 250 OP 636: Performed by: SURGERY

## 2021-11-17 PROCEDURE — 47562 LAPAROSCOPIC CHOLECYSTECTOMY: CPT | Performed by: SURGERY

## 2021-11-17 PROCEDURE — 250N000011 HC RX IP 250 OP 636: Performed by: EMERGENCY MEDICINE

## 2021-11-17 PROCEDURE — 360N000076 HC SURGERY LEVEL 3, PER MIN: Performed by: SURGERY

## 2021-11-17 PROCEDURE — 272N000001 HC OR GENERAL SUPPLY STERILE: Performed by: SURGERY

## 2021-11-17 PROCEDURE — 250N000011 HC RX IP 250 OP 636: Performed by: NURSE ANESTHETIST, CERTIFIED REGISTERED

## 2021-11-17 PROCEDURE — 99284 EMERGENCY DEPT VISIT MOD MDM: CPT | Mod: 25

## 2021-11-17 PROCEDURE — 250N000025 HC SEVOFLURANE, PER MIN: Performed by: SURGERY

## 2021-11-17 PROCEDURE — 370N000017 HC ANESTHESIA TECHNICAL FEE, PER MIN: Performed by: SURGERY

## 2021-11-17 PROCEDURE — 71046 X-RAY EXAM CHEST 2 VIEWS: CPT

## 2021-11-17 PROCEDURE — 710N000010 HC RECOVERY PHASE 1, LEVEL 2, PER MIN: Performed by: SURGERY

## 2021-11-17 PROCEDURE — 88304 TISSUE EXAM BY PATHOLOGIST: CPT | Mod: TC | Performed by: SURGERY

## 2021-11-17 PROCEDURE — 710N000012 HC RECOVERY PHASE 2, PER MINUTE: Performed by: SURGERY

## 2021-11-17 PROCEDURE — 250N000009 HC RX 250: Performed by: SURGERY

## 2021-11-17 PROCEDURE — 99284 EMERGENCY DEPT VISIT MOD MDM: CPT | Performed by: EMERGENCY MEDICINE

## 2021-11-17 PROCEDURE — 250N000013 HC RX MED GY IP 250 OP 250 PS 637: Performed by: EMERGENCY MEDICINE

## 2021-11-17 RX ORDER — DEXAMETHASONE SODIUM PHOSPHATE 4 MG/ML
INJECTION, SOLUTION INTRA-ARTICULAR; INTRALESIONAL; INTRAMUSCULAR; INTRAVENOUS; SOFT TISSUE PRN
Status: DISCONTINUED | OUTPATIENT
Start: 2021-11-17 | End: 2021-11-17

## 2021-11-17 RX ORDER — INDOCYANINE GREEN AND WATER 25 MG
2.5 KIT INJECTION ONCE
Status: COMPLETED | OUTPATIENT
Start: 2021-11-17 | End: 2021-11-17

## 2021-11-17 RX ORDER — FENTANYL CITRATE 50 UG/ML
50 INJECTION, SOLUTION INTRAMUSCULAR; INTRAVENOUS EVERY 5 MIN PRN
Status: DISCONTINUED | OUTPATIENT
Start: 2021-11-17 | End: 2021-11-17 | Stop reason: HOSPADM

## 2021-11-17 RX ORDER — HYDROXYZINE HYDROCHLORIDE 25 MG/1
25 TABLET, FILM COATED ORAL EVERY 6 HOURS PRN
Status: DISCONTINUED | OUTPATIENT
Start: 2021-11-17 | End: 2021-11-17 | Stop reason: HOSPADM

## 2021-11-17 RX ORDER — KETOROLAC TROMETHAMINE 30 MG/ML
INJECTION, SOLUTION INTRAMUSCULAR; INTRAVENOUS PRN
Status: DISCONTINUED | OUTPATIENT
Start: 2021-11-17 | End: 2021-11-17

## 2021-11-17 RX ORDER — GLYCOPYRROLATE 0.2 MG/ML
INJECTION, SOLUTION INTRAMUSCULAR; INTRAVENOUS PRN
Status: DISCONTINUED | OUTPATIENT
Start: 2021-11-17 | End: 2021-11-17

## 2021-11-17 RX ORDER — HYDROMORPHONE HCL IN WATER/PF 6 MG/30 ML
0.4 PATIENT CONTROLLED ANALGESIA SYRINGE INTRAVENOUS EVERY 5 MIN PRN
Status: DISCONTINUED | OUTPATIENT
Start: 2021-11-17 | End: 2021-11-17 | Stop reason: HOSPADM

## 2021-11-17 RX ORDER — LIDOCAINE 40 MG/G
CREAM TOPICAL
Status: DISCONTINUED | OUTPATIENT
Start: 2021-11-17 | End: 2021-11-17 | Stop reason: HOSPADM

## 2021-11-17 RX ORDER — SCOLOPAMINE TRANSDERMAL SYSTEM 1 MG/1
1 PATCH, EXTENDED RELEASE TRANSDERMAL ONCE
Status: DISCONTINUED | OUTPATIENT
Start: 2021-11-17 | End: 2021-11-17 | Stop reason: HOSPADM

## 2021-11-17 RX ORDER — ONDANSETRON 4 MG/1
4 TABLET, ORALLY DISINTEGRATING ORAL EVERY 6 HOURS PRN
Qty: 10 TABLET | Refills: 1 | Status: SHIPPED | OUTPATIENT
Start: 2021-11-17 | End: 2021-12-03

## 2021-11-17 RX ORDER — DOCUSATE SODIUM 100 MG/1
100 CAPSULE, LIQUID FILLED ORAL 2 TIMES DAILY
Qty: 60 CAPSULE | Refills: 1 | Status: SHIPPED | OUTPATIENT
Start: 2021-11-17 | End: 2023-06-01

## 2021-11-17 RX ORDER — HYDROCODONE BITARTRATE AND ACETAMINOPHEN 5; 325 MG/1; MG/1
1-2 TABLET ORAL EVERY 4 HOURS PRN
Status: DISCONTINUED | OUTPATIENT
Start: 2021-11-17 | End: 2021-11-17 | Stop reason: HOSPADM

## 2021-11-17 RX ORDER — ONDANSETRON 4 MG/1
4 TABLET, ORALLY DISINTEGRATING ORAL ONCE
Status: COMPLETED | OUTPATIENT
Start: 2021-11-17 | End: 2021-11-17

## 2021-11-17 RX ORDER — HYDROCODONE BITARTRATE AND ACETAMINOPHEN 5; 325 MG/1; MG/1
2 TABLET ORAL ONCE
Status: COMPLETED | OUTPATIENT
Start: 2021-11-17 | End: 2021-11-17

## 2021-11-17 RX ORDER — SODIUM CHLORIDE, SODIUM LACTATE, POTASSIUM CHLORIDE, CALCIUM CHLORIDE 600; 310; 30; 20 MG/100ML; MG/100ML; MG/100ML; MG/100ML
INJECTION, SOLUTION INTRAVENOUS CONTINUOUS
Status: DISCONTINUED | OUTPATIENT
Start: 2021-11-17 | End: 2021-11-17 | Stop reason: HOSPADM

## 2021-11-17 RX ORDER — HYDROCODONE BITARTRATE AND ACETAMINOPHEN 5; 325 MG/1; MG/1
1-2 TABLET ORAL EVERY 4 HOURS PRN
Qty: 12 TABLET | Refills: 0 | Status: SHIPPED | OUTPATIENT
Start: 2021-11-17 | End: 2021-11-20

## 2021-11-17 RX ORDER — PROPOFOL 10 MG/ML
INJECTION, EMULSION INTRAVENOUS CONTINUOUS PRN
Status: DISCONTINUED | OUTPATIENT
Start: 2021-11-17 | End: 2021-11-17

## 2021-11-17 RX ORDER — GABAPENTIN 300 MG/1
300 CAPSULE ORAL
Status: COMPLETED | OUTPATIENT
Start: 2021-11-17 | End: 2021-11-17

## 2021-11-17 RX ORDER — BUPIVACAINE HYDROCHLORIDE AND EPINEPHRINE 5; 5 MG/ML; UG/ML
INJECTION, SOLUTION PERINEURAL PRN
Status: DISCONTINUED | OUTPATIENT
Start: 2021-11-17 | End: 2021-11-17 | Stop reason: HOSPADM

## 2021-11-17 RX ORDER — ACETAMINOPHEN 325 MG/1
975 TABLET ORAL ONCE
Status: COMPLETED | OUTPATIENT
Start: 2021-11-17 | End: 2021-11-17

## 2021-11-17 RX ORDER — PROPOFOL 10 MG/ML
INJECTION, EMULSION INTRAVENOUS PRN
Status: DISCONTINUED | OUTPATIENT
Start: 2021-11-17 | End: 2021-11-17

## 2021-11-17 RX ORDER — DIMENHYDRINATE 50 MG/ML
25 INJECTION, SOLUTION INTRAMUSCULAR; INTRAVENOUS
Status: COMPLETED | OUTPATIENT
Start: 2021-11-17 | End: 2021-11-17

## 2021-11-17 RX ORDER — MEPERIDINE HYDROCHLORIDE 25 MG/ML
12.5 INJECTION INTRAMUSCULAR; INTRAVENOUS; SUBCUTANEOUS
Status: DISCONTINUED | OUTPATIENT
Start: 2021-11-17 | End: 2021-11-17 | Stop reason: HOSPADM

## 2021-11-17 RX ORDER — OXYCODONE HYDROCHLORIDE 5 MG/1
10 TABLET ORAL EVERY 4 HOURS PRN
Status: DISCONTINUED | OUTPATIENT
Start: 2021-11-17 | End: 2021-11-17 | Stop reason: HOSPADM

## 2021-11-17 RX ORDER — ALBUTEROL SULFATE 0.83 MG/ML
2.5 SOLUTION RESPIRATORY (INHALATION) EVERY 4 HOURS PRN
Status: DISCONTINUED | OUTPATIENT
Start: 2021-11-17 | End: 2021-11-17 | Stop reason: HOSPADM

## 2021-11-17 RX ORDER — LIDOCAINE HYDROCHLORIDE 10 MG/ML
INJECTION, SOLUTION INFILTRATION; PERINEURAL PRN
Status: DISCONTINUED | OUTPATIENT
Start: 2021-11-17 | End: 2021-11-17

## 2021-11-17 RX ORDER — HYDROXYZINE HYDROCHLORIDE 50 MG/1
50 TABLET, FILM COATED ORAL EVERY 6 HOURS PRN
Status: DISCONTINUED | OUTPATIENT
Start: 2021-11-17 | End: 2021-11-17 | Stop reason: HOSPADM

## 2021-11-17 RX ORDER — LEVOFLOXACIN 5 MG/ML
500 INJECTION, SOLUTION INTRAVENOUS ONCE
Status: COMPLETED | OUTPATIENT
Start: 2021-11-17 | End: 2021-11-17

## 2021-11-17 RX ORDER — ONDANSETRON 2 MG/ML
INJECTION INTRAMUSCULAR; INTRAVENOUS PRN
Status: DISCONTINUED | OUTPATIENT
Start: 2021-11-17 | End: 2021-11-17

## 2021-11-17 RX ORDER — FENTANYL CITRATE 50 UG/ML
INJECTION, SOLUTION INTRAMUSCULAR; INTRAVENOUS PRN
Status: DISCONTINUED | OUTPATIENT
Start: 2021-11-17 | End: 2021-11-17

## 2021-11-17 RX ADMIN — SODIUM CHLORIDE, POTASSIUM CHLORIDE, SODIUM LACTATE AND CALCIUM CHLORIDE: 600; 310; 30; 20 INJECTION, SOLUTION INTRAVENOUS at 12:08

## 2021-11-17 RX ADMIN — SUGAMMADEX 100 MG: 100 INJECTION, SOLUTION INTRAVENOUS at 10:22

## 2021-11-17 RX ADMIN — DEXAMETHASONE SODIUM PHOSPHATE 8 MG: 4 INJECTION, SOLUTION INTRA-ARTICULAR; INTRALESIONAL; INTRAMUSCULAR; INTRAVENOUS; SOFT TISSUE at 09:31

## 2021-11-17 RX ADMIN — HYDROXYZINE HYDROCHLORIDE 50 MG: 50 TABLET, FILM COATED ORAL at 11:36

## 2021-11-17 RX ADMIN — DIMENHYDRINATE 25 MG: 50 INJECTION, SOLUTION INTRAMUSCULAR; INTRAVENOUS at 11:07

## 2021-11-17 RX ADMIN — SODIUM CHLORIDE, POTASSIUM CHLORIDE, SODIUM LACTATE AND CALCIUM CHLORIDE: 600; 310; 30; 20 INJECTION, SOLUTION INTRAVENOUS at 08:42

## 2021-11-17 RX ADMIN — MIDAZOLAM 2 MG: 1 INJECTION INTRAMUSCULAR; INTRAVENOUS at 09:25

## 2021-11-17 RX ADMIN — HYDROMORPHONE HYDROCHLORIDE 0.4 MG: 0.2 INJECTION, SOLUTION INTRAMUSCULAR; INTRAVENOUS; SUBCUTANEOUS at 12:07

## 2021-11-17 RX ADMIN — HYDROMORPHONE HYDROCHLORIDE 0.4 MG: 0.2 INJECTION, SOLUTION INTRAMUSCULAR; INTRAVENOUS; SUBCUTANEOUS at 12:12

## 2021-11-17 RX ADMIN — GLYCOPYRROLATE 0.1 MG: 0.2 INJECTION, SOLUTION INTRAMUSCULAR; INTRAVENOUS at 09:31

## 2021-11-17 RX ADMIN — OXYCODONE HYDROCHLORIDE 10 MG: 5 TABLET ORAL at 13:17

## 2021-11-17 RX ADMIN — ONDANSETRON 4 MG: 2 INJECTION INTRAMUSCULAR; INTRAVENOUS at 10:18

## 2021-11-17 RX ADMIN — ONDANSETRON 4 MG: 4 TABLET, ORALLY DISINTEGRATING ORAL at 23:04

## 2021-11-17 RX ADMIN — LIDOCAINE HYDROCHLORIDE 0.1 ML: 10 INJECTION, SOLUTION EPIDURAL; INFILTRATION; INTRACAUDAL; PERINEURAL at 08:42

## 2021-11-17 RX ADMIN — PROPOFOL 100 MCG/KG/MIN: 10 INJECTION, EMULSION INTRAVENOUS at 09:31

## 2021-11-17 RX ADMIN — ACETAMINOPHEN 975 MG: 325 TABLET, FILM COATED ORAL at 08:42

## 2021-11-17 RX ADMIN — PROPOFOL 200 MG: 10 INJECTION, EMULSION INTRAVENOUS at 09:31

## 2021-11-17 RX ADMIN — ROCURONIUM BROMIDE 40 MG: 50 INJECTION, SOLUTION INTRAVENOUS at 09:31

## 2021-11-17 RX ADMIN — ONDANSETRON 4 MG: 2 INJECTION INTRAMUSCULAR; INTRAVENOUS at 09:31

## 2021-11-17 RX ADMIN — PROPOFOL 50 MG: 10 INJECTION, EMULSION INTRAVENOUS at 10:29

## 2021-11-17 RX ADMIN — FENTANYL CITRATE 50 MCG: 50 INJECTION, SOLUTION INTRAMUSCULAR; INTRAVENOUS at 11:21

## 2021-11-17 RX ADMIN — LIDOCAINE HYDROCHLORIDE 50 MG: 10 INJECTION, SOLUTION INFILTRATION; PERINEURAL at 09:31

## 2021-11-17 RX ADMIN — HYDROCODONE BITARTRATE AND ACETAMINOPHEN 2 TABLET: 5; 325 TABLET ORAL at 23:04

## 2021-11-17 RX ADMIN — FENTANYL CITRATE 50 MCG: 50 INJECTION, SOLUTION INTRAMUSCULAR; INTRAVENOUS at 11:12

## 2021-11-17 RX ADMIN — LEVOFLOXACIN 500 MG: 5 INJECTION, SOLUTION INTRAVENOUS at 09:25

## 2021-11-17 RX ADMIN — SCOPALAMINE 1 PATCH: 1 PATCH, EXTENDED RELEASE TRANSDERMAL at 08:52

## 2021-11-17 RX ADMIN — KETOROLAC TROMETHAMINE 30 MG: 30 INJECTION, SOLUTION INTRAMUSCULAR at 10:18

## 2021-11-17 RX ADMIN — GLYCOPYRROLATE 0.1 MG: 0.2 INJECTION, SOLUTION INTRAMUSCULAR; INTRAVENOUS at 09:25

## 2021-11-17 RX ADMIN — GABAPENTIN 300 MG: 300 CAPSULE ORAL at 08:42

## 2021-11-17 RX ADMIN — FENTANYL CITRATE 150 MCG: 50 INJECTION, SOLUTION INTRAMUSCULAR; INTRAVENOUS at 09:31

## 2021-11-17 RX ADMIN — INDOCYANINE GREEN AND WATER 2.5 MG: KIT at 08:47

## 2021-11-17 RX ADMIN — HYDROMORPHONE HYDROCHLORIDE 0.2 MG: 0.2 INJECTION, SOLUTION INTRAMUSCULAR; INTRAVENOUS; SUBCUTANEOUS at 12:17

## 2021-11-17 RX ADMIN — FENTANYL CITRATE 100 MCG: 50 INJECTION, SOLUTION INTRAMUSCULAR; INTRAVENOUS at 09:46

## 2021-11-17 ASSESSMENT — MIFFLIN-ST. JEOR
SCORE: 1360.65
SCORE: 1408.29

## 2021-11-17 NOTE — ANESTHESIA POSTPROCEDURE EVALUATION
Patient: Shae Pierre    Procedure: Procedure(s):  CHOLECYSTECTOMY, LAPAROSCOPIC       Diagnosis:Calculus of gallbladder without cholecystitis without obstruction [K80.20]  Diagnosis Additional Information: No value filed.    Anesthesia Type:  General    Note:  Disposition: Outpatient   Postop Pain Control: Uneventful            Sign Out: Well controlled pain   PONV: No   Neuro/Psych: Uneventful            Sign Out: Acceptable/Baseline neuro status   Airway/Respiratory: Uneventful            Sign Out: Acceptable/Baseline resp. status   CV/Hemodynamics: Uneventful            Sign Out: Acceptable CV status; No obvious hypovolemia; No obvious fluid overload   Other NRE: NONE   DID A NON-ROUTINE EVENT OCCUR? No           Last vitals:  Vitals Value Taken Time   BP     Temp     Pulse     Resp     SpO2         Electronically Signed By: STEFANIE Hubbard CRNA  November 17, 2021  4:01 PM

## 2021-11-17 NOTE — OP NOTE
Procedure Date: 11/17/2021    PREOPERATIVE DIAGNOSES:    1.  Symptomatic cholelithiasis.  2.  Chronic calculous cholecystitis.    POSTOPERATIVE DIAGNOSES:  1.  Symptomatic cholelithiasis.  2.  Chronic calculous cholecystitis.    PROCEDURE PERFORMED:  Laparoscopic cholecystectomy.    SURGEON:  Chip Santana MD    ANESTHESIA:  General.    INDICATIONS FOR PROCEDURE:  This 56-year-old female patient presented to my office with symptomatic gallstones.  She requested that the gallbladder be removed.  Prior to the procedure, she was counseled about the risks, benefits, and alternatives of the procedure and she agreed to proceed.  All of her questions were answered.    Initially, we had consented her to remove a lump on the skin of her right breast.  She had this taken care of by a dermatologist prior to surgery, so this was not included as part of the procedure today.    DESCRIPTION OF PROCEDURE:  After the patient was asleep, a surgical timeout was performed to identify both the patient and the proposed procedure.  All present were in agreement.      A small midline infraumbilical incision was made using cautery and dissection carried down through subcutaneous tissues to the level of the fascia.  The fascia was incised in the midline.  The fascia was elevated and 2 stay sutures of 0 Vicryl were placed.  The peritoneal cavity was bluntly entered and the Eva trocar placed.  The abdomen was insufflated to 15 mmHg pressure with CO2 gas.  Three further 5 mm ports were placed in the epigastrium and right upper quadrant under direct vision.  The patient was then placed in a steep reverse Trendelenburg position with the left side rotated downward.  The gallbladder was relatively normal in appearance, but it was encased in some omentum.  This was easily stripped off.  The fundus of the gallbladder was grasped and then retracted superiorly and anteriorly, exposing the infundibulum.  A second grasper was placed on the infundibulum  and all of the omentum that was stuck to the gallbladder was taken down.  The lymph node of Calot was identified and it was in this area that we started careful dissection of the fat overlying the cystic duct.  Using both indocyanine green and direct visualization, the cystic duct, common duct junction was identified positively and a critical view of safety was obtained.  I had already dissected behind the gallbladder and verified that there was only 1 structure in addition to the artery that was going into the gallbladder.  The cystic duct was clipped twice distally, once proximally, and divided with the scissors.  The cystic artery was dissected free likewise and clipped and divided.  The gallbladder was removed from the gallbladder bed using cautery.  It was then placed into an Endobag and retrieved through the infraumbilical Eva port site.  The Eva was then replaced and the abdomen reinflated.  The right upper quadrant was irrigated and aspirated.  There was no bleeding seen.  The cystic duct and cystic artery clips were intact.  All the ports were then removed under direct vision.  No bleeding was seen.  The infraumbilical fascia was closed with a couple of interrupted figure-of-eight 0 Vicryl sutures.  All the wounds were infiltrated with Marcaine with epinephrine and then closed with subcuticular 4-0 Vicryl suture.      The patient was then awakened from her anesthetic and taken to the recovery room awake and in stable condition, having tolerated the procedure well.  There were no complications.  Needle, sponge, and instrument counts were correct x 2.  Estimated blood loss was 5 mL or less.    Chip Santana MD        D: 2021   T: 2021   MT: [id number]    Name:     MANNY ALEJANDRO  MRN:      [id number]        Account:        [id number]   :      1965           Procedure Date: 2021     Document: [id number]

## 2021-11-17 NOTE — BRIEF OP NOTE
[facility]    Brief Operative Note    Pre-operative diagnosis: Calculus of gallbladder without cholecystitis without obstruction [K80.20]  Post-operative diagnosis calculous cholecystitis    Procedure: Procedure(s):  CHOLECYSTECTOMY, LAPAROSCOPIC  Surgeon: Surgeon(s) and Role:     * Chip Santana MD - Primary  Anesthesia: General   Estimated Blood Loss: 5 mL from 11/17/2021  9:25 AM to 11/17/2021 10:40 AM      Drains: None  Specimens:   ID Type Source Tests Collected by Time Destination   1 :  Tissue Gallbladder SURGICAL PATHOLOGY EXAM Chip Santana MD 11/17/2021 10:19 AM      Findings:   normal anatomy, CBD positively identified and protected using ICG, EBL 5 cc.  Complications: None.  Implants: * No implants in log *

## 2021-11-17 NOTE — DISCHARGE INSTRUCTIONS
Same Day Surgery Discharge Instructions  Special Precautions After Surgery - Adult    1. It is not unusual to feel lightheaded or faint, up to 24 hours after surgery or while taking pain medication.  If you have these symptoms; sit for a few minutes before standing and have someone assist you when getting up.  2. You should rest and relax for the next 24 hours and must have someone stay with you for at least 24 hours after your discharge.  3. DO NOT DRIVE any vehicle or operate mechanical equipment for 24 hours following the end of your surgery.  DO NOT DRIVE while taking narcotic pain medications that have been prescribed by your physician.  If you had a limb operated on, you must be able to use it fully to drive.  4. DO NOT drink alcoholic beverages for 24 hours following surgery or while taking prescription pain medication.  5. Drink clear liquids (apple juice, ginger ale, broth, 7-Up, etc.).  Progress to your regular diet as you feel able.  6. Any questions call your physician and do not make important decisions for 24 hours.    ACTIVITY  ? As directed by Dr Santana     INCISIONAL CARE  ? As directed by Dr Santana     Call for an appointment to return to the clinic { :[id number]}    Medications:  ? Acetaminophen (Tylenol):  Next dose: when you get home.  ? Hydrocodone (Norco, Vicodin):  Next dose: 5:30.  ? Ibuprofen (Motrin, Advil):  Next dose: when you get home  ? Follow the instructions on the bottle.       __________________________________________________________________________________________________________________________________  IMPORTANT NUMBERS:    [facility] Main Number:  [phone], [phone]  Pharmacy:  [phone]  Same Day Surgery:  [phone], Monday - Friday until 8:30 p.m.  Emergency Room:  [phone]         [facility]:  [phone]       Wash incisions daily with soap and water. Some mild redness or swelling is expected. If draining, cover with dry  gauze.    No heavy lifting restrictions.  You may lift as comfort permits.    Okay to use ice pack over wounds as necessary for comfort.    Use pain medication as necessary but avoid constipating side effects. Ibuprofen okay but avoid Tylenol as your pain medication already contains this.    Diet as tolerated. No restrictions.    Follow up with Dr Santana in 1-2 weeks.    You may return to work when you feel up to it.  Do not drive while taking narcotic-containing pain medications.

## 2021-11-17 NOTE — ANESTHESIA CARE TRANSFER NOTE
Patient: Shae Pierre    Procedure: Procedure(s):  CHOLECYSTECTOMY, LAPAROSCOPIC       Diagnosis: Calculus of gallbladder without cholecystitis without obstruction [K80.20]  Diagnosis Additional Information: No value filed.    Anesthesia Type:   General     Note:    Oropharynx: oropharynx clear of all foreign objects and spontaneously breathing  Level of Consciousness: drowsy  Oxygen Supplementation: nasal cannula    Independent Airway: airway patency satisfactory and stable  Dentition: dentition unchanged  Vital Signs Stable: post-procedure vital signs reviewed and stable  Report to RN Given: handoff report given  Patient transferred to: PACU    Handoff Report: Identifed the Patient, Identified the Reponsible Provider, Reviewed the pertinent medical history, Discussed the surgical course, Reviewed Intra-OP anesthesia mangement and issues during anesthesia, Set expectations for post-procedure period and Allowed opportunity for questions and acknowledgement of understanding      Vitals:  Vitals Value Taken Time   /72 11/17/21 1041   Temp     Pulse 93 11/17/21 1041   Resp     SpO2 90 % 11/17/21 1042   Vitals shown include unvalidated device data.    Electronically Signed By: STEFANIE Santillan CRNA  November 17, 2021  10:44 AM

## 2021-11-18 VITALS
TEMPERATURE: 97.4 F | SYSTOLIC BLOOD PRESSURE: 120 MMHG | HEART RATE: 58 BPM | DIASTOLIC BLOOD PRESSURE: 70 MMHG | HEIGHT: 60 IN | WEIGHT: 185 LBS | OXYGEN SATURATION: 97 % | BODY MASS INDEX: 36.32 KG/M2 | RESPIRATION RATE: 16 BRPM

## 2021-11-18 LAB
PATH REPORT.COMMENTS IMP SPEC: NORMAL
PATH REPORT.COMMENTS IMP SPEC: NORMAL
PATH REPORT.FINAL DX SPEC: NORMAL
PATH REPORT.GROSS SPEC: NORMAL
PATH REPORT.MICROSCOPIC SPEC OTHER STN: NORMAL
PHOTO IMAGE: NORMAL

## 2021-11-18 NOTE — TELEPHONE ENCOUNTER
"Snow, daughter, calling with concerns about patient having \"labored breathing.\" patient is with daughter. Snow says patient's 02 level is 91-92 % but has to take deliberate breaths. Patient is not drowsy; speech is a bit slurred but due to taking pain medication. Advised will page on-call provider to call her. Advised writer will check in with her in 30 min to make sure on-call called.    Paged Dr. Hawkins at 7:24 pm to call Snow, daughter, back.         Reason for Disposition    [1] Caller has URGENT question AND [2] triager unable to answer question    Additional Information    Negative: [1] Choked on something AND [2] difficulty breathing now    Negative: [1] Breathing stopped AND [2] hasn't returned    Negative: Wheezing or stridor starts suddenly after allergic food, new medicine or bee sting    Negative: Slow, shallow, weak breathing    Negative: Struggling (gasping) for each breath (severe respiratory distress) (Triage tip: Listen to the child's breathing.)    Negative: Unable to speak, cry or suck because of difficulty breathing (Triage tip: Listen to the child's breathing.)    Negative: Making grunting or moaning noises with each breath (Triage tip: Listen to the child's breathing.)    Negative: Bluish (or gray) color of lips or face now    Negative: Can't think clearly or not alert    Negative: Sounds like a life-threatening emergency to the triager    Negative: Sounds like a life-threatening emergency to the triager    Negative: [1] Widespread rash AND [2] bright red, sunburn-like    Negative: [1] SEVERE headache AND [2] after spinal (epidural) anesthesia    Negative: [1] Vomiting AND [2] persists > 4 hours    Negative: [1] Vomiting AND [2] abdomen looks much more swollen than usual    Negative: [1] Drinking very little AND [2] dehydration suspected (e.g., no urine > 12 hours, very dry mouth, very lightheaded)    Negative: Patient sounds very sick or weak to the triager    Negative: [1] SEVERE post-op " pain (e.g., excruciating, pain scale 8-10) AND [2] not controlled with pain medications    Negative: Fever > 100.4 F (38.0 C)    Negative: Sounds like a serious complication to the triager    Protocols used: POST-OP SYMPTOMS AND [id number], BREATHING DIFFICULTY (RESPIRATORY DISTRESS)-P-AH

## 2021-11-18 NOTE — ED TRIAGE NOTES
concerned about shallow respiration and low SpO2 at home (88% at home) after having gallbladder removed this am. Has had reactions to anesthesia in the past

## 2021-11-18 NOTE — ED PROVIDER NOTES
History     Chief Complaint   Patient presents with     Post-op Problem     concerned about shallow respiration and low SpO2 at home (88% at home) after having gallbladder removed this am. Has had reactions to anesthesia in the past     HPI  Shae Pierre is a 56 year old female who is postoperative day zero from a cholecystectomy and presents now for concerns over shallow respirations and low oxygen saturations at home.  She reports heaviness on the right side of her chest but says this has been ongoing for quite some time and is unchanged today.  No pain with inspiration.  She is having some moderate abdominal pain that is helped by the medications she has been taking at home.  Nausea but no vomiting.  No fevers, cough, or taste of blood in the mouth.  No lower extremity swelling or pain.  No prior history of pulmonary embolism or DVT.  She has a pulse oximeter at home and this did show oxygen saturations of 88% at times.    Allergies:  Allergies   Allergen Reactions     Codeine Other (See Comments) and Difficulty breathing     Throat closes     Morphine Other (See Comments) and Difficulty breathing     Throat closes     Sulfa Drugs Hives     Staples Other (See Comments) and Rash     Rash and infection after knee surgery       Problem List:    Patient Active Problem List    Diagnosis Date Noted     H/O total hysterectomy 06/30/2021     Priority: Medium     Benign pathology       Chronic pain of left knee 04/09/2021     Priority: Medium     Urge incontinence of urine 12/03/2015     Priority: Medium     Breast cyst 06/15/2012     Priority: Medium     Migraine headache 01/23/2012     Priority: Medium     CARDIOVASCULAR SCREENING; LDL GOAL LESS THAN 160 10/31/2010     Priority: Medium     BMI 34.0-34.9,adult 03/27/2006     Priority: Medium     Elevated blood pressure reading without diagnosis of hypertension 11/03/2005     Priority: Medium     Dermatophytosis of nail 11/03/2005     Priority: Medium        Past  Medical History:    Past Medical History:   Diagnosis Date     Dysplasia of cervix (uteri) 1998     Fibroids 2012     Other abnormal Pap smear and cervical HPV (human papapillomavirus) 2008     Other acne      PONV (postoperative nausea and vomiting)        Past Surgical History:    Past Surgical History:   Procedure Laterality Date     COLONOSCOPY N/A 2018    Procedure: COLONOSCOPY;  Colonoscopy;  Surgeon: Chuck Donovan MD;  Location: [facility]     EXCISE MASS TRUNK  2012    Procedure: EXCISE MASS TRUNK;  Excisional Removal Chest Wall Cysts X2;  Surgeon: Dennys Henriquez MD;  Location: [facility]     HYSTERECTOMY, PAP NO LONGER INDICATED       LAPAROSCOPIC ASSISTED HYSTERECTOMY VAGINAL, BILATERAL SALPINGO-OOPHORECTOMY, COMBINED  2012    LAVH-BSO     SURGICAL HISTORY OF -       LEEP     SURGICAL HISTORY OF -       Cryotherapy      SURGICAL HISTORY OF -       Colpo     SURGICAL HISTORY OF -  Left     meniscal injury       Family History:    Family History   Problem Relation Age of Onset     Breast Cancer Maternal Grandmother      Hypertension Father      C.A.D. Father      Alzheimer Disease Father      Lipids Brother      Heart Disease Brother 58        MI     Lipids Brother      Heart Disease Brother 60        MI     Lipids Brother      Lipids Brother      Lipids Sister      Lipids Sister      Circulatory Mother         AAA     Chronic Obstructive Pulmonary Disease Mother      No Known Problems Daughter      Breast Cancer Maternal Aunt      No Known Problems Daughter        Social History:  Marital Status:   [4]  Social History     Tobacco Use     Smoking status: Former Smoker     Types: Cigarettes     Quit date: 2010     Years since quittin.6     Smokeless tobacco: Never Used   Substance Use Topics     Alcohol use: Yes     Comment: occ     Drug use: No        Medications:    ADVIL 200 MG OR TABS  docusate sodium (COLACE) 100 MG capsule  GLUCOSAMINE SULFATE  PO  HYDROcodone-acetaminophen (NORCO) 5-325 MG tablet  latanoprost (XALATAN) 0.005 % ophthalmic solution  MULTI-VITAMIN OR TABS  Omega-3 Fatty Acids (FISH OIL) 435 MG CAPS  ondansetron (ZOFRAN-ODT) 4 MG ODT tab  SUMAtriptan (IMITREX) 100 MG tablet          Review of Systems  Pertinent positives and negatives listed in the HPI, all other systems reviewed and are negative.    Physical Exam   BP: 122/79  Pulse: 82  Temp: 97.4  F (36.3  C)  Resp: 16  Height: 152.4 cm (5')  Weight: 83.9 kg (185 lb)  SpO2: 95 %      Physical Exam  Vitals and nursing note reviewed.   Constitutional:       General: She is in acute distress.      Appearance: She is well-developed. She is not diaphoretic.   HENT:      Head: Normocephalic and atraumatic.      Right Ear: External ear normal.      Left Ear: External ear normal.      Nose: Nose normal.   Eyes:      General: No scleral icterus.     Conjunctiva/sclera: Conjunctivae normal.   Cardiovascular:      Rate and Rhythm: Normal rate and regular rhythm.   Pulmonary:      Effort: Pulmonary effort is normal. No respiratory distress.      Breath sounds: No stridor. No wheezing or rales.   Abdominal:      General: There is no distension.      Palpations: Abdomen is soft.      Comments: Well-appearing surgical scars with intact skin adhesive and no discharge, mild surrounding ecchymosis   Musculoskeletal:      Cervical back: Normal range of motion.      Right lower leg: No edema.      Left lower leg: No edema.   Skin:     General: Skin is warm and dry.   Neurological:      Mental Status: She is alert and oriented to person, place, and time.   Psychiatric:         Behavior: Behavior normal.         ED Course        Procedures              EKG Interpretation:      Interpreted by North Azul MD  Time reviewed: 2325  Symptoms at time of EKG: Shortness of breath   Rhythm: normal sinus   Rate: normal  Axis: normal  Ectopy: none  Conduction: normal  ST Segments/ T Waves: No ST-T wave  changes  Q Waves: none  Comparison to prior: Unchanged    Clinical Impression: normal EKG    Critical Care time:  none               Results for orders placed or performed during the hospital encounter of 11/17/21 (from the past 24 hour(s))   XR Chest 2 Views    Narrative    EXAM: CHEST 2 VIEWS  LOCATION: [facility]  DATE/TIME: 11/17/2021 11:55 PM    INDICATION: Shortness of breath.  COMPARISON: 04/16/2018.    FINDINGS: The lungs are clear. Normal size cardiac silhouette. A thin linear radiodensity projecting over the upper left chest is reportedly a wire within a hanging mask that the patient is wearing.      Impression    IMPRESSION: No evidence of active cardiopulmonary disease.        Medications   HYDROcodone-acetaminophen (NORCO) 5-325 MG per tablet 2 tablet (2 tablets Oral Given 11/17/21 2304)   ondansetron (ZOFRAN-ODT) ODT tab 4 mg (4 mg Oral Given 11/17/21 2304)       Assessments & Plan (with Medical Decision Making)   56-year-old female who is postoperative day zero from a cholecystectomy and presents for shortness of breath.  She is well-appearing here, breathing comfortably on room air, speaking in full sentences.  Blood pressure is 120/70, temperature is 36.3  C, heart rate fifty-eight, SPO2 is 97% on room air.  EKG is sinus rhythm without signs of ischemia.  Chest x-ray obtained, images reviewed independently as well as radiology read reviewed, no signs of infiltrate to suggest pneumonia she is maintained normal oxygen saturations here in the emergency department.  Pulmonary embolism is considered, however with a normal oxygen saturations, normal heart rate, and with her surgery just earlier today I think the risk for pulmonary embolism is very low.  Therefore no further work-up is pursued for this at this time.  At this time she is safe to discharge home with reassurance.  I think it is likely she had some atelectasis related to the recent surgery.  She is discharged with  an incentive spirometer and instructions to continue her home medications, use incentive spirometer aggressively to help prevent atelectasis or pneumonia, return if worse, those follow-up in clinic.  The patient is in agreement to this plan.    I have reviewed the nursing notes.    I have reviewed the findings, diagnosis, plan and need for follow up with the patient.       Discharge Medication List as of 11/18/2021 12:25 AM          Final diagnoses:   Shortness of breath       11/17/2021   [facility] EMERGENCY DEPT     North Azul MD  11/18/21 0659

## 2021-11-18 NOTE — DISCHARGE INSTRUCTIONS
Continue taking your home medications.  Return to the emergency department for fevers, difficulty breathing, worsening pain, coughing up blood, or other concerns.  Otherwise follow-up in clinic if not feeling better over the next 2 to 3 days.

## 2021-11-18 NOTE — TELEPHONE ENCOUNTER
Called to see if she received call from on-call provider. Unable to leave VM since mail box was full.    Will try again later.      Jose Melo RN/[facility]

## 2021-11-18 NOTE — ED TRIAGE NOTES
Pt and daughter states that pt is overdue for her pain medication. Pt slurrs when talking and states that she has had reactions in the past from anesthesia. Pt's sats are 93% on room air and bp is 113/72.

## 2021-11-18 NOTE — TELEPHONE ENCOUNTER
"Patient's daughter called regarding hypoxia.  Patient felt some difficulty in catching breath or \"it's just different.\"  Initially was fine, doing deep breathing exercises.  Patient took pain medications and had worsening hypoxia from low 90's to high 80's.  Breathing exercises not improving.  I advised given her difficulty breathing and hypoxia would recommend proceeding to ER for evaluation.  Daughter agrees to bring patient in.    Gama Hawkins, DO on 11/17/2021 at 8:19 PM    "

## 2021-11-22 ENCOUNTER — DOCUMENTATION ONLY (OUTPATIENT)
Dept: OTHER | Facility: CLINIC | Age: 56
End: 2021-11-22
Payer: COMMERCIAL

## 2021-11-29 ENCOUNTER — OFFICE VISIT (OUTPATIENT)
Dept: SURGERY | Facility: CLINIC | Age: 56
End: 2021-11-29
Payer: COMMERCIAL

## 2021-11-29 VITALS
SYSTOLIC BLOOD PRESSURE: 135 MMHG | HEART RATE: 64 BPM | DIASTOLIC BLOOD PRESSURE: 90 MMHG | BODY MASS INDEX: 36.13 KG/M2 | TEMPERATURE: 97.9 F | HEIGHT: 60 IN

## 2021-11-29 DIAGNOSIS — Z98.890 POSTOPERATIVE STATE: Primary | ICD-10-CM

## 2021-11-29 PROCEDURE — 99024 POSTOP FOLLOW-UP VISIT: CPT | Performed by: SURGERY

## 2021-11-29 NOTE — PATIENT INSTRUCTIONS
Per physician instructions      If you have questions or concerns on any instructions given to you by your provider today or if you need to schedule an appointment, you can reach us at [phone].

## 2021-11-29 NOTE — PROGRESS NOTES
Shae is in for a postop check.  She is now 12 days post laparoscopic cholecystectomy.  She is doing well at this point and has no complications.  She reports that the pain she was having prior to surgery is gone.  She has not had any diarrhea or any other digestive issues.    I reviewed the her pathology with her which was benign.  She had one large stone in the gallbladder.    I reviewed her operative images with her.    On exam: Her wounds are healing nicely without signs of infection or hernia formation.    Impression: Satisfactory postoperative course  Recommendation: Return to usual activities as tolerated.  I will see her back as needed.  She was given a letter to allow her to return to work on Wednesday without restrictions.    Chip Santana MD FACS

## 2021-11-29 NOTE — LETTER
11/29/2021         RE: Shae Pierre  [address] [address] [place] [id number]        Dear Colleague,    Thank you for referring your patient, Shae Pierre, to the [facility]. Please see a copy of my visit note below.    Shae is in for a postop check.  She is now 12 days post laparoscopic cholecystectomy.  She is doing well at this point and has no complications.  She reports that the pain she was having prior to surgery is gone.  She has not had any diarrhea or any other digestive issues.    I reviewed the her pathology with her which was benign.  She had one large stone in the gallbladder.    I reviewed her operative images with her.    On exam: Her wounds are healing nicely without signs of infection or hernia formation.    Impression: Satisfactory postoperative course  Recommendation: Return to usual activities as tolerated.  I will see her back as needed.  She was given a letter to allow her to return to work on Wednesday without restrictions.    Chip Santana MD FACS      Again, thank you for allowing me to participate in the care of your patient.        Sincerely,        Chip Santana MD

## 2021-11-29 NOTE — NURSING NOTE
Initial BP (!) 135/90 (BP Location: Right arm, Patient Position: Sitting, Cuff Size: Adult Regular)   Pulse 64   Temp 97.9  F (36.6  C) (Tympanic)   Ht 1.524 m (5')   LMP 04/05/2012   BMI 36.13 kg/m   Estimated body mass index is 36.13 kg/m  as calculated from the following:    Height as of this encounter: 1.524 m (5').    Weight as of 11/17/21: 83.9 kg (185 lb). .    Gunjan Obrien CMA

## 2021-11-29 NOTE — LETTER
[facility]  [address] [facility] [address]  [phone]          November 29, 2021    RE:  Shae Pierre                                                                                                                                                       [id number] [address] [place] [id number]            To whom it may concern:    Shae Pierre is under my professional care for surgical care.  She  may return to work without restrictions starting December 1st.      Sincerely,        Chip Santana MD

## 2021-12-03 ENCOUNTER — OFFICE VISIT (OUTPATIENT)
Dept: FAMILY MEDICINE | Facility: CLINIC | Age: 56
End: 2021-12-03
Payer: COMMERCIAL

## 2021-12-03 VITALS
OXYGEN SATURATION: 98 % | BODY MASS INDEX: 37.62 KG/M2 | TEMPERATURE: 97.6 F | HEART RATE: 62 BPM | HEIGHT: 60 IN | RESPIRATION RATE: 16 BRPM | WEIGHT: 191.6 LBS | SYSTOLIC BLOOD PRESSURE: 122 MMHG | DIASTOLIC BLOOD PRESSURE: 86 MMHG

## 2021-12-03 DIAGNOSIS — Z01.818 PREOP GENERAL PHYSICAL EXAM: Primary | ICD-10-CM

## 2021-12-03 DIAGNOSIS — S83.242S OTHER TEAR OF MEDIAL MENISCUS OF LEFT KNEE AS CURRENT INJURY, SEQUELA: ICD-10-CM

## 2021-12-03 PROCEDURE — 99214 OFFICE O/P EST MOD 30 MIN: CPT | Performed by: FAMILY MEDICINE

## 2021-12-03 ASSESSMENT — MIFFLIN-ST. JEOR: SCORE: 1390.59

## 2021-12-03 NOTE — PROGRESS NOTES
[facility]  [address]  [facility] [address]  Phone: [phone]  Primary Provider: No Ref-Primary, Physician  Pre-op Performing Provider: DEYA PALMER    PREOPERATIVE EVALUATION:  Today's date: 12/3/2021    Shae Pierre is a 56 year old female who presents for a preoperative evaluation.    Surgical Information:  Surgery/Procedure: left knee arthroscopy  Surgery Location: [facility]   Surgeon: Dr. Bowser   Surgery Date: 12/7/21  Time of Surgery: 0700  Where patient plans to recover: At home with family  Fax number for surgical facility: [phone]    Type of Anesthesia Anticipated: General    Assessment & Plan     The proposed surgical procedure is considered LOW risk.    Preop general physical exam    Other tear of medial meniscus of left knee as current injury, sequela      Risks and Recommendations:  The patient has the following additional risks and recommendations for perioperative complications:   - No identified additional risk factors other than previously addressed    Medication Instructions:  Patient has already been holding all NSAIDs and will not take any medications on the day of surgery    RECOMMENDATION:  APPROVAL GIVEN to proceed with proposed procedure pending review of diagnostic evaluation.    Deya Palmer MD  Family Medicine  [facility]              Subjective     HPI related to upcoming procedure:     Preop Questions 12/3/2021   1. Have you ever had a heart attack or stroke? No   2. Have you ever had surgery on your heart or blood vessels, such as a stent placement, a coronary artery bypass, or surgery on an artery in your head, neck, heart, or legs? No   3. Do you have chest pain with activity? No   4. Do you have a history of  heart failure? No   5. Do you currently have a cold, bronchitis or symptoms of other infection? No   6. Do you have a cough, shortness of breath, or wheezing? No   7. Do you or  anyone in your family have previous history of blood clots? No   8. Do you or does anyone in your family have a serious bleeding problem such as prolonged bleeding following surgeries or cuts? No   9. Have you ever had problems with anemia or been told to take iron pills? No   10. Have you had any abnormal blood loss such as black, tarry or bloody stools, or abnormal vaginal bleeding? No   11. Have you ever had a blood transfusion? No   12. Are you willing to have a blood transfusion if it is medically needed before, during, or after your surgery? Yes   13. Have you or any of your relatives ever had problems with anesthesia? YES - personal hx of nausea    14. Do you have sleep apnea, excessive snoring or daytime drowsiness? No   15. Do you have any artifical heart valves or other implanted medical devices like a pacemaker, defibrillator, or continuous glucose monitor? No   16. Do you have artificial joints? No   17. Are you allergic to latex? No   18. Is there any chance that you may be pregnant? No     Health Care Directive:  Patient has a Health Care Directive on file    Preoperative Review of :   reviewed - few prescriptions that were appropriate for surgical procedures    Review of Systems  CONSTITUTIONAL: NEGATIVE for fever, chills, change in weight  INTEGUMENTARY/SKIN: NEGATIVE for worrisome rashes, moles or lesions  EYES: NEGATIVE for vision changes or irritation  ENT/MOUTH: NEGATIVE for ear, mouth and throat problems  RESP: NEGATIVE for significant cough or SOB  CV: NEGATIVE for chest pain, palpitations or peripheral edema  GI: NEGATIVE for nausea, abdominal pain, heartburn, or change in bowel habits  : NEGATIVE for frequency, dysuria, or hematuria  MUSCULOSKELETAL: chronic knee pain  NEURO: NEGATIVE for weakness, dizziness or paresthesias  ENDOCRINE: NEGATIVE for temperature intolerance, skin/hair changes  HEME: NEGATIVE for bleeding problems  PSYCHIATRIC: NEGATIVE for changes in mood or  affect    Patient Active Problem List    Diagnosis Date Noted     H/O total hysterectomy 06/30/2021     Priority: Medium     Benign pathology       Chronic pain of left knee 04/09/2021     Priority: Medium     Urge incontinence of urine 12/03/2015     Priority: Medium     Breast cyst 06/15/2012     Priority: Medium     Migraine headache 01/23/2012     Priority: Medium     CARDIOVASCULAR SCREENING; LDL GOAL LESS THAN 160 10/31/2010     Priority: Medium     BMI 34.0-34.9,adult 03/27/2006     Priority: Medium     Elevated blood pressure reading without diagnosis of hypertension 11/03/2005     Priority: Medium     Dermatophytosis of nail 11/03/2005     Priority: Medium      Past Medical History:   Diagnosis Date     Dysplasia of cervix (uteri) 1998     Fibroids 2/22/2012     Other abnormal Pap smear and cervical HPV (human papapillomavirus) 9/29/2008    EM>40 and pos HPV unknown type rec rpt pap 6 mos/11/08  1/26/2009 F/u pap was normal  Will have next pap in 1 year.      Other acne      PONV (postoperative nausea and vomiting)      Past Surgical History:   Procedure Laterality Date     COLONOSCOPY N/A 04/16/2018    Procedure: COLONOSCOPY;  Colonoscopy;  Surgeon: Chuck Donovan MD;  Location: [facility]     EXCISE MASS TRUNK  07/13/2012    Procedure: EXCISE MASS TRUNK;  Excisional Removal Chest Wall Cysts X2;  Surgeon: Dennys Henriquez MD;  Location: [facility]     HYSTERECTOMY, PAP NO LONGER INDICATED       LAPAROSCOPIC ASSISTED HYSTERECTOMY VAGINAL, BILATERAL SALPINGO-OOPHORECTOMY, COMBINED  04/05/2012    LAVH-BSO     LAPAROSCOPIC CHOLECYSTECTOMY N/A 11/17/2021    Procedure: CHOLECYSTECTOMY, LAPAROSCOPIC;  Surgeon: Chip Santana MD;  Location: [facility]     SURGICAL HISTORY OF -       LEEP     SURGICAL HISTORY OF -       Cryotherapy      SURGICAL HISTORY OF -       Colpo     SURGICAL HISTORY OF -  Left     meniscal injury     Current Outpatient Medications   Medication Sig Dispense Refill     ADVIL 200 MG OR TABS TAKE 1 TO  2 TABLETS EVERY 4 TO 6 HOURS AS NEEDED WITH FOOD 120 0     GLUCOSAMINE SULFATE PO Take 500 mg by mouth daily       latanoprost (XALATAN) 0.005 % ophthalmic solution Place 1 drop into each eye EVERY EVENING       MULTI-VITAMIN OR TABS 1 TABLET DAILY 30 0     Omega-3 Fatty Acids (FISH OIL) 435 MG CAPS        SUMAtriptan (IMITREX) 100 MG tablet Take 1 tablet (100 mg) by mouth at onset of headache for migraine May repeat in 2 hours prn: max 2/day 12 tablet 4     docusate sodium (COLACE) 100 MG capsule Take 1 capsule (100 mg) by mouth 2 times daily (Patient not taking: Reported on 2021) 60 capsule 1     Allergies   Allergen Reactions     Codeine Other (See Comments) and Difficulty breathing     Throat closes     Morphine Other (See Comments) and Difficulty breathing     Throat closes     Sulfa Drugs Hives     Staples Other (See Comments) and Rash     Rash and infection after knee surgery      Social History     Tobacco Use     Smoking status: Former Smoker     Types: Cigarettes     Quit date: 2010     Years since quittin.6     Smokeless tobacco: Never Used   Substance Use Topics     Alcohol use: Yes     Comment: occ     History   Drug Use No         Objective     /86   Pulse 62   Temp 97.6  F (36.4  C) (Tympanic)   Resp 16   Ht 1.524 m (5')   Wt 86.9 kg (191 lb 9.6 oz)   LMP 2012   SpO2 98%   BMI 37.42 kg/m      Physical Exam    GENERAL APPEARANCE: healthy, alert and no distress     EYES: EOMI, PERRL     HENT: ear canals and TM's normal and nose and mouth without ulcers or lesions     NECK: no adenopathy, no asymmetry, masses, or scars and thyroid normal to palpation     RESP: lungs clear to auscultation - no rales, rhonchi or wheezes     CV: regular rates and rhythm, normal S1 S2, no S3 or S4 and no murmur, click or rub     ABDOMEN:  soft, nontender, no HSM or masses and bowel sounds normal     MS: extremities normal- no gross deformities noted, no evidence of inflammation in joints,  FROM in all extremities.     SKIN: no suspicious lesions or rashes     NEURO: Normal strength and tone, sensory exam grossly normal, mentation intact and speech normal     PSYCH: mentation appears normal. and affect normal/bright     LYMPHATICS: No cervical adenopathy    Recent Labs   Lab Test 09/27/21  1253 04/19/21  1531   HGB 15.1 14.1    357    136   POTASSIUM 3.8 3.6   CR 0.64 0.66      Diagnostics:  No labs or EKG required for this surgery    Revised Cardiac Risk Index (RCRI):  The patient has the following serious cardiovascular risks for perioperative complications:   - No serious cardiac risks = 0 points     RCRI Interpretation: 0 points: Class I (very low risk - 0.4% complication rate)         Signed Electronically by: Deya Rivera MD  Copy of this evaluation report is provided to requesting physician.

## 2021-12-21 ENCOUNTER — IMMUNIZATION (OUTPATIENT)
Dept: FAMILY MEDICINE | Facility: CLINIC | Age: 56
End: 2021-12-21
Payer: COMMERCIAL

## 2021-12-21 DIAGNOSIS — Z23 HIGH PRIORITY FOR 2019-NCOV VACCINE: ICD-10-CM

## 2021-12-21 DIAGNOSIS — Z23 NEED FOR VACCINATION: ICD-10-CM

## 2021-12-21 PROCEDURE — 90471 IMMUNIZATION ADMIN: CPT

## 2021-12-21 PROCEDURE — 91306 COVID-19,PF,MODERNA (18+ YRS BOOSTER .25ML): CPT

## 2021-12-21 PROCEDURE — 0064A COVID-19,PF,MODERNA (18+ YRS BOOSTER .25ML): CPT

## 2021-12-21 PROCEDURE — 90715 TDAP VACCINE 7 YRS/> IM: CPT

## 2021-12-21 PROCEDURE — 99207 PR NO CHARGE LOS: CPT

## 2021-12-21 NOTE — PROGRESS NOTES
Prior to immunization administration, verified patients identity using patient s name and date of birth. Please see Immunization Activity for additional information.     Screening Questionnaire for Adult Immunization    Are you sick today?   No   Do you have allergies to medications, food, a vaccine component or latex?   Yes   Have you ever had a serious reaction after receiving a vaccination?   No   Do you have a long-term health problem with heart, lung, kidney, or metabolic disease (e.g., diabetes), asthma, a blood disorder, no spleen, complement component deficiency, a cochlear implant, or a spinal fluid leak?  Are you on long-term aspirin therapy?   No   Do you have cancer, leukemia, HIV/AIDS, or any other immune system problem?   No   Do you have a parent, brother, or sister with an immune system problem?   No   In the past 3 months, have you taken medications that affect  your immune system, such as prednisone, other steroids, or anticancer drugs; drugs for the treatment of rheumatoid arthritis, Crohn s disease, or psoriasis; or have you had radiation treatments?   No   Have you had a seizure, or a brain or other nervous system problem?   No   During the past year, have you received a transfusion of blood or blood    products, or been given immune (gamma) globulin or antiviral drug?   No   For women: Are you pregnant or is there a chance you could become       pregnant during the next month?   No   Have you received any vaccinations in the past 4 weeks?   No     Immunization questionnaire was positive for at least one answer.      injection of Tdap and Moderna Covid-19 booster 0.25mL given by Sushma Alva CMA. Patient instructed to remain in clinic for 15 minutes afterwards, and to report any adverse reaction to me immediately.       Screening performed by Sushma Alva CMA on 12/21/2021 at 9:15 AM.

## 2021-12-29 ENCOUNTER — TRANSCRIBE ORDERS (OUTPATIENT)
Dept: OTHER | Age: 56
End: 2021-12-29
Payer: COMMERCIAL

## 2021-12-29 DIAGNOSIS — Z98.890 S/P LEFT KNEE ARTHROSCOPY: Primary | ICD-10-CM

## 2022-07-18 ENCOUNTER — ANCILLARY PROCEDURE (OUTPATIENT)
Dept: MAMMOGRAPHY | Facility: CLINIC | Age: 57
End: 2022-07-18
Attending: OBSTETRICS & GYNECOLOGY
Payer: COMMERCIAL

## 2022-07-18 DIAGNOSIS — Z12.31 VISIT FOR SCREENING MAMMOGRAM: ICD-10-CM

## 2022-07-18 PROCEDURE — 77067 SCR MAMMO BI INCL CAD: CPT | Mod: TC | Performed by: RADIOLOGY

## 2022-07-18 PROCEDURE — 77063 BREAST TOMOSYNTHESIS BI: CPT | Mod: TC | Performed by: RADIOLOGY

## 2022-08-20 ENCOUNTER — HEALTH MAINTENANCE LETTER (OUTPATIENT)
Age: 57
End: 2022-08-20

## 2022-09-08 ENCOUNTER — OFFICE VISIT (OUTPATIENT)
Dept: FAMILY MEDICINE | Facility: CLINIC | Age: 57
End: 2022-09-08
Payer: COMMERCIAL

## 2022-09-08 VITALS
WEIGHT: 199 LBS | TEMPERATURE: 98.2 F | DIASTOLIC BLOOD PRESSURE: 80 MMHG | RESPIRATION RATE: 14 BRPM | HEIGHT: 62 IN | HEART RATE: 68 BPM | OXYGEN SATURATION: 95 % | SYSTOLIC BLOOD PRESSURE: 116 MMHG | BODY MASS INDEX: 36.62 KG/M2

## 2022-09-08 DIAGNOSIS — Z13.1 SCREENING FOR DIABETES MELLITUS: ICD-10-CM

## 2022-09-08 DIAGNOSIS — Z00.00 ROUTINE GENERAL MEDICAL EXAMINATION AT A HEALTH CARE FACILITY: Primary | ICD-10-CM

## 2022-09-08 DIAGNOSIS — Z13.220 SCREENING FOR HYPERLIPIDEMIA: ICD-10-CM

## 2022-09-08 PROCEDURE — 99396 PREV VISIT EST AGE 40-64: CPT | Performed by: NURSE PRACTITIONER

## 2022-09-08 ASSESSMENT — ENCOUNTER SYMPTOMS
DIARRHEA: 0
CHILLS: 0
HEMATOCHEZIA: 0
NAUSEA: 0
ARTHRALGIAS: 0
NERVOUS/ANXIOUS: 0
MYALGIAS: 0
ABDOMINAL PAIN: 0
JOINT SWELLING: 0
EYE PAIN: 0
PARESTHESIAS: 0
HEARTBURN: 0
SORE THROAT: 0
HEADACHES: 0
COUGH: 0
SHORTNESS OF BREATH: 0
DIZZINESS: 0
DYSURIA: 0
WEAKNESS: 0
CONSTIPATION: 0
PALPITATIONS: 0
HEMATURIA: 0
BREAST MASS: 0
FREQUENCY: 0
FEVER: 0

## 2022-09-08 ASSESSMENT — PAIN SCALES - GENERAL: PAINLEVEL: NO PAIN (0)

## 2022-09-08 NOTE — PROGRESS NOTES
SUBJECTIVE:   CC: Shae Pierre is an 57 year old woman who presents for preventive health visit.   Patient is overall doing well, is exercising about 5 days a week by walking. No health concerns today.    Patient has been advised of split billing requirements and indicates understanding: Yes  Healthy Habits:     Getting at least 3 servings of Calcium per day:  NO    Bi-annual eye exam:  Yes    Dental care twice a year:  Yes    Sleep apnea or symptoms of sleep apnea:  None    Diet:  Regular (no restrictions)    Frequency of exercise:  4-5 days/week    Duration of exercise:  30-45 minutes    Taking medications regularly:  Yes    Barriers to taking medications:  None    Medication side effects:  None    PHQ-2 Total Score: 0    Additional concerns today:  No        Today's PHQ-2 Score:   PHQ-2 (  [employer]) 2022   Q1: Little interest or pleasure in doing things 0   Q2: Feeling down, depressed or hopeless 0   PHQ-2 Score 0   PHQ-2 Total Score (12-17 Years)- Positive if 3 or more points; Administer PHQ-A if positive -   Q1: Little interest or pleasure in doing things Not at all   Q2: Feeling down, depressed or hopeless Not at all   PHQ-2 Score 0       Abuse: Current or Past (Physical, Sexual or Emotional) - No  Do you feel safe in your environment? Yes        Social History     Tobacco Use     Smoking status: Former Smoker     Types: Cigarettes     Quit date: 2010     Years since quittin.4     Smokeless tobacco: Never Used   Substance Use Topics     Alcohol use: Yes     Comment: occ         Alcohol Use 2022   Prescreen: >3 drinks/day or >7 drinks/week? No   Prescreen: >3 drinks/day or >7 drinks/week? -       Reviewed orders with patient.  Reviewed health maintenance and updated orders accordingly - Yes  Lab work is in process    Breast Cancer Screening:    Breast CA Risk Assessment (FHS-7) 12/3/2021   Do you have a family history of breast, colon, or ovarian cancer? No / Unknown     Mammogram  "Screening: Recommended mammography every 1-2 years with patient discussion and risk factor consideration  Pertinent mammograms are reviewed under the imaging tab.    History of abnormal Pap smear: Status post benign hysterectomy. Health Maintenance and Surgical History updated.  PAP / HPV 1/23/2012 12/22/2008 5/21/2008   PAP (Historical) OTHER-NIL, See Result NIL OTHER-NIL EM>40     Reviewed and updated as needed this visit by clinical staff   Tobacco  Allergies  Meds   Med Hx  Surg Hx  Fam Hx  Soc Hx          Reviewed and updated as needed this visit by Provider                   Review of Systems   Constitutional: Negative for chills and fever.   HENT: Negative for congestion, ear pain, hearing loss and sore throat.    Eyes: Negative for pain and visual disturbance.   Respiratory: Negative for cough and shortness of breath.    Cardiovascular: Negative for chest pain, palpitations and peripheral edema.   Gastrointestinal: Negative for abdominal pain, constipation, diarrhea, heartburn, hematochezia and nausea.   Breasts:  Negative for tenderness, breast mass and discharge.   Genitourinary: Negative for dysuria, frequency, genital sores, hematuria, pelvic pain, urgency, vaginal bleeding and vaginal discharge.   Musculoskeletal: Negative for arthralgias, joint swelling and myalgias.   Skin: Negative for rash.   Neurological: Negative for dizziness, weakness, headaches and paresthesias.   Psychiatric/Behavioral: Negative for mood changes. The patient is not nervous/anxious.         OBJECTIVE:   /80 (BP Location: Right arm, Patient Position: Chair, Cuff Size: Adult Large)   Pulse 68   Temp 98.2  F (36.8  C) (Tympanic)   Resp 14   Ht 1.575 m (5' 2\")   Wt 90.3 kg (199 lb)   LMP 04/05/2012   SpO2 95%   Breastfeeding No   BMI 36.40 kg/m       Physical Exam  GENERAL APPEARANCE: healthy, alert and no distress  EYES: Eyes grossly normal to inspection, PERRL and conjunctivae and sclerae normal  HENT: ear " "canals and TM's normal, nose and mouth without ulcers or lesions, oropharynx clear and oral mucous membranes moist  NECK: no adenopathy, no asymmetry, masses, or scars and thyroid normal to palpation  RESP: lungs clear to auscultation - no rales, rhonchi or wheezes  BREAST: normal without masses, tenderness or nipple discharge and no palpable axillary masses or adenopathy  CV: regular rate and rhythm, normal S1 S2, no S3 or S4, no murmur, click or rub, no peripheral edema and peripheral pulses strong  ABDOMEN: soft, nontender, no hepatosplenomegaly, no masses and bowel sounds normal  MS: no musculoskeletal defects are noted and gait is age appropriate without ataxia  SKIN: no suspicious lesions or rashes  NEURO: Normal strength and tone, sensory exam grossly normal, mentation intact and speech normal  PSYCH: mentation appears normal and affect normal/bright      ASSESSMENT/PLAN:   (Z00.00) Routine general medical examination at a health care facility  (primary encounter diagnosis)  Comment: Routine health visit completed. Discussed diet, exercise, and calcium supplementation.   Healthy female exam completed today. Discussed lifestyle modifications including weight loss/ management, eating a balanced diet, and getting regular cardiovascular exercise. Discussed self breast exams and how to complete these. Labs completed today. Plan yearly annual physicals or sooner as needed.    Plan: REVIEW OF HEALTH MAINTENANCE PROTOCOL ORDERS        (Z13.220) Screening for hyperlipidemia  Plan: Lipid panel reflex to direct LDL Fasting         (Z13.1) Screening for diabetes mellitus  Plan: Glucose          COUNSELING:  Reviewed preventive health counseling, as reflected in patient instructions       Regular exercise       Healthy diet/nutrition       Osteoporosis prevention/bone health    Estimated body mass index is 36.4 kg/m  as calculated from the following:    Height as of this encounter: 1.575 m (5' 2\").    Weight as of this " encounter: 90.3 kg (199 lb).    Weight management plan: Discussed healthy diet and exercise guidelines    She reports that she quit smoking about 12 years ago. Her smoking use included cigarettes. She has never used smokeless tobacco.      Counseling Resources:  ATP IV Guidelines  Pooled Cohorts Equation Calculator  Breast Cancer Risk Calculator  BRCA-Related Cancer Risk Assessment: FHS-7 Tool  FRAX Risk Assessment  ICSI Preventive Guidelines  Dietary Guidelines for Americans, 2010  USDA's MyPlate  ASA Prophylaxis  Lung CA Screening    Camila Forte, STEFANIE CNP  [facility]

## 2022-10-26 ENCOUNTER — ALLIED HEALTH/NURSE VISIT (OUTPATIENT)
Dept: FAMILY MEDICINE | Facility: CLINIC | Age: 57
End: 2022-10-26
Payer: COMMERCIAL

## 2022-10-26 DIAGNOSIS — Z23 NEED FOR VACCINATION: Primary | ICD-10-CM

## 2022-10-26 PROCEDURE — 90471 IMMUNIZATION ADMIN: CPT

## 2022-10-26 PROCEDURE — 99207 PR NO CHARGE NURSE ONLY: CPT

## 2022-10-26 PROCEDURE — 90750 HZV VACC RECOMBINANT IM: CPT

## 2022-11-03 ENCOUNTER — LAB (OUTPATIENT)
Dept: LAB | Facility: CLINIC | Age: 57
End: 2022-11-03
Payer: COMMERCIAL

## 2022-11-03 DIAGNOSIS — Z13.220 SCREENING FOR HYPERLIPIDEMIA: ICD-10-CM

## 2022-11-03 DIAGNOSIS — Z11.4 SCREENING FOR HIV (HUMAN IMMUNODEFICIENCY VIRUS): ICD-10-CM

## 2022-11-03 DIAGNOSIS — Z13.1 SCREENING FOR DIABETES MELLITUS: ICD-10-CM

## 2022-11-03 DIAGNOSIS — Z11.59 NEED FOR HEPATITIS C SCREENING TEST: ICD-10-CM

## 2022-11-03 LAB
CHOLEST SERPL-MCNC: 230 MG/DL
FASTING STATUS PATIENT QL REPORTED: NORMAL
GLUCOSE SERPL-MCNC: 90 MG/DL (ref 70–99)
HCV AB SERPL QL IA: NONREACTIVE
HDLC SERPL-MCNC: 49 MG/DL
HIV 1+2 AB+HIV1 P24 AG SERPL QL IA: NONREACTIVE
LDLC SERPL CALC-MCNC: 144 MG/DL
NONHDLC SERPL-MCNC: 181 MG/DL
TRIGL SERPL-MCNC: 184 MG/DL

## 2022-11-03 PROCEDURE — 87389 HIV-1 AG W/HIV-1&-2 AB AG IA: CPT

## 2022-11-03 PROCEDURE — 80061 LIPID PANEL: CPT

## 2022-11-03 PROCEDURE — 36415 COLL VENOUS BLD VENIPUNCTURE: CPT

## 2022-11-03 PROCEDURE — 82947 ASSAY GLUCOSE BLOOD QUANT: CPT

## 2022-11-03 PROCEDURE — 86803 HEPATITIS C AB TEST: CPT

## 2022-11-20 ENCOUNTER — HEALTH MAINTENANCE LETTER (OUTPATIENT)
Age: 57
End: 2022-11-20

## 2023-01-05 ENCOUNTER — ALLIED HEALTH/NURSE VISIT (OUTPATIENT)
Dept: FAMILY MEDICINE | Facility: CLINIC | Age: 58
End: 2023-01-05
Payer: COMMERCIAL

## 2023-01-05 DIAGNOSIS — Z23 NEED FOR VACCINATION: Primary | ICD-10-CM

## 2023-01-05 PROCEDURE — 99207 PR NO CHARGE NURSE ONLY: CPT

## 2023-01-05 PROCEDURE — 90750 HZV VACC RECOMBINANT IM: CPT

## 2023-01-05 PROCEDURE — 90471 IMMUNIZATION ADMIN: CPT

## 2023-03-13 ENCOUNTER — TRANSFERRED RECORDS (OUTPATIENT)
Dept: HEALTH INFORMATION MANAGEMENT | Facility: CLINIC | Age: 58
End: 2023-03-13

## 2023-04-03 ENCOUNTER — OFFICE VISIT (OUTPATIENT)
Dept: DERMATOLOGY | Facility: CLINIC | Age: 58
End: 2023-04-03
Payer: COMMERCIAL

## 2023-04-03 DIAGNOSIS — D22.9 NEVUS: ICD-10-CM

## 2023-04-03 DIAGNOSIS — L82.0 INFLAMED SEBORRHEIC KERATOSIS: ICD-10-CM

## 2023-04-03 DIAGNOSIS — L30.1 DYSHIDROSIS: ICD-10-CM

## 2023-04-03 DIAGNOSIS — D23.9 DERMAL NEVUS: ICD-10-CM

## 2023-04-03 DIAGNOSIS — D18.01 ANGIOMA OF SKIN: ICD-10-CM

## 2023-04-03 DIAGNOSIS — L82.1 SEBORRHEIC KERATOSIS: ICD-10-CM

## 2023-04-03 DIAGNOSIS — L81.4 LENTIGO: Primary | ICD-10-CM

## 2023-04-03 PROCEDURE — 17110 DESTRUCTION B9 LES UP TO 14: CPT | Performed by: DERMATOLOGY

## 2023-04-03 PROCEDURE — 99213 OFFICE O/P EST LOW 20 MIN: CPT | Mod: 25 | Performed by: DERMATOLOGY

## 2023-04-03 RX ORDER — BETAMETHASONE DIPROPIONATE 0.5 MG/G
CREAM TOPICAL
Qty: 100 G | Refills: 3 | Status: SHIPPED | OUTPATIENT
Start: 2023-04-03

## 2023-04-03 ASSESSMENT — PAIN SCALES - GENERAL: PAINLEVEL: NO PAIN (0)

## 2023-04-03 NOTE — PROGRESS NOTES
Shae Pierre is an extremely pleasant 58 year old year old female patient here today for rough spot son shoulders and hands.   .   Patient states this has been present for a while.  Patient reports the following symptoms:  itching.  Patient reports the following previous treatments none.  These treatments did not work.  Patient reports the following modifying factors none.  Associated symptoms: none.  Patient has no other skin complaints today.  Remainder of the HPI, Meds, PMH, Allergies, FH, and SH was reviewed in chart.      Past Medical History:   Diagnosis Date     Dysplasia of cervix (uteri) 1998     Fibroids 2/22/2012     Other abnormal Pap smear and cervical HPV (human papapillomavirus) 9/29/2008    EM>40 and pos HPV unknown type rec rpt pap 6 mos/11/08  1/26/2009 F/u pap was normal  Will have next pap in 1 year.      Other acne      PONV (postoperative nausea and vomiting)        Past Surgical History:   Procedure Laterality Date     COLONOSCOPY N/A 04/16/2018    Procedure: COLONOSCOPY;  Colonoscopy;  Surgeon: Chuck Donovan MD;  Location: [facility]     EXCISE MASS TRUNK  07/13/2012    Procedure: EXCISE MASS TRUNK;  Excisional Removal Chest Wall Cysts X2;  Surgeon: Dennys Henriquez MD;  Location: [facility]     HYSTERECTOMY, PAP NO LONGER INDICATED       LAPAROSCOPIC ASSISTED HYSTERECTOMY VAGINAL, BILATERAL SALPINGO-OOPHORECTOMY, COMBINED  04/05/2012    LAVH-BSO     LAPAROSCOPIC CHOLECYSTECTOMY N/A 11/17/2021    Procedure: CHOLECYSTECTOMY, LAPAROSCOPIC;  Surgeon: Chip Santana MD;  Location: [facility]     SURGICAL HISTORY OF -       LEEP     SURGICAL HISTORY OF -       Cryotherapy      SURGICAL HISTORY OF -       Colpo     SURGICAL HISTORY OF -  Left     meniscal injury        Family History   Problem Relation Age of Onset     Breast Cancer Maternal Grandmother      Hypertension Father      C.A.D. Father      Alzheimer Disease Father      Lipids Brother      Heart Disease Brother 58        MI     Lipids  Brother      Heart Disease Brother 60        MI     Lipids Brother      Lipids Brother      Lipids Sister      Lipids Sister      Circulatory Mother         AAA     Chronic Obstructive Pulmonary Disease Mother      No Known Problems Daughter      Breast Cancer Maternal Aunt      No Known Problems Daughter        Social History     Socioeconomic History     Marital status:      Spouse name: Not on file     Number of children: Not on file     Years of education: Not on file     Highest education level: Not on file   Occupational History     Occupation:      Employer: [employer]     Comment: [employer]   Tobacco Use     Smoking status: Former     Types: Cigarettes     Quit date: 2010     Years since quittin.0     Smokeless tobacco: Never   Vaping Use     Vaping status: Never Used   Substance and Sexual Activity     Alcohol use: Yes     Comment: occ     Drug use: No     Sexual activity: Not Currently     Partners: Male   Other Topics Concern     Parent/sibling w/ CABG, MI or angioplasty before 65F 55M? No   Social History Narrative     Not on file     Social Determinants of Health     Financial Resource Strain: Not on file   Food Insecurity: Not on file   Transportation Needs: Not on file   Physical Activity: Not on file   Stress: Not on file   Social Connections: Not on file   Intimate Partner Violence: Not on file   Housing Stability: Not on file       Outpatient Encounter Medications as of 4/3/2023   Medication Sig Dispense Refill     ADVIL 200 MG OR TABS TAKE 1 TO 2 TABLETS EVERY 4 TO 6 HOURS AS NEEDED WITH FOOD 120 0     docusate sodium (COLACE) 100 MG capsule Take 1 capsule (100 mg) by mouth 2 times daily (Patient not taking: No sig reported) 60 capsule 1     GLUCOSAMINE SULFATE PO Take 500 mg by mouth daily       latanoprost (XALATAN) 0.005 % ophthalmic solution Place 1 drop into each eye EVERY EVENING       MULTI-VITAMIN OR TABS 1 TABLET DAILY 30 0      Omega-3 Fatty Acids (FISH OIL) 435 MG CAPS        SUMAtriptan (IMITREX) 100 MG tablet Take 1 tablet (100 mg) by mouth at onset of headache for migraine May repeat in 2 hours prn: max 2/day 12 tablet 4     No facility-administered encounter medications on file as of 4/3/2023.             O:   NAD, WDWN, Alert & Oriented, Mood & Affect wnl, Vitals stable   Here today alone   General appearance normal   Vitals stable   Alert, oriented and in no acute distress     Inflamed seborrheic keratosis on shoulders  Hands dyshidrotic eczema    Pigmented macules on trunk and ext with regular borders and pigment networks  Stuck on papules and brown macules on trunk and ext   Red papules on trunk  Flesh colored papules on trunk     The remainder of the full exam was normal; the following areas were examined:  conjunctiva/lids, , neck, peripheral vascular system, abdomen, lymph nodes, digits/nails, eccrine and apocrine glands, scalp/hair, face, neck, chest, abdomen, buttocks, back, RUE, LUE, RLE, LLE       Eyes: Conjunctivae/lids:Normal     ENT: Lips, buccal mucosa, tongue: normal    MSK:Normal    Cardiovascular: peripheral edema none    Pulm: Breathing Normal    Lymph Nodes: No Head and Neck Lymphadenopathy     Neuro/Psych: Orientation:Alert and Orientedx3 ; Mood/Affect:normal       A/P:  1. Seborrheic keratosis, lentigo, angioma, dermal nevus, nevi   2. Inflamed seborrheic keratosis   R shoulder x5, l shoulder x4  LN2:  Treated with LN2 for 5s for 1-2 cycles. Warned risks of blistering, pain, pigment change, scarring, and incomplete resolution.  Advised patient to return if lesions do not completely resolve.  Wound care sheet given.  3. Dyshidrotic hand eczema  Betamethasone prn   Skin care and emollient use discussed with patient   It was a pleasure speaking to Shae Pierre today.  Previous clinic notes and pertinent laboratory tests were reviewed prior to Shae Pierre's visit.  Nature of benign skin lesions dicussed with  patient.  Signs and Symptoms of skin cancer discussed with patient.  Patient encouraged to perform monthly skin exams.  UV precautions reviewed with patient.  Return to clinic 12 months

## 2023-04-03 NOTE — LETTER
4/3/2023         RE: Shae Pierre  [address] [address] [place] [id number]        Dear Colleague,    Thank you for referring your patient, Shae Pierre, to the [facility]. Please see a copy of my visit note below.    Shae Pierre is an extremely pleasant 58 year old year old female patient here today for rough spot son shoulders and hands.   .   Patient states this has been present for a while.  Patient reports the following symptoms:  itching.  Patient reports the following previous treatments none.  These treatments did not work.  Patient reports the following modifying factors none.  Associated symptoms: none.  Patient has no other skin complaints today.  Remainder of the HPI, Meds, PMH, Allergies, FH, and SH was reviewed in chart.      Past Medical History:   Diagnosis Date     Dysplasia of cervix (uteri) 1998     Fibroids 2/22/2012     Other abnormal Pap smear and cervical HPV (human papapillomavirus) 9/29/2008    EM>40 and pos HPV unknown type rec rpt pap 6 mos/11/08  1/26/2009 F/u pap was normal  Will have next pap in 1 year.      Other acne      PONV (postoperative nausea and vomiting)        Past Surgical History:   Procedure Laterality Date     COLONOSCOPY N/A 04/16/2018    Procedure: COLONOSCOPY;  Colonoscopy;  Surgeon: Chuck Donovan MD;  Location: [facility]     EXCISE MASS TRUNK  07/13/2012    Procedure: EXCISE MASS TRUNK;  Excisional Removal Chest Wall Cysts X2;  Surgeon: Dennys Henriquez MD;  Location: [facility]     HYSTERECTOMY, PAP NO LONGER INDICATED       LAPAROSCOPIC ASSISTED HYSTERECTOMY VAGINAL, BILATERAL SALPINGO-OOPHORECTOMY, COMBINED  04/05/2012    LAVH-BSO     LAPAROSCOPIC CHOLECYSTECTOMY N/A 11/17/2021    Procedure: CHOLECYSTECTOMY, LAPAROSCOPIC;  Surgeon: Chip Santana MD;  Location: [facility]     SURGICAL HISTORY OF -       LEEP     SURGICAL HISTORY OF -       Cryotherapy      SURGICAL HISTORY OF -       Colpo     SURGICAL HISTORY OF -  Left     meniscal  injury        Family History   Problem Relation Age of Onset     Breast Cancer Maternal Grandmother      Hypertension Father      C.A.D. Father      Alzheimer Disease Father      Lipids Brother      Heart Disease Brother 58        MI     Lipids Brother      Heart Disease Brother 60        MI     Lipids Brother      Lipids Brother      Lipids Sister      Lipids Sister      Circulatory Mother         AAA     Chronic Obstructive Pulmonary Disease Mother      No Known Problems Daughter      Breast Cancer Maternal Aunt      No Known Problems Daughter        Social History     Socioeconomic History     Marital status:      Spouse name: Not on file     Number of children: Not on file     Years of education: Not on file     Highest education level: Not on file   Occupational History     Occupation:      Employer: [employer]     Comment: [employer]   Tobacco Use     Smoking status: Former     Types: Cigarettes     Quit date: 2010     Years since quittin.0     Smokeless tobacco: Never   Vaping Use     Vaping status: Never Used   Substance and Sexual Activity     Alcohol use: Yes     Comment: occ     Drug use: No     Sexual activity: Not Currently     Partners: Male   Other Topics Concern     Parent/sibling w/ CABG, MI or angioplasty before 65F 55M? No   Social History Narrative     Not on file     Social Determinants of Health     Financial Resource Strain: Not on file   Food Insecurity: Not on file   Transportation Needs: Not on file   Physical Activity: Not on file   Stress: Not on file   Social Connections: Not on file   Intimate Partner Violence: Not on file   Housing Stability: Not on file       Outpatient Encounter Medications as of 4/3/2023   Medication Sig Dispense Refill     ADVIL 200 MG OR TABS TAKE 1 TO 2 TABLETS EVERY 4 TO 6 HOURS AS NEEDED WITH FOOD 120 0     docusate sodium (COLACE) 100 MG capsule Take 1 capsule (100 mg) by mouth 2 times daily (Patient not  taking: No sig reported) 60 capsule 1     GLUCOSAMINE SULFATE PO Take 500 mg by mouth daily       latanoprost (XALATAN) 0.005 % ophthalmic solution Place 1 drop into each eye EVERY EVENING       MULTI-VITAMIN OR TABS 1 TABLET DAILY 30 0     Omega-3 Fatty Acids (FISH OIL) 435 MG CAPS        SUMAtriptan (IMITREX) 100 MG tablet Take 1 tablet (100 mg) by mouth at onset of headache for migraine May repeat in 2 hours prn: max 2/day 12 tablet 4     No facility-administered encounter medications on file as of 4/3/2023.             O:   NAD, WDWN, Alert & Oriented, Mood & Affect wnl, Vitals stable   Here today alone   General appearance normal   Vitals stable   Alert, oriented and in no acute distress     Inflamed seborrheic keratosis on shoulders  Hands dyshidrotic eczema    Pigmented macules on trunk and ext with regular borders and pigment networks  Stuck on papules and brown macules on trunk and ext   Red papules on trunk  Flesh colored papules on trunk     The remainder of the full exam was normal; the following areas were examined:  conjunctiva/lids, , neck, peripheral vascular system, abdomen, lymph nodes, digits/nails, eccrine and apocrine glands, scalp/hair, face, neck, chest, abdomen, buttocks, back, RUE, LUE, RLE, LLE       Eyes: Conjunctivae/lids:Normal     ENT: Lips, buccal mucosa, tongue: normal    MSK:Normal    Cardiovascular: peripheral edema none    Pulm: Breathing Normal    Lymph Nodes: No Head and Neck Lymphadenopathy     Neuro/Psych: Orientation:Alert and Orientedx3 ; Mood/Affect:normal       A/P:  1. Seborrheic keratosis, lentigo, angioma, dermal nevus, nevi   2. Inflamed seborrheic keratosis   R shoulder x5, l shoulder x4  LN2:  Treated with LN2 for 5s for 1-2 cycles. Warned risks of blistering, pain, pigment change, scarring, and incomplete resolution.  Advised patient to return if lesions do not completely resolve.  Wound care sheet given.  3. Dyshidrotic hand eczema  Betamethasone prn   Skin care and  emollient use discussed with patient   It was a pleasure speaking to Shae Pierre today.  Previous clinic notes and pertinent laboratory tests were reviewed prior to Shae Pierre's visit.  Nature of benign skin lesions dicussed with patient.  Signs and Symptoms of skin cancer discussed with patient.  Patient encouraged to perform monthly skin exams.  UV precautions reviewed with patient.  Return to clinic 12 months        Again, thank you for allowing me to participate in the care of your patient.        Sincerely,        Steffen Maldonado MD

## 2023-06-01 ENCOUNTER — OFFICE VISIT (OUTPATIENT)
Dept: FAMILY MEDICINE | Facility: CLINIC | Age: 58
End: 2023-06-01
Payer: COMMERCIAL

## 2023-06-01 VITALS
DIASTOLIC BLOOD PRESSURE: 86 MMHG | BODY MASS INDEX: 37.06 KG/M2 | WEIGHT: 201.4 LBS | HEIGHT: 62 IN | OXYGEN SATURATION: 96 % | RESPIRATION RATE: 16 BRPM | SYSTOLIC BLOOD PRESSURE: 126 MMHG | HEART RATE: 74 BPM

## 2023-06-01 DIAGNOSIS — L30.4 INTERTRIGO: Primary | ICD-10-CM

## 2023-06-01 DIAGNOSIS — E66.812 CLASS 2 OBESITY WITH BODY MASS INDEX (BMI) OF 36.0 TO 36.9 IN ADULT, UNSPECIFIED OBESITY TYPE, UNSPECIFIED WHETHER SERIOUS COMORBIDITY PRESENT: ICD-10-CM

## 2023-06-01 PROCEDURE — 99213 OFFICE O/P EST LOW 20 MIN: CPT | Performed by: FAMILY MEDICINE

## 2023-06-01 RX ORDER — NYSTATIN 100000 [USP'U]/G
POWDER TOPICAL 2 TIMES DAILY
Qty: 60 G | Refills: 1 | Status: SHIPPED | OUTPATIENT
Start: 2023-06-01 | End: 2024-07-10

## 2023-06-01 RX ORDER — CLOTRIMAZOLE 1 %
CREAM (GRAM) TOPICAL 2 TIMES DAILY
Qty: 113 G | Refills: 1 | Status: SHIPPED | OUTPATIENT
Start: 2023-06-01

## 2023-06-01 ASSESSMENT — PAIN SCALES - GENERAL: PAINLEVEL: MILD PAIN (2)

## 2023-06-01 NOTE — PROGRESS NOTES
Assessment & Plan     Intertrigo  Keep area dry, can use cool blow dryer to area after shower and clean dry towels.  Discussed not using Neosporin over area.  Discussed symptoms to monitor for.  Unfortunately has had recurrent infections in this area may benefit from meeting with plastic surgery to discuss surgical options  - nystatin (MYCOSTATIN) [id number] UNIT/GM external powder  Dispense: 60 g; Refill: 1  - clotrimazole (LOTRIMIN) 1 % external cream  Dispense: 113 g; Refill: 1  - Adult Plastic Surgery  Referral    Class 2 obesity with body mass index (BMI) of 36.0 to 36.9 in adult, unspecified obesity type, unspecified whether serious comorbidity present  Has done comprehensive lifestyle plan through her insurance has no success in losing weight.  Would be interested in meeting with a metabolic clinic  - Adult Comprehensive Weight Management  Referral  - Adult Plastic Surgery  Referral      25 minutes spent by me on the date of the encounter doing chart review, patient visit and documentation       TESSA HE [facility]   Shae is a 58 year old, presenting for the following health issues:  Derm Problem        6/1/2023     2:14 PM   Additional Questions   Roomed by Krista FLEMING MA   Accompanied by Self     History of Present Illness       Reason for visit:  Rash  Symptom onset:  1-3 days ago  Symptoms include:  Rash  Symptom intensity:  Severe  Symptom progression:  Worsening  Had these symptoms before:  Yes  Has tried/received treatment for these symptoms:  Yes  Previous treatment was successful:  Yes  Prior treatment description:  Medication  What makes it worse:  Heat  What makes it better:  No    She eats 0-1 servings of fruits and vegetables daily.She consumes 3 sweetened beverage(s) daily.She exercises with enough effort to increase her heart rate 30 to 60 minutes per day.  She exercises with enough effort to increase her heart rate 5  "days per week.   She is taking medications regularly.     Rash  Onset/Duration: Couple days  Description  Location: Lower abdomen  Character: painful, burning, red  Itching: mild  Intensity:  severe  Progression of Symptoms:  worsening  Accompanying signs and symptoms:   Fever: No  Body aches or joint pain: No  Sore throat symptoms: No  Recent cold symptoms: No  History:           Previous episodes of similar rash: Yes  New exposures:  None  Recent travel: No  Exposure to similar rash: No  Precipitating or alleviating factors: heat makes it worse  Therapies tried and outcome: baby powder, neosporin    All wait has gone to her stomach, feels that her stomach hangs down, rash is below    Review of Systems   Constitutional, HEENT, cardiovascular, pulmonary, gi and gu systems are negative, except as otherwise noted.      Objective    /86 (BP Location: Right arm, Patient Position: Chair, Cuff Size: Adult Large)   Pulse 74   Resp 16   Ht 1.575 m (5' 2\")   Wt 91.4 kg (201 lb 6.4 oz)   LMP 04/05/2012   SpO2 96%   BMI 36.84 kg/m    Body mass index is 36.84 kg/m .  Physical Exam  Constitutional:       Appearance: Normal appearance.   Cardiovascular:      Rate and Rhythm: Normal rate and regular rhythm.   Pulmonary:      Effort: Pulmonary effort is normal.   Musculoskeletal:      Right lower leg: No edema.      Left lower leg: No edema.   Neurological:      Mental Status: She is alert.   Psychiatric:         Thought Content: Thought content normal.         Judgment: Judgment normal.                    "

## 2023-10-04 ENCOUNTER — ANCILLARY PROCEDURE (OUTPATIENT)
Dept: MAMMOGRAPHY | Facility: CLINIC | Age: 58
End: 2023-10-04
Payer: COMMERCIAL

## 2023-10-04 DIAGNOSIS — Z12.31 VISIT FOR SCREENING MAMMOGRAM: ICD-10-CM

## 2023-10-04 PROCEDURE — 77063 BREAST TOMOSYNTHESIS BI: CPT | Mod: TC | Performed by: RADIOLOGY

## 2023-10-04 PROCEDURE — 77067 SCR MAMMO BI INCL CAD: CPT | Mod: TC | Performed by: RADIOLOGY

## 2023-11-19 ENCOUNTER — HEALTH MAINTENANCE LETTER (OUTPATIENT)
Age: 58
End: 2023-11-19

## 2024-01-09 NOTE — PROGRESS NOTES
"Video-Visit Details    Type of service:  Video Visit    Video Start Time: 8:58 AM   Video End Time: 9:24 AM     Originating Location (pt. Location): Home    Distant Location (provider location): Offsite (providers home) [facility] [place]     Platform used for Video Visit: [employer]    New Weight Management Nutrition Consultation    Shae Pierre is a 58 year old female presents today for new weight management nutrition consultation.  Patient referred by Lianet Davey on January 10, 2024.    Patient with Co-morbidities of obesity includin/9/2024     7:57 AM   --   I have the following health issues associated with obesity High Blood Pressure    Infertility   I have the following symptoms associated with obesity Knee Pain    Infertility (Difficulty Getting Pregnant)    Lower Extremity Swelling    Fatigue    Groin Rash    Hip Pain     Anthropometrics:  Initial consult weight 193 lb on 1/10/24.   Estimated body mass index is 36.47 kg/m  as calculated from the following:    Height as of an earlier encounter on 1/10/24: 1.549 m (5' 1\").    Weight as of an earlier encounter on 1/10/24: 87.5 kg (193 lb).    Medications for Weight Loss:  Start Zepbound if approved by insurance.   Consider Wegovy and Saxenda as needed.   If not covered will start Naltrexone     Occupation: Full-time , works around 60 hrs a week.     NUTRITION HISTORY  Food allergies: NKFA  Food intolerances: None   Vitamin/Mineral Supplements: Fish Oil, Glucosamine, Centrum MVI, Probiotic   Previous methods of diet modification for weight loss: watching portions/calories, weight watchers, Margie Ritchie, exercise   RD before: None     Diet recall:   Days varies   Couple of times a week has breakfast - coffee, toast, eggs, breakfast sandwiches, cope   Lunch - something quick, microwave pot pie, salad, soup  Dinner - fast quick food, pizza, similar to lunch items.   Snack - handful of almonds, " apple   Hydration: coffee, can of regular orange or root beer soda, diet coke, water (128 oz)          1/9/2024     7:57 AM   Diet Recall Review with Patient   If you do eat lunch, what types of food do you typically eat? sandwich, soup- something quick   If you do eat supper, what types of food do you typically eat? frozen pizza- something quick   If you do snack, what types of food do you typically eat? Almonds   How many glasses of juice do you drink in a typical day? 8   How many of glasses of milk do you drink in a typical day? 0   How many 8oz glasses of sugar containing drinks such as Solitario-Aid/sweet tea do you drink in a day? 0   How many cans/bottles of sugar pop/soda/tea/sports drinks do you drink in a day? 0   How many cans/bottles of diet pop/soda/tea or sports drink do you drink in a day? 3   How often do you have a drink of alcohol? 2-3 TImes a Week   If you do drink, how many drinks might you have in a day? 1 or 2         1/9/2024     7:57 AM   Eating Habits   Generally, my meals include foods like these bread, pasta, rice, potatoes, corn, crackers, sweet dessert, pop, or juice Almost Everyday   Generally, my meals include foods like these fried meats, brats, burgers, french fries, pizza, cheese, chips, or ice cream A Few Times a Week   Eat fast food (like McDonalds, Burger Anton, Taco Bell) Less Than Weekly   Eat at a buffet or sit-down restaurant Less Than Weekly   Eat most of my meals in front of the TV or computer A Few Times a Week   Often skip meals, eat at random times, have no regular eating times A Few Times a Week   Rarely sit down for a meal but snack or graze throughout Less Than Weekly   Eat extra snacks between meals Less Than Weekly   Eat most of my food at the end of the day Less Than Weekly   Eat in the middle of the night or wake up at night to eat Never   Eat extra snacks to prevent or correct low blood sugar Less Than Weekly   Eat to prevent acid reflux or stomach pain Less Than  Weekly   Worry about not having enough food to eat Never   I eat when I am depressed Less Than Weekly   I eat when I am stressed Less Than Weekly   I eat when I am bored Less Than Weekly   I eat when I am anxious Less Than Weekly   I eat when I am happy or as a reward Less Than Weekly   I feel hungry all the time even if I just have eaten Less Than Weekly   Feeling full is important to me Less Than Weekly   I finish all the food on my plate even if I am already full Less Than Weekly   I can't resist eating delicious food or walk past the good food/smell Less Than Weekly   I eat/snack without noticing that I am eating Less Than Weekly   I eat when I am preparing the meal Less Than Weekly   I eat more than usual when I see others eating Less Than Weekly   I have trouble not eating sweets, ice cream, cookies, or chips if they are around the house Less Than Weekly   I think about food all day Less Than Weekly   What foods, if any, do you crave? Cheese         1/9/2024     7:57 AM   Amount of Food   I feel out of control when eating Never   I eat a large amount of food, like a loaf of bread, a box of cookies, a pint/quart of ice cream, all at once Never   I eat a large amount of food even when I am not hungry Never   I eat rapidly Never   I eat alone because I feel embarrassed and do not want others to see how much I have eaten Never   I eat until I am uncomfortably full Never   I feel bad, disgusted, or guilty after I overeat Never     Physical Activity:  Did not discuss         1/9/2024     7:57 AM   Activity/Exercise History   How much of a typical 12 hour day do you spend sitting? Most of the Day   How much of a typical 12 hour day do you spend lying down? Less Than Half the Day   How much of a typical day do you spend walking/standing? Less Than Half the Day   How many hours (not including work) do you spend on the TV/Video Games/Computer/Tablet/Phone? 1 Hour or Less   How many times a week are you active for the  purpose of exercise? 2-3 Times a Week   What keeps you from being more active? Pain    Lack of Time    Too tired   How many total minutes do you spend doing some activity for the purpose of exercising when you exercise? More Than 30 Minutes     Nutrition Prescription  Recommended energy/nutrient modification.    Nutrition Diagnosis  Obesity r/t long history of positive energy balance aeb BMI >30.    Nutrition Intervention  Materials/education provided on hypocaloric diet for weight loss. Discussed volumetric eating to help satiety level on fewer calories; portion control and healthy food choices (Plate Method and Volumetrics handouts), sample meal ideas. Patient demonstrates understanding. Co-developed goals to work towards. Provided pt with list of goals and resources below via [employer]EG[employer]S.     Expected Engagement: good  Follow-Up Plans: adherence to diet recommendations.      Nutrition Goals  1) Implement the plate method as a guide for balanced meals.  2) Have a good source of protein at all meals and aim for at least 60 grams per day. Check out protein resources below.   3) Try to meal plan on the weekend for either breakfast or lunch meals.     The Plate Method:  Plate method can be used for general guidance on balanced meals/portion sizes (see link below for picture/more information)                 Make 1/2 your plate non starchy vegetables (cauliflower, broccoli, asparagus, [place] sprouts, lettuce, carrots, for example).                3+ oz lean protein sources (salmon/skinless chicken/turkey breast/pork loin/lean cuts of beef/ or non-animal protein such as black, kidney or davila beans, tofu/edamame/tempeh)    (Note: 3 oz = deck of cards size)                1/2 to 1 cup carbohydrate choices such as whole grain starches or starchy vegetables or fruit. For example: quinoa, brown rice, barley, potatoes, sweet potatoes, winter squash, peas, corn, or fruit.                Choose ~0-2 added fat servings at a meal  "(avocados, nut butters, nuts or seeds, olive oil, vegetable oils).    [email or web address]    Protein Sources   [email or web address]     Quick/Easy Protein Sources:  Hard boiled eggs  Part-skim cheese sticks  Baby Bell cheese rounds  Low-fat/low-sugar Greek yogurt  Low-fat cottage cheese  Lean deli meat (chicken/turkey/ham)  Roasted chickpeas or lentils  Nuts   Turkey meat stick  Protein shake/bar  \"P3\" snack (cheese, nuts, deli meat)  Aldi's \"Protein Bread\"   \"Egglife\" egg white wrap    Tuna/salmon can/packet     Carbohydrates  [email or web address]     Mindful Eating  [email or web address]     Summary of Volumetrics Eating Plan  [email or web address]     Mini egg muffins   https://www.GOVECS/recipe/166628/scrambled-egg-muffins/    Sheet Pan Dinner Recipes  https://Yapert.Mambu/?s=sheet+pan+dinners  https://www.Logicbroker/food-cooking/meals-menus/y19112247/sheet-pan-dinner-recipes/     Marinades for chicken, steak and fish  https://Innovate Wireless Health/10-healthy-chicken-marinades-whole30/  https://www.Vionic.Mambu/best-steak-marinades/  https://Sharetivity.com/seafood/recipes/marinades     Recipe Ideas  -https://www.Prescription Eyewear.Mambu/category/0-zjljjfwwlp-parzxjb/  -https://www.GenieTown/recipes/pesto-pasta-with-chicken-and-tomatoes/0454762w-49h4-15c8-66i4-5025u50cv40t   -https://www.Infectious.com/recipes/photos/things-to-make-with-pesto  -https://www.Druva.Mambu/collection/sheet-pan-supper-recipes/   -https://www.myplate.gov/recipes/supplemental-nutrition-assistance-program-snap/fruit-kabobs-yogurt-dip   -https://Somonic Solutions.Mambu/rice-recipes/     Frozen Meal Ideas:   Lean Cuisine   Green Giant [place] Protein Bowls   Frontera Chicken & Chorizo Taco Bowl  Healthy Choice Simply Steamers  Garden Lites Veggvirginia Made Great  Healthy Choice Power Bowls  Lean Cuisine  Smart Ones  Justice Casillas    Follow-Up: February 21. "     Time spent with patient: 26 minutes.  Jo Ann King RD, LD

## 2024-01-10 ENCOUNTER — VIRTUAL VISIT (OUTPATIENT)
Dept: ENDOCRINOLOGY | Facility: CLINIC | Age: 59
End: 2024-01-10
Payer: COMMERCIAL

## 2024-01-10 ENCOUNTER — TELEPHONE (OUTPATIENT)
Dept: ENDOCRINOLOGY | Facility: CLINIC | Age: 59
End: 2024-01-10

## 2024-01-10 VITALS — WEIGHT: 193 LBS | HEIGHT: 61 IN | BODY MASS INDEX: 36.44 KG/M2

## 2024-01-10 DIAGNOSIS — E66.812 CLASS 2 SEVERE OBESITY WITH SERIOUS COMORBIDITY AND BODY MASS INDEX (BMI) OF 36.0 TO 36.9 IN ADULT, UNSPECIFIED OBESITY TYPE (H): Primary | ICD-10-CM

## 2024-01-10 DIAGNOSIS — Z71.3 NUTRITIONAL COUNSELING: Primary | ICD-10-CM

## 2024-01-10 DIAGNOSIS — E66.01 CLASS 2 SEVERE OBESITY WITH SERIOUS COMORBIDITY AND BODY MASS INDEX (BMI) OF 36.0 TO 36.9 IN ADULT, UNSPECIFIED OBESITY TYPE (H): ICD-10-CM

## 2024-01-10 DIAGNOSIS — E66.01 CLASS 2 SEVERE OBESITY WITH SERIOUS COMORBIDITY AND BODY MASS INDEX (BMI) OF 36.0 TO 36.9 IN ADULT, UNSPECIFIED OBESITY TYPE (H): Primary | ICD-10-CM

## 2024-01-10 DIAGNOSIS — E66.812 CLASS 2 SEVERE OBESITY WITH SERIOUS COMORBIDITY AND BODY MASS INDEX (BMI) OF 36.0 TO 36.9 IN ADULT, UNSPECIFIED OBESITY TYPE (H): ICD-10-CM

## 2024-01-10 PROCEDURE — 99215 OFFICE O/P EST HI 40 MIN: CPT | Mod: 95

## 2024-01-10 PROCEDURE — 97802 MEDICAL NUTRITION INDIV IN: CPT | Mod: 95

## 2024-01-10 PROCEDURE — 99417 PROLNG OP E/M EACH 15 MIN: CPT | Mod: 95

## 2024-01-10 PROCEDURE — 99207 PR NO CHARGE LOS: CPT | Mod: 95

## 2024-01-10 ASSESSMENT — PAIN SCALES - GENERAL: PAINLEVEL: NO PAIN (0)

## 2024-01-10 NOTE — NURSING NOTE
Is the patient currently in the state of [place]? YES    Visit mode:VIDEO    If the visit is dropped, the patient can be reconnected by: VIDEO VISIT: Send to e-mail at: [email or web address]    Will anyone else be joining the visit? NO  (If patient encounters technical issues they should call [phone]    How would you like to obtain your AVS? MyChart    Are changes needed to the allergy or medication list? N/A    Reason for visit: Consult    Hemant TREVINO

## 2024-01-10 NOTE — PATIENT INSTRUCTIONS
"Nathaniel Kaufman, it was nice to meet you today!  Thank you for allowing us the privilege of caring for you. We hope we provided you with the excellent service you deserve.   Please let us know if there is anything else we can do for you so that we can be sure you are completely satisfied with your care experience.    To ensure the quality of our services you may be receiving a patient satisfaction survey from an independent patient satisfaction monitoring company.    The greatest compliment you can give is a \"Likely to Recommend\"    Your visit was with Lianet Davey PA-C today.    Instructions per today's visit:     Start Zepbound 2.5mg once weekly for 4 weeks, then increase to 5.0mg once weekly. Consider Wegovy and Saxenda as needed. If not covered will start Naltrexone.   Labs ordered  Follow up with [facility] pharmacist in 6 weeks   Follow up with Lianet Lara in 3 months   Dietitian as needed     ___________________________________________________________________________  Important contact and scheduling information:  Please call our contact center at [phone] to schedule your next appointments.  For any nursing questions or concerns call Zuleyma Patel LPN at [phone] or Sharon Hartley RN at [phone]  Please call during clinic hours Monday through Friday 8:00a - 4:00p if you have questions or you can contact us via [email or web address] at anytime and we will reply during clinic hours.    Lab results will be communicated through My Chart or letter (if My Chart not used). Please call the clinic if you have not received communication after 1 week or if you have any questions.?  Clinic Fax: [phone]  __________________________________________________________________________    If labs were ordered today:    Please make an appointment to have them drawn at your convenience.     To schedule the Lab Appointment using [employer]:  Select \"Schedule an Appointment\"  Select \"Lab Only\"  For \"A couple of questions\", select \"Other\"  For " "\"Which locations work for you?, select the location and set up the appointment    To schedule by phone call [phone] to schedule a lab only appointment at any [facility] lab.  ___________________________________________________________________________  Work with A Health !  Virtual Sessions are Available through [facility]    To learn more, call to schedule a free, Health  Q&A appointment: [phone]     What is Health Coaching?  Do you know what you are supposed to do, but you just aren't doing it?  Then, HEALTH COACHING may help you!   Get unstuck and move forward with the support of a professionally trained NBC-HWC (National Board-Certified Health and ) who uses evidence-based approaches to help you move forward with healthy lifestyle changes in the areas of weight loss, stress management and overall well-being.    Health Coaches help you identify goals that will work best for you. Health Coaches provide support and encouragement with overcoming barriers and help you to find inspiration and motivation to lead a healthy lifestyle.    Option one:  Health Coaching 3-Pack; Three, 30-minute Health Coaching Visits, for $99  Visits are done virtually (phone or video)  This is a self pay service; we do not accept insurance for humaira coaching.    Option two:   The 24 week Plan; 11 Health Coaching Visits, and a 7 months subscription to [employer]-- on-demand fitness, nutrition and mindfulness classes, for $499 (employee discounts may be available). Participants will also meet regularly with a weight management Medical Provider and a Registered/Licensed Dietician.  This is a self-pay service; we do not accept insurance for health coaching.    To Schedule a free Health  Q&A appointment to learn more,  call [phone].  ____________________________________________________________________  [facility]" "[place]  Healthy Lifestyle Group    Healthy Lifestyle Group  This is a 60 minute virtual coaching group for those who want to lead a healthier lifestyle. Come together to set goals and overcome barriers in a supportive group environment. We will address the four pillars of health--nutrition, exercise, sleep and emotional well-being.  This group is highly recommended for those who are participating in the 24 week Healthy Lifestyle Plan and our Health Coaching sessions.    WHEN: This group meets the first Friday of the month, 12:30 PM - 1:30 PM online, via a zoom meeting.      FACILITATOR: Led by National Board Certified Health and , Miranda Strauss [facility].    TO REGISTER: Please call the Call Center at [phone] to register. You will get an appointment to attend in [place] [facility]. Fifteen minutes prior to the meeting, complete the e-check in and you will get the link to join the meeting.  There is no charge to attend this group and space is limited.      2023 and 2024 Meeting Topics and Dates:    November 3: Introduction to Mindfulness (Learn simple and effective mindfulness practices and how it can benefit you)    December 8: Let's Talk (guided discussion on our wins and challenges)    January 5: New Years Vision: Manifest your Best 2024! (Guided imagery,  journaling and discussion)    February 2: Let's Talk    March 1: 10 Percent Happier by Damion Monroy (Book Bites; a guided discussion on the nuggets of wisdom from favorite wellness books; no need to read the book but highly encouraged)    April 5: Let's Talk    May 3: \"Essentialism; The Disciplined Pursuit of Less by Tyler Salazar (book bites discussion)    June 7: Let's Talk    July 5: NO MEETING, off for the 4th of July Holiday    August 2: The Blue Zones, Secrets for Living a Longer Life by Damion Partida (book bites discussion)      If you would like bariatric surgery specific support group info please let your care team know.         Thank you,   [facility] " [place] [facility]        Zepbound (Tirzepatide)    Zepbound (Tirzepatide): is an injectable prescription medicine recently FDA approved for adults with obesity or overweight with an additional condition affected by their weight (type 2 diabetes, high blood pressure, high cholesterol, obstructive sleep apnea, etc). Zepbound contains the same active ingredient as what is in Mounjaro, an injectable FDA approved for Type 2 Diabtes. Zepbound is intended to be used along with dietary/behavioral changes and consistent exercise to improve assist with weight loss and other health improvement outcomes.     It is given as a shot once a week. It activates the body's receptors for GIP (glucose-dependent insulinotropic polypeptide) and GLP-1 (glucagon-like peptide-1) receptor, two naturally occurring hormones that help tell the brain that you are full. It also works is by slowing down how quickly food leaves your stomach. What this means for you: You will start to feel yung more quickly, notice portion changes and eat less often between meals. It is still important to maintain consistent eating schedule with meals +/- snacks and get in good hydration.      Dosing:  Initial dose: 2.5 mg injected subcutaneously once weekly for 4 weeks  Further dose changes can include: increase to 5 mg once weekly for 4 weeks, then 7.5 mg once weekly for 4 weeks, then 10 mg once weekly for 4 weeks then 12.5 mg once weekly for 4 weeks, then 15 mg (maximum dose) once weekly.    Dose changes are based on how you are tolerating the current dose, what benefits you are seeing at the current dose and if improvement in effectiveness of the drug is needed. The goal is to find the dose that works best for you with the least amount of side effects. You may find you reach this at a lower dose than the maximum dose.     Side effects: Patients are advised to eat more slowly and purposefully. Give yourself less portions. You may find  after starting this medication you have a new point of fullness. It is also important to stay adequately hydrated on the medication to reduce risks of some of these side effects. Many of these side effects (nausea, diarrhea, etc) occurred during dose escalation but did improve over time with continuing the medication. If side effects are unmanageable, reach out to your prescribing provider.     The risk of pancreatitis (inflammation of the pancreas) has been associated with this type of medication, but is very rare.  If you have had pancreatitis in the past, this medication may not be for you. Please let us know about any past history of pancreas problems. If you experience persistent severe abdominal pain (sometimes radiating to the back potentially accompanied by vomiting and have a fever), stop the medication and contact your provider immediately for assessment. They will do a blood test to check for pancreatitis.       How to Inject:   https://www.zepbound.Sonico.com/how-to-use    Storage:   Store your pen in the refrigerator. You may store your pen at room temperature for up to 21 days. If you store the pen at room temperature, do not return the pen to the refrigerator. Discard the pen if not used within 21 days after removing from the refrigerator.      For any questions or concerns please send a [employer] message to our team or call our weight management call center at [phone] during regular business hours.

## 2024-01-10 NOTE — ASSESSMENT & PLAN NOTE
Overweight onset in childhood. Weight gain through multiple miscarriages and 1 pregnancy. Was able to lose 20lbs post partum. Weight gain has been gradual since. Hysterectomy 9 years ago that also influenced weight gain. Has been weight stable around 190lbs. Previous weight loss attempts through diet programs with minimal sustainable results. Weight gain most recently has been influenced by stress, limited food options with work schedule, and genetics. Wants to lose weight to improve overall health and joint pain.     Currently working 65hour days with minimal time for eating and breaks. This limits food options, and only things that can be made within minutes in the microwave. She will discuss further options with dietitian to see if able to increase protein and vegetables.     Discussed AOMs to help with weight loss:   - Phentermine contraindicated due to glaucoma   - Topiramate contraindicated due to glaucoma and kidney stones   - Naltrexone to be started if GLP-1 not covered. No contraindications. Discussed side effects, risks, and benefits. No hx of liver disease. Not taking opioids  - Metformin can be considered in the future. No hx of kidney disease.    - GLP-1 to be started today. No contraindications. Discussed side effects, risks, and benefits. No hx pancreatitis. No personal or family hx of MTC or MENII. Will start Zepbound. Can consider Wegovy and Saxenda as needed. Aware of Wegovy shortages. No hx of pre or type II diabetes.

## 2024-01-10 NOTE — PATIENT INSTRUCTIONS
"Hi Shae,     It was nice to meet you today!    Follow-up with RD on February 21.     Thank you,    Jo Ann King, LNAIE, LD  If you would like to schedule or reschedule an appointment with the RD, please call [phone]    Nutrition Goals  1) Implement the plate method as a guide for balanced meals.  2) Have a good source of protein at all meals and aim for at least 60 grams per day. Check out protein resources below.   3) Try to meal plan on the weekend for either breakfast or lunch meals.     The Plate Method:  Plate method can be used for general guidance on balanced meals/portion sizes (see link below for picture/more information)                 Make 1/2 your plate non starchy vegetables (cauliflower, broccoli, asparagus, [place] sprouts, lettuce, carrots, for example).                3+ oz lean protein sources (salmon/skinless chicken/turkey breast/pork loin/lean cuts of beef/ or non-animal protein such as black, kidney or davila beans, tofu/edamame/tempeh)    (Note: 3 oz = deck of cards size)                1/2 to 1 cup carbohydrate choices such as whole grain starches or starchy vegetables or fruit. For example: quinoa, brown rice, barley, potatoes, sweet potatoes, winter squash, peas, corn, or fruit.                Choose ~0-2 added fat servings at a meal (avocados, nut butters, nuts or seeds, olive oil, vegetable oils).    [email or web address]    Protein Sources   http://My Luv My Life My Heartbeats/746450.pdf     Quick/Easy Protein Sources:  Hard boiled eggs  Part-skim cheese sticks  Baby Bell cheese rounds  Low-fat/low-sugar Greek yogurt  Low-fat cottage cheese  Lean deli meat (chicken/turkey/ham)  Roasted chickpeas or lentils  Nuts   Turkey meat stick  Protein shake/bar  \"P3\" snack (cheese, nuts, deli meat)  Aldi's \"Protein Bread\"   \"Egglife\" egg white wrap    Tuna/salmon can/packet     Carbohydrates  [email or web address]://[email or web address]     Mindful Eating  http://My Luv My Life My Heartbeats/501415.pdf     Summary of Volumetrics " Eating Plan  http://Goomzee/307420.pdf     Mini egg muffins   https://www.Next Glass.Collax/recipe/432603/scrambled-egg-muffins/    Sheet Pan Dinner Recipes  https://SocialMedia305/?s=sheet+pan+dinners  https://www.EmergentDetection/food-cooking/meals-menus/h61112212/sheet-pan-dinner-recipes/     Marinades for chicken, steak and fish  https://FilaExpress/10-healthy-chicken-marinades-whole30/  https://www.Marqeta/best-steak-marinades/  https://Soevolved/seafood/recipes/marinades     Recipe Ideas  -https://eSpark/category/3-ofkwejqhbu-yfusnkl/  -https://www.Eli Nutrition/recipes/pesto-pasta-with-chicken-and-tomatoes/0509148t-22y0-42a4-48q0-6408d83wl66u   -https://www.CitizenShipper/recipes/photos/things-to-make-with-pesto  -https://MiTio.VendAsta/collection/sheet-pan-supper-recipes/   -https://www.myplate.gov/recipes/supplemental-nutrition-assistance-program-snap/fruit-kabobs-yogurt-dip   -https://Midatech/rice-recipes/     Frozen Meal Ideas:   Lean Cuisine   Green Giant [place] Protein Bowls   Frontera Chicken & Chorizo Taco Bowl  Healthy Choice Simply Steamers  Garden Lites Veggies Made Great  Healthy Choice Power Bowls  Lean Cuisine  Smart Ones  Justice Casillas    COMPREHENSIVE WEIGHT MANAGEMENT PROGRAM  VIRTUAL SUPPORT GROUPS    At [facility], our Comprehensive Weight Management program offers on-line support groups for patients who are working on weight loss and considering, preparing for, or have had weight loss surgery.     There is no cost for this opportunity.  You are invited to attend the?Virtual Support Groups?provided by any of the following locations:    [facility] via Motwin Teams with Marilyn Sanchez RN  2.   [place] via Motwin Teams with Ruben Pulido, PhD, LP  3.   [place] via Motwin Teams with Miranda Llanos RN  4.   [facility] via a Zoom Meeting with FADY Ramirez    The following  "Support Group information can also be found on our website:  https://www.[facility].org/treatments/weight-loss-and-weight-loss-surgery-support-groups      [facility] Weight Loss Surgery Support Group  The support group is a patient-lead forum that meets monthly to share experiences, encouragement and education. It is open to those who have had weight loss surgery, are scheduled for surgery, or are considering surgery.   WHEN: This group meets on the 3rd Wednesday of each month from 5:00PM - 6:00PM virtually using Microsoft Teams.   FACILITATOR: Led by Marilyn Mai, LANIE, LD, RN, the program's Clinical Coordinator.   TO REGISTER: Please contact the clinic via My Health Direct or call the nurse line directly at [phone] to inform our staff that you would like an invite sent to you and to let us know the email you would like the invite sent to. Prior to the meeting, a link with directions on how to join the meeting will be sent to you.    2024 Meetings   January 17  February 21  March 20  April 17  May 15  Debora 19      [facility] Bariatric Care Support Group?  This is open to all pre- and post- operative bariatric surgery patients as well as their support system.   WHEN: This group meets the 3rd Tuesday of each month from 6:30 PM - 8:00 PM virtually using Microsoft Teams.   FACILITATOR: Led by Ruben Pulido, Ph.D who is a Licensed Psychologist with the [facility] Comprehensive Weight Management Program.   TO REGISTER: Please send an email to Ruben Pulido, Ph.D., LP at?[email or web address]?if you would like an invitation to the group. Prior to the meeting, a link with directions on how to join the meeting will be sent to you.    2024 Meetings January 16: \"Medication Management and Bariatric Surgery\", Cinthya Weber, PharmD, Pharmacy Resident at [facility]  February 20: \"A Bariatric Surgery Patient's Perspective\", " "BRITNI Brewer, [facility], Behavioral Health Clinician at [facility]  March 19  April 16  May 21  Debora 18: \"Nutritional Labeling\", Dietitian speaker to be announced.  November 19: \"Holiday Eating\", Dietitian speaker to be announced.    [facility] Post-Operative Bariatric Surgery Support Group  This is a support group for [facility] bariatric patients (and those external to [facility]) who have had bariatric surgery and are at least 3 months post-surgery.  WHEN: This support group meets the 4th Wednesday of the month from 11:00 AM - 12:00 PM virtually using Microsoft Teams.   FACILITATOR: Led by Certified Bariatric Nurse, Miranda Llanos RN.   TO REGISTER: Please send an email to Miranda at [email or web address] if you would like an invitation to the group.  Prior to the meeting, a link with directions on how to join the meeting will be sent to you.    2024 Meetings  January 24  February 28  March 27  April 24  May 22  Debora 26    [facility] Healthy Lifestyle Group?  This is a 60 minute virtual coaching group for those who want to lead a healthier lifestyle. Come together to set goals and overcome barriers in a supportive group environment. We will address the four pillars of health: nutrition, exercise, sleep and emotional well-being.  This group is highly recommended for those who are participating in the 24 week Healthy Lifestyle Plan and our Health Coaching sessions.  WHEN: This group meets the 1st Friday of the month, 12:30 PM - 1:30 PM online, via a zoom meeting.    FACILITATOR: Led by National Board Certified Health and , Miranda Strauss [facility].   TO REGISTER: Please call the Call Center at [phone] to register. You will get an appointment to attend in [place]. Fifteen minutes prior to the meeting, complete the e-check in and you will get the link to join " "the meeting.    There is no charge to attend this group and space is limited.     2024 Meetings  January 5: \"New Years Vision: Manifest your Best 2024!\" (guided imagery,  journaling and discussion)  February 2: \"Let's Talk\"  March 1: \"10 Percent Happier\" by Damion Monroy (Book Bites - a guided discussion on the nuggets of wisdom from favorite wellness books, no need to read the book but highly encouraged)  April 5: \"Let's Talk\"  May 3: \"Essentialism: The Disciplined Pursuit of Less\" by Tyler Landin (Book Bites discussion)  June 7: \"Let's Talk\"  July 5: NO MEETING, off for the 4th of July Holiday  August 2: \"The Blue Zones, Secrets for Living a Longer Life\" by Damion Partida (Book Bites discussion)        "

## 2024-01-10 NOTE — NURSING NOTE
Is the patient currently in the state of [place]? YES    Visit mode:VIDEO    If the visit is dropped, the patient can be reconnected by: VIDEO VISIT: Send to e-mail at: [email or web address]    Will anyone else be joining the visit? NO  (If patient encounters technical issues they should call [phone]    How would you like to obtain your AVS? MyChart    Are changes needed to the allergy or medication list? No    Reason for visit: Consult    Hemant TREVINO

## 2024-01-10 NOTE — TELEPHONE ENCOUNTER
Went to submit a PA for the medication Zepbound, insurance came back with an instant rejection. There are no options to appeal this. Patient will have to pay cash if they want this medication.

## 2024-01-10 NOTE — LETTER
"1/10/2024       RE: Shae Pierre  [address] [place] [address]     Dear Colleague,    Thank you for referring your patient, Shae Pierre, to the [facility]. Please see a copy of my visit note below.    Video-Visit Details    Type of service:  Video Visit    Video Start Time: 8:58 AM   Video End Time: 9:24 AM     Originating Location (pt. Location): Home    Distant Location (provider location): Offsite (providers home) [facility]     Platform used for Video Visit: MOLI    New Weight Management Nutrition Consultation    Shae Pierre is a 58 year old female presents today for new weight management nutrition consultation.  Patient referred by Lianet Davey on January 10, 2024.    Patient with Co-morbidities of obesity includin/9/2024     7:57 AM   --   I have the following health issues associated with obesity High Blood Pressure    Infertility   I have the following symptoms associated with obesity Knee Pain    Infertility (Difficulty Getting Pregnant)    Lower Extremity Swelling    Fatigue    Groin Rash    Hip Pain     Anthropometrics:  Initial consult weight 193 lb on 1/10/24.   Estimated body mass index is 36.47 kg/m  as calculated from the following:    Height as of an earlier encounter on 1/10/24: 1.549 m (5' 1\").    Weight as of an earlier encounter on 1/10/24: 87.5 kg (193 lb).    Medications for Weight Loss:  Start Zepbound if approved by insurance.   Consider Wegovy and Saxenda as needed.   If not covered will start Naltrexone     Occupation: Full-time , works around 60 hrs a week.     NUTRITION HISTORY  Food allergies: NKFA  Food intolerances: None   Vitamin/Mineral Supplements: Fish Oil, Glucosamine, Centrum MVI, Probiotic   Previous methods of diet modification for weight loss: watching portions/calories, weight " watchers, Margie Ritchie, exercise   RD before: None     Diet recall:   Days varies   Couple of times a week has breakfast - coffee, toast, eggs, breakfast sandwiches, cope   Lunch - something quick, microwave pot pie, salad, soup  Dinner - fast quick food, pizza, similar to lunch items.   Snack - handful of almonds, apple   Hydration: coffee, can of regular orange or root beer soda, diet coke, water (128 oz)          1/9/2024     7:57 AM   Diet Recall Review with Patient   If you do eat lunch, what types of food do you typically eat? sandwich, soup- something quick   If you do eat supper, what types of food do you typically eat? frozen pizza- something quick   If you do snack, what types of food do you typically eat? Almonds   How many glasses of juice do you drink in a typical day? 8   How many of glasses of milk do you drink in a typical day? 0   How many 8oz glasses of sugar containing drinks such as Solitario-Aid/sweet tea do you drink in a day? 0   How many cans/bottles of sugar pop/soda/tea/sports drinks do you drink in a day? 0   How many cans/bottles of diet pop/soda/tea or sports drink do you drink in a day? 3   How often do you have a drink of alcohol? 2-3 TImes a Week   If you do drink, how many drinks might you have in a day? 1 or 2         1/9/2024     7:57 AM   Eating Habits   Generally, my meals include foods like these bread, pasta, rice, potatoes, corn, crackers, sweet dessert, pop, or juice Almost Everyday   Generally, my meals include foods like these fried meats, brats, burgers, french fries, pizza, cheese, chips, or ice cream A Few Times a Week   Eat fast food (like McDonalds, Burger Anton, Taco Bell) Less Than Weekly   Eat at a buffet or sit-down restaurant Less Than Weekly   Eat most of my meals in front of the TV or computer A Few Times a Week   Often skip meals, eat at random times, have no regular eating times A Few Times a Week   Rarely sit down for a meal but snack or graze throughout Less Than  Weekly   Eat extra snacks between meals Less Than Weekly   Eat most of my food at the end of the day Less Than Weekly   Eat in the middle of the night or wake up at night to eat Never   Eat extra snacks to prevent or correct low blood sugar Less Than Weekly   Eat to prevent acid reflux or stomach pain Less Than Weekly   Worry about not having enough food to eat Never   I eat when I am depressed Less Than Weekly   I eat when I am stressed Less Than Weekly   I eat when I am bored Less Than Weekly   I eat when I am anxious Less Than Weekly   I eat when I am happy or as a reward Less Than Weekly   I feel hungry all the time even if I just have eaten Less Than Weekly   Feeling full is important to me Less Than Weekly   I finish all the food on my plate even if I am already full Less Than Weekly   I can't resist eating delicious food or walk past the good food/smell Less Than Weekly   I eat/snack without noticing that I am eating Less Than Weekly   I eat when I am preparing the meal Less Than Weekly   I eat more than usual when I see others eating Less Than Weekly   I have trouble not eating sweets, ice cream, cookies, or chips if they are around the house Less Than Weekly   I think about food all day Less Than Weekly   What foods, if any, do you crave? Cheese         1/9/2024     7:57 AM   Amount of Food   I feel out of control when eating Never   I eat a large amount of food, like a loaf of bread, a box of cookies, a pint/quart of ice cream, all at once Never   I eat a large amount of food even when I am not hungry Never   I eat rapidly Never   I eat alone because I feel embarrassed and do not want others to see how much I have eaten Never   I eat until I am uncomfortably full Never   I feel bad, disgusted, or guilty after I overeat Never     Physical Activity:  Did not discuss         1/9/2024     7:57 AM   Activity/Exercise History   How much of a typical 12 hour day do you spend sitting? Most of the Day   How much of  a typical 12 hour day do you spend lying down? Less Than Half the Day   How much of a typical day do you spend walking/standing? Less Than Half the Day   How many hours (not including work) do you spend on the TV/Video Games/Computer/Tablet/Phone? 1 Hour or Less   How many times a week are you active for the purpose of exercise? 2-3 Times a Week   What keeps you from being more active? Pain    Lack of Time    Too tired   How many total minutes do you spend doing some activity for the purpose of exercising when you exercise? More Than 30 Minutes     Nutrition Prescription  Recommended energy/nutrient modification.    Nutrition Diagnosis  Obesity r/t long history of positive energy balance aeb BMI >30.    Nutrition Intervention  Materials/education provided on hypocaloric diet for weight loss. Discussed volumetric eating to help satiety level on fewer calories; portion control and healthy food choices (Plate Method and Volumetrics handouts), sample meal ideas. Patient demonstrates understanding. Co-developed goals to work towards. Provided pt with list of goals and resources below via High Fidelity.     Expected Engagement: good  Follow-Up Plans: adherence to diet recommendations.      Nutrition Goals  1) Implement the plate method as a guide for balanced meals.  2) Have a good source of protein at all meals and aim for at least 60 grams per day. Check out protein resources below.   3) Try to meal plan on the weekend for either breakfast or lunch meals.     The Plate Method:  Plate method can be used for general guidance on balanced meals/portion sizes (see link below for picture/more information)                 Make 1/2 your plate non starchy vegetables (cauliflower, broccoli, asparagus, [place] sprouts, lettuce, carrots, for example).                3+ oz lean protein sources (salmon/skinless chicken/turkey breast/pork loin/lean cuts of beef/ or non-animal protein such as black, kidney or davila beans,  "tofu/edamame/tempeh)    (Note: 3 oz = deck of cards size)                1/2 to 1 cup carbohydrate choices such as whole grain starches or starchy vegetables or fruit. For example: quinoa, brown rice, barley, potatoes, sweet potatoes, winter squash, peas, corn, or fruit.                Choose ~0-2 added fat servings at a meal (avocados, nut butters, nuts or seeds, olive oil, vegetable oils).    [email or web address]    Protein Sources   [email or web address]://[email or web address]/[email or web address]     Quick/Easy Protein Sources:  Hard boiled eggs  Part-skim cheese sticks  Baby Bell cheese rounds  Low-fat/low-sugar Greek yogurt  Low-fat cottage cheese  Lean deli meat (chicken/turkey/ham)  Roasted chickpeas or lentils  Nuts   Turkey meat stick  Protein shake/bar  \"P3\" snack (cheese, nuts, deli meat)  Aldi's \"Protein Bread\"   \"Egglife\" egg white wrap    Tuna/salmon can/packet     Carbohydrates  [email or web address]     Mindful Eating  http://TipTap/[email or web address]     Summary of Volumetrics Eating Plan  http://[email or web address]     Mini egg muffins   https://www.Meridea Financial Software.Therabiol/recipe/926125/scrambled-egg-muffins/    Sheet Pan Dinner Recipes  https://Peak Positioning Technologies/?s=sheet+pan+dinners  https://www.Cardiome Pharma/food-cooking/meals-menus/t73504434/sheet-pan-dinner-recipes/     Marinades for chicken, steak and fish  https://Red Mountain Medical Response/10-healthy-chicken-marinades-whole30/  https://www.Shadow Networks.Therabiol/best-steak-marinades/  https://ReFashioner.com/seafood/recipes/marinades     Recipe Ideas  -https://www.Lenddo/category/9-zjprkkqrcw-ilyrwyc/  -https://www.ADENTS HTI/recipes/pesto-pasta-with-chicken-and-tomatoes/8734967j-33l9-28j4-57s1-5237l61gi19p   -https://www.Solar Flow-Through.Therabiol/recipes/photos/things-to-make-with-pesto  -https://www.Camerborn.Therabiol/collection/sheet-pan-supper-recipes/   -https://www.myplate.gov/recipes/supplemental-nutrition-assistance-program-snap/fruit-kabobs-yogurt-dip "   -https://Neusoft Group/rice-recipes/     Frozen Meal Ideas:   Lean Cuisine   Green Giant [place] Protein Bowls   Frontera Chicken & Chorizo Taco Bowl  Healthy Choice Simply Steamers  Garden Lites Veggies Made Great  Healthy Choice Power Bowls  Lean Cuisine  Smart Ones  Justice Casillas    Follow-Up: February 21.     Time spent with patient: 26 minutes.  Jo Ann King RD, LD

## 2024-01-10 NOTE — PROGRESS NOTES
"Virtual Visit Details    Type of service:  Video Visit     Originating Location (pt. Location): {video visit patient location:063419::\"Home\"}  {PROVIDER LOCATION On-site should be selected for visits conducted from your clinic location or adjoining [facility] hospital, academic office, or other nearby [facility] building. Off-site should be selected for all other provider locations, including home:[id number]}  Distant Location (provider location):  {virtual location provider:796027}  Platform used for Video Visit: {Virtual Visit Platforms:521998::\"EventBug\"}    "

## 2024-01-10 NOTE — PROGRESS NOTES
"Virtual Visit Details    Type of service:  Video Visit     Originating Location (pt. Location): Home    Distant Location (provider location):  Off-site  Platform used for Video Visit: AmWell      81 minutes spent by me on the date of the encounter doing chart review, history and exam, documentation and further activities per the note    New Medical Weight Management Consult    PATIENT:  Shae Pierre  MRN:         [id number]  :         1965  VIKY:         1/10/2024        I had the pleasure of seeing your patient, Shae Pierre. Full intake/assessment was done to determine barriers to weight loss success and develop a treatment plan. Shae Pierre is a 58 year old female interested in treatment of medical problems associated with excess weight. She has a height of 5' 1\", a weight of 193 lbs 0 oz, and the calculated Body mass index is 36.47 kg/m .        Assessment & Plan   Problem List Items Addressed This Visit       Class 2 severe obesity with serious comorbidity and body mass index (BMI) of 36.0 to 36.9 in adult, unspecified obesity type (H) - Primary     Overweight onset in childhood. Weight gain through multiple miscarriages and 1 pregnancy. Was able to lose 20lbs post partum. Weight gain has been gradual since. Hysterectomy 9 years ago that also influenced weight gain. Has been weight stable around 190lbs. Previous weight loss attempts through diet programs with minimal sustainable results. Weight gain most recently has been influenced by stress, limited food options with work schedule, and genetics. Wants to lose weight to improve overall health and joint pain.     Currently working 65hour days with minimal time for eating and breaks. This limits food options, and only things that can be made within minutes in the microwave. She will discuss further options with dietitian to see if able to increase protein and vegetables.     Discussed AOMs to help with weight loss:   - Phentermine contraindicated " due to glaucoma   - Topiramate contraindicated due to glaucoma and kidney stones   - Naltrexone to be started if GLP-1 not covered. No contraindications. Discussed side effects, risks, and benefits. No hx of liver disease. Not taking opioids  - Metformin can be considered in the future. No hx of kidney disease.    - GLP-1 to be started today. No contraindications. Discussed side effects, risks, and benefits. No hx pancreatitis. No personal or family hx of MTC or MENII. Will start Zepbound. Can consider Wegovy and Saxenda as needed. Aware of Wegovy shortages. No hx of pre or type II diabetes.          Relevant Medications    tirzepatide-Weight Management (ZEPBOUND) 2.5 MG/0.5ML prefilled pen    tirzepatide-Weight Management (ZEPBOUND) 5 MG/0.5ML prefilled pen (Start on 2/9/2024)    Other Relevant Orders    CBC with platelets    Comprehensive metabolic panel    Hemoglobin A1c    Lipid panel reflex to direct LDL Fasting    TSH with free T4 reflex    Med Therapy Management Referral        Start Zepbound 2.5mg once weekly for 4 weeks, then increase to 5.0mg once weekly. Consider Wegovy and Saxenda as needed. If not covered will start Naltrexone.   Labs ordered  Follow up with [facility] pharmacist in 6 weeks   Follow up with Lianet Lara in 3 months   Dietitian as needed         Shae Pierre is a 58 year old female who presents to clinic today for the following health issues.     She has the following co-morbidities:        1/9/2024     7:57 AM   --   I have the following health issues associated with obesity High Blood Pressure    Infertility   I have the following symptoms associated with obesity Knee Pain    Infertility (Difficulty Getting Pregnant)    Lower Extremity Swelling    Fatigue    Groin Rash    Hip Pain     Migraine - currently well controled     Left knee pain - has had 3 surgeries. Does need a total knee replacement at some point    HTN - just monitoring. Not on medications. Followed by PCP           1/9/2024      "7:57 AM   Patient Goals   If yes, please indicate which surgery? left knee           1/9/2024     7:57 AM   Referring Provider   Please name the provider who referred you to Medical Weight Management  If you do not know, please answer \"I Don't Know\" I dont know           1/9/2024     7:57 AM   Weight History   How concerned are you about your weight? Very Concerned   I became overweight As a Child   The following factors have contributed to my weight gain A Health Crisis    Lack of Exercise    Genetic (Runs in the Family)   I have tried the following methods to lose weight Watching Portions or Calories    Exercise    Weight Watchers    Other   Please list the other methods [facility]ada program   My lowest weight since age 20 was 108   My highest weight since age 20 was 205   The most weight I have ever lost was (lbs) 15   I have the following family history of obesity/being overweight One or more of my siblings are overweight    Many of my relatives are overweight     Overweight onset in childhood. Felt normal weight through high school. Had multiple miscarriages, and with each one felt like she gained through each one. With her daughter gained around 30lbs, and was put on bed rest. Was able to lose 20lbs post partum. Previously was weight stable around 140lbs. Since pregnancies, weight gain has been gradual. Total hysterectomy 9 years ago, which also lead to weight gain. Has been weight stable around 190lbs for the past couple of years. Tried Weight watchers and Margie eric previously with minimal weight loss and was not sustainable. Did the omada program through [employer] this past year and lost 15lbs, but regained weight even with program continuation. Daughter had some weight concerns and saw a dietitian with success. All 4 brothers have weight concerns, but sisters do not. Wants to lose weight to increase energy, to feel better overall all, to improve overall health, and improve joint pain.     Eats 2-3 meals a day, with " 1-2 snacks at times. Will skip breakfast at times. Lunch and Dinner usually something quick to prepare. Everything has to be preplanned for work, otherwise does not have time. Increase hunger. No increase thoughts of food. Craves salty foods. Can get full, but has a harder time staying full. No emotional or boredom eating. If upset, typically does not eat. Eats out 2xmonth. Drinks water, coffee, regular or diet pop (1can daily).   Lunch and Dinner - microwave meals - potpie, pizza rolls, frozen pizza, soup, salad    Activity - Walks 5xweek, has a treadmill for the winter. Will walk for 50min. However, has been having some knee pain, that can limit - has had 2 surgeries on it.     Has not tried AOMs in the past.           1/9/2024     7:57 AM   Diet Recall Review with Patient   If you do eat lunch, what types of food do you typically eat? sandwich, soup- something quick   If you do eat supper, what types of food do you typically eat? frozen pizza- something quick   If you do snack, what types of food do you typically eat? Almonds   How many glasses of juice do you drink in a typical day? 8   How many of glasses of milk do you drink in a typical day? 0   How many 8oz glasses of sugar containing drinks such as Solitario-Aid/sweet tea do you drink in a day? 0   How many cans/bottles of sugar pop/soda/tea/sports drinks do you drink in a day? 0   How many cans/bottles of diet pop/soda/tea or sports drink do you drink in a day? 3   How often do you have a drink of alcohol? 2-3 TImes a Week   If you do drink, how many drinks might you have in a day? 1 or 2           1/9/2024     7:57 AM   Eating Habits   Generally, my meals include foods like these bread, pasta, rice, potatoes, corn, crackers, sweet dessert, pop, or juice Almost Everyday   Generally, my meals include foods like these fried meats, brats, burgers, french fries, pizza, cheese, chips, or ice cream A Few Times a Week   Eat fast food (like [employer], Protagen, [employer]) Less Than Weekly   Eat at a buffet or sit-down restaurant Less Than Weekly   Eat most of my meals in front of the TV or computer A Few Times a Week   Often skip meals, eat at random times, have no regular eating times A Few Times a Week   Rarely sit down for a meal but snack or graze throughout Less Than Weekly   Eat extra snacks between meals Less Than Weekly   Eat most of my food at the end of the day Less Than Weekly   Eat in the middle of the night or wake up at night to eat Never   Eat extra snacks to prevent or correct low blood sugar Less Than Weekly   Eat to prevent acid reflux or stomach pain Less Than Weekly   Worry about not having enough food to eat Never   I eat when I am depressed Less Than Weekly   I eat when I am stressed Less Than Weekly   I eat when I am bored Less Than Weekly   I eat when I am anxious Less Than Weekly   I eat when I am happy or as a reward Less Than Weekly   I feel hungry all the time even if I just have eaten Less Than Weekly   Feeling full is important to me Less Than Weekly   I finish all the food on my plate even if I am already full Less Than Weekly   I can't resist eating delicious food or walk past the good food/smell Less Than Weekly   I eat/snack without noticing that I am eating Less Than Weekly   I eat when I am preparing the meal Less Than Weekly   I eat more than usual when I see others eating Less Than Weekly   I have trouble not eating sweets, ice cream, cookies, or chips if they are around the house Less Than Weekly   I think about food all day Less Than Weekly   What foods, if any, do you crave? Cheese           1/9/2024     7:57 AM   Amount of Food   I feel out of control when eating Never   I eat a large amount of food, like a loaf of bread, a box of cookies, a pint/quart of ice cream, all at once Never   I eat a large amount of food even when I am not hungry Never   I eat rapidly Never   I eat alone because I feel embarrassed and do not want others to see  how much I have eaten Never   I eat until I am uncomfortably full Never   I feel bad, disgusted, or guilty after I overeat Never           1/9/2024     7:57 AM   Activity/Exercise History   How much of a typical 12 hour day do you spend sitting? Most of the Day   How much of a typical 12 hour day do you spend lying down? Less Than Half the Day   How much of a typical day do you spend walking/standing? Less Than Half the Day   How many hours (not including work) do you spend on the TV/Video Games/Computer/Tablet/Phone? 1 Hour or Less   How many times a week are you active for the purpose of exercise? 2-3 Times a Week   What keeps you from being more active? Pain    Lack of Time    Too tired   How many total minutes do you spend doing some activity for the purpose of exercising when you exercise? More Than 30 Minutes       PAST MEDICAL HISTORY:  Past Medical History:   Diagnosis Date    Arthritis 2000    Dysplasia of cervix (uteri) 1998    Fibroids 02/22/2012    Other abnormal Pap smear and cervical HPV (human papapillomavirus) 09/29/2008    EM>40 and pos HPV unknown type rec rpt pap 6 mos/11/08  1/26/2009 F/u pap was normal  Will have next pap in 1 year.     Other acne     PONV (postoperative nausea and vomiting)            1/9/2024     7:57 AM   Work/Social History Reviewed With Patient   My employment status is Full-Time   My job is    How much of your job is spent on the computer or phone? 100%   How many hours do you spend commuting to work daily? 10   What is your marital status? Single   If you have children, are they overweight? Yes   Who do you live with? Self   Who does the food shopping? self     Works full time as  for [employer]. Works around 65 hours a week. Has been in this job since April. Was  before, and worked similar hours. Has 1 daughter. Good support system.     ETOH - 1-2xweek and will have 1-2 drinks at a time   No nicotine,  drugs or THC.         1/9/2024     7:57 AM   Mental Health History Reviewed With Patient   Have you ever been physically or sexually abused? No   How often in the past 2 weeks have you felt little interest or pleasure in doing things? Not at all   Over the past 2 weeks how often have you felt down, depressed, or hopeless? Not at all           1/9/2024     7:57 AM   Sleep History Reviewed With Patient   How many hours do you sleep at night? 7   Has had insomnia most of life. Has hard time going to sleep and staying asleep. Has had this since she was in childhood. Had a sleep study, but was not completed due to no information was taken.     MEDICATIONS:   Current Outpatient Medications   Medication Sig Dispense Refill    ADVIL 200 MG OR TABS TAKE 1 TO 2 TABLETS EVERY 4 TO 6 HOURS AS NEEDED WITH FOOD 120 0    augmented betamethasone dipropionate (DIPROLENE AF) 0.05 % external cream Apply sparingly to affected area twice daily as needed.  Do not apply to face. 100 g 3    clotrimazole (LOTRIMIN) 1 % external cream Apply topically 2 times daily 113 g 1    GLUCOSAMINE SULFATE PO Take 500 mg by mouth daily      latanoprost (XALATAN) 0.005 % ophthalmic solution Place 1 drop into each eye EVERY EVENING      MULTI-VITAMIN OR TABS 1 TABLET DAILY 30 0    nystatin (MYCOSTATIN) 581312 UNIT/GM external powder Apply topically 2 times daily 60 g 1    Omega-3 Fatty Acids (FISH OIL) 435 MG CAPS       SUMAtriptan (IMITREX) 100 MG tablet Take 1 tablet (100 mg) by mouth at onset of headache for migraine May repeat in 2 hours prn: max 2/day 12 tablet 4    tirzepatide-Weight Management (ZEPBOUND) 2.5 MG/0.5ML prefilled pen Inject 0.5 mLs (2.5 mg) Subcutaneous every 7 days For 4 weeks 2 mL 0    [START ON 2/9/2024] tirzepatide-Weight Management (ZEPBOUND) 5 MG/0.5ML prefilled pen Inject 0.5 mLs (5 mg) Subcutaneous every 7 days After completing 4 weeks of the 2.5mg dose 2 mL 2       ALLERGIES:   Allergies   Allergen Reactions    Morphine Other  "(See Comments) and Difficulty breathing     Throat closes    Morphine And Related Other (See Comments) and Difficulty breathing     Throat closes    Sulfa Antibiotics Hives    Staples Other (See Comments) and Rash     Rash and infection after knee surgery     Objective    Ht 1.549 m (5' 1\")   Wt 87.5 kg (193 lb)   LMP 04/05/2012   BMI 36.47 kg/m    Vitals - Patient Reported  Pain Score: No Pain (0)      Vitals:  No vitals were obtained today due to virtual visit.    Physical Exam   GENERAL: Healthy, alert and no distress  EYES: Eyes grossly normal to inspection.  No discharge or erythema, or obvious scleral/conjunctival abnormalities.  RESP: No audible wheeze, cough, or visible cyanosis.  No visible retractions or increased work of breathing.    SKIN: Visible skin clear. No significant rash, abnormal pigmentation or lesions.  NEURO: Cranial nerves grossly intact.  Mentation and speech appropriate for age.  PSYCH: Mentation appears normal, affect normal/bright, judgement and insight intact, normal speech and appearance well-groomed.     Anti-obesity medication ROS:    HEENT  Hx of glaucoma: Yes    Cardiovascular  CAD:No  HTN:No    Gastrointestinal  GERD: very rarely  Constipation:Yes, takes miralax PRN  Liver Dz:No  H/O Pancreatitis:No    Psychiatric  Bipolar: No  Anxiety:No  Depression:No  History of alcohol/drug abuse: No  Hx of eating disorder:No    Endocrine  Personal or family hx of MTC or MEN2:No  Diabetes/prediabetes: No    Neurologic:  Hx of seizures: No  Hx of migraines: Yes  Memory Impairment: No      History of kidney stones: Yes, 2x, last occurrence a few years ago. Had to have surgery the 2nd time.   Kidney disease: No  Current birth control:  hysterectomy    Taking Opioid/Narcotic: No        Sincerely,    Lianet Davey PA-C  "

## 2024-01-10 NOTE — LETTER
"1/10/2024       RE: Shae Pierre  [address] [address] [place] [id number]     Dear Colleague,    Thank you for referring your patient, Shae Pierre, to the [facility]. Please see a copy of my visit note below.    Virtual Visit Details    Type of service:  Video Visit     Originating Location (pt. Location): Home    Distant Location (provider location):  Off-site  Platform used for Video Visit: [employer]      81 minutes spent by me on the date of the encounter doing chart review, history and exam, documentation and further activities per the note    New Medical Weight Management Consult    PATIENT:  Shae Pierre  MRN:         [id number]  :         1965  VIKY:         1/10/2024        I had the pleasure of seeing your patient, Shae Pierre. Full intake/assessment was done to determine barriers to weight loss success and develop a treatment plan. Shae Pierre is a 58 year old female interested in treatment of medical problems associated with excess weight. She has a height of 5' 1\", a weight of 193 lbs 0 oz, and the calculated Body mass index is 36.47 kg/m .        Assessment & Plan  Problem List Items Addressed This Visit       Class 2 severe obesity with serious comorbidity and body mass index (BMI) of 36.0 to 36.9 in adult, unspecified obesity type (H) - Primary     Overweight onset in childhood. Weight gain through multiple miscarriages and 1 pregnancy. Was able to lose 20lbs post partum. Weight gain has been gradual since. Hysterectomy 9 years ago that also influenced weight gain. Has been weight stable around 190lbs. Previous weight loss attempts through diet programs with minimal sustainable results. Weight gain most recently has been influenced by stress, limited food options with work schedule, and genetics. Wants to lose weight to improve overall health and joint pain.     Currently working " 65hour days with minimal time for eating and breaks. This limits food options, and only things that can be made within minutes in the microwave. She will discuss further options with dietitian to see if able to increase protein and vegetables.     Discussed AOMs to help with weight loss:   - Phentermine contraindicated due to glaucoma   - Topiramate contraindicated due to glaucoma and kidney stones   - Naltrexone to be started if GLP-1 not covered. No contraindications. Discussed side effects, risks, and benefits. No hx of liver disease. Not taking opioids  - Metformin can be considered in the future. No hx of kidney disease.    - GLP-1 to be started today. No contraindications. Discussed side effects, risks, and benefits. No hx pancreatitis. No personal or family hx of MTC or MENII. Will start Zepbound. Can consider Wegovy and Saxenda as needed. Aware of Wegovy shortages. No hx of pre or type II diabetes.          Relevant Medications    tirzepatide-Weight Management (ZEPBOUND) 2.5 MG/0.5ML prefilled pen    tirzepatide-Weight Management (ZEPBOUND) 5 MG/0.5ML prefilled pen (Start on 2/9/2024)    Other Relevant Orders    CBC with platelets    Comprehensive metabolic panel    Hemoglobin A1c    Lipid panel reflex to direct LDL Fasting    TSH with free T4 reflex    Med Therapy Management Referral        Start Zepbound 2.5mg once weekly for 4 weeks, then increase to 5.0mg once weekly. Consider Wegovy and Saxenda as needed. If not covered will start Naltrexone.   Labs ordered  Follow up with [facility] pharmacist in 6 weeks   Follow up with Lianet Lara in 3 months   Dietitian as needed         Shae Pierre is a 58 year old female who presents to clinic today for the following health issues.     She has the following co-morbidities:        1/9/2024     7:57 AM   --   I have the following health issues associated with obesity High Blood Pressure    Infertility   I have the following symptoms associated with obesity Knee Pain     "Infertility (Difficulty Getting Pregnant)    Lower Extremity Swelling    Fatigue    Groin Rash    Hip Pain     Migraine - currently well controled     Left knee pain - has had 3 surgeries. Does need a total knee replacement at some point    HTN - just monitoring. Not on medications. Followed by PCP           1/9/2024     7:57 AM   Patient Goals   If yes, please indicate which surgery? left knee           1/9/2024     7:57 AM   Referring Provider   Please name the provider who referred you to Medical Weight Management  If you do not know, please answer \"I Don't Know\" I dont know           1/9/2024     7:57 AM   Weight History   How concerned are you about your weight? Very Concerned   I became overweight As a Child   The following factors have contributed to my weight gain A Health Crisis    Lack of Exercise    Genetic (Runs in the Family)   I have tried the following methods to lose weight Watching Portions or Calories    Exercise    Weight Watchers    Other   Please list the other methods Omada program   My lowest weight since age 20 was 108   My highest weight since age 20 was 205   The most weight I have ever lost was (lbs) 15   I have the following family history of obesity/being overweight One or more of my siblings are overweight    Many of my relatives are overweight     Overweight onset in childhood. Felt normal weight through high school. Had multiple miscarriages, and with each one felt like she gained through each one. With her daughter gained around 30lbs, and was put on bed rest. Was able to lose 20lbs post partum. Previously was weight stable around 140lbs. Since pregnancies, weight gain has been gradual. Total hysterectomy 9 years ago, which also lead to weight gain. Has been weight stable around 190lbs for the past couple of years. Tried Weight watchers and Margie eric previously with minimal weight loss and was not sustainable. Did the omada program through [employer] this past year and lost 15lbs, but " regained weight even with program continuation. Daughter had some weight concerns and saw a dietitian with success. All 4 brothers have weight concerns, but sisters do not. Wants to lose weight to increase energy, to feel better overall all, to improve overall health, and improve joint pain.     Eats 2-3 meals a day, with 1-2 snacks at times. Will skip breakfast at times. Lunch and Dinner usually something quick to prepare. Everything has to be preplanned for work, otherwise does not have time. Increase hunger. No increase thoughts of food. Craves salty foods. Can get full, but has a harder time staying full. No emotional or boredom eating. If upset, typically does not eat. Eats out 2xmonth. Drinks water, coffee, regular or diet pop (1can daily).   Lunch and Dinner - microwave meals - potpie, pizza rolls, frozen pizza, soup, salad    Activity - Walks 5xweek, has a treadmill for the winter. Will walk for 50min. However, has been having some knee pain, that can limit - has had 2 surgeries on it.     Has not tried AOMs in the past.           1/9/2024     7:57 AM   Diet Recall Review with Patient   If you do eat lunch, what types of food do you typically eat? sandwich, soup- something quick   If you do eat supper, what types of food do you typically eat? frozen pizza- something quick   If you do snack, what types of food do you typically eat? Almonds   How many glasses of juice do you drink in a typical day? 8   How many of glasses of milk do you drink in a typical day? 0   How many 8oz glasses of sugar containing drinks such as Solitario-Aid/sweet tea do you drink in a day? 0   How many cans/bottles of sugar pop/soda/tea/sports drinks do you drink in a day? 0   How many cans/bottles of diet pop/soda/tea or sports drink do you drink in a day? 3   How often do you have a drink of alcohol? 2-3 TImes a Week   If you do drink, how many drinks might you have in a day? 1 or 2           1/9/2024     7:57 AM   Eating Habits    Generally, my meals include foods like these bread, pasta, rice, potatoes, corn, crackers, sweet dessert, pop, or juice Almost Everyday   Generally, my meals include foods like these fried meats, brats, burgers, french fries, pizza, cheese, chips, or ice cream A Few Times a Week   Eat fast food (like McDonalds, Burger [employer], Taco Bell) Less Than Weekly   Eat at a buffet or sit-down restaurant Less Than Weekly   Eat most of my meals in front of the TV or computer A Few Times a Week   Often skip meals, eat at random times, have no regular eating times A Few Times a Week   Rarely sit down for a meal but snack or graze throughout Less Than Weekly   Eat extra snacks between meals Less Than Weekly   Eat most of my food at the end of the day Less Than Weekly   Eat in the middle of the night or wake up at night to eat Never   Eat extra snacks to prevent or correct low blood sugar Less Than Weekly   Eat to prevent acid reflux or stomach pain Less Than Weekly   Worry about not having enough food to eat Never   I eat when I am depressed Less Than Weekly   I eat when I am stressed Less Than Weekly   I eat when I am bored Less Than Weekly   I eat when I am anxious Less Than Weekly   I eat when I am happy or as a reward Less Than Weekly   I feel hungry all the time even if I just have eaten Less Than Weekly   Feeling full is important to me Less Than Weekly   I finish all the food on my plate even if I am already full Less Than Weekly   I can't resist eating delicious food or walk past the good food/smell Less Than Weekly   I eat/snack without noticing that I am eating Less Than Weekly   I eat when I am preparing the meal Less Than Weekly   I eat more than usual when I see others eating Less Than Weekly   I have trouble not eating sweets, ice cream, cookies, or chips if they are around the house Less Than Weekly   I think about food all day Less Than Weekly   What foods, if any, do you crave? Cheese           1/9/2024     7:57  AM   Amount of Food   I feel out of control when eating Never   I eat a large amount of food, like a loaf of bread, a box of cookies, a pint/quart of ice cream, all at once Never   I eat a large amount of food even when I am not hungry Never   I eat rapidly Never   I eat alone because I feel embarrassed and do not want others to see how much I have eaten Never   I eat until I am uncomfortably full Never   I feel bad, disgusted, or guilty after I overeat Never           1/9/2024     7:57 AM   Activity/Exercise History   How much of a typical 12 hour day do you spend sitting? Most of the Day   How much of a typical 12 hour day do you spend lying down? Less Than Half the Day   How much of a typical day do you spend walking/standing? Less Than Half the Day   How many hours (not including work) do you spend on the TV/Video Games/Computer/Tablet/Phone? 1 Hour or Less   How many times a week are you active for the purpose of exercise? 2-3 Times a Week   What keeps you from being more active? Pain    Lack of Time    Too tired   How many total minutes do you spend doing some activity for the purpose of exercising when you exercise? More Than 30 Minutes       PAST MEDICAL HISTORY:  Past Medical History:   Diagnosis Date    Arthritis 2000    Dysplasia of cervix (uteri) 1998    Fibroids 02/22/2012    Other abnormal Pap smear and cervical HPV (human papapillomavirus) 09/29/2008    EM>40 and pos HPV unknown type rec rpt pap 6 mos/11/08  1/26/2009 F/u pap was normal  Will have next pap in 1 year.     Other acne     PONV (postoperative nausea and vomiting)            1/9/2024     7:57 AM   Work/Social History Reviewed With Patient   My employment status is Full-Time   My job is    How much of your job is spent on the computer or phone? 100%   How many hours do you spend commuting to work daily? 10   What is your marital status? Single   If you have children, are they overweight? Yes   Who do you live with? Self    Who does the food shopping? self     Works full time as  for [employer]. Works around 65 hours a week. Has been in this job since April. Was  before, and worked similar hours. Has 1 daughter. Good support system.     ETOH - 1-2xweek and will have 1-2 drinks at a time   No nicotine, drugs or THC.         1/9/2024     7:57 AM   Mental Health History Reviewed With Patient   Have you ever been physically or sexually abused? No   How often in the past 2 weeks have you felt little interest or pleasure in doing things? Not at all   Over the past 2 weeks how often have you felt down, depressed, or hopeless? Not at all           1/9/2024     7:57 AM   Sleep History Reviewed With Patient   How many hours do you sleep at night? 7   Has had insomnia most of life. Has hard time going to sleep and staying asleep. Has had this since she was in childhood. Had a sleep study, but was not completed due to no information was taken.     MEDICATIONS:   Current Outpatient Medications   Medication Sig Dispense Refill    ADVIL 200 MG OR TABS TAKE 1 TO 2 TABLETS EVERY 4 TO 6 HOURS AS NEEDED WITH FOOD 120 0    augmented betamethasone dipropionate (DIPROLENE AF) 0.05 % external cream Apply sparingly to affected area twice daily as needed.  Do not apply to face. 100 g 3    clotrimazole (LOTRIMIN) 1 % external cream Apply topically 2 times daily 113 g 1    GLUCOSAMINE SULFATE PO Take 500 mg by mouth daily      latanoprost (XALATAN) 0.005 % ophthalmic solution Place 1 drop into each eye EVERY EVENING      MULTI-VITAMIN OR TABS 1 TABLET DAILY 30 0    nystatin (MYCOSTATIN) 014716 UNIT/GM external powder Apply topically 2 times daily 60 g 1    Omega-3 Fatty Acids (FISH OIL) 435 MG CAPS       SUMAtriptan (IMITREX) 100 MG tablet Take 1 tablet (100 mg) by mouth at onset of headache for migraine May repeat in 2 hours prn: max 2/day 12 tablet 4    tirzepatide-Weight Management (ZEPBOUND) 2.5 MG/0.5ML  "prefilled pen Inject 0.5 mLs (2.5 mg) Subcutaneous every 7 days For 4 weeks 2 mL 0    [START ON 2/9/2024] tirzepatide-Weight Management (ZEPBOUND) 5 MG/0.5ML prefilled pen Inject 0.5 mLs (5 mg) Subcutaneous every 7 days After completing 4 weeks of the 2.5mg dose 2 mL 2       ALLERGIES:   Allergies   Allergen Reactions    Morphine Other (See Comments) and Difficulty breathing     Throat closes    Morphine And Related Other (See Comments) and Difficulty breathing     Throat closes    Sulfa Antibiotics Hives    Staples Other (See Comments) and Rash     Rash and infection after knee surgery     Objective   Ht 1.549 m (5' 1\")   Wt 87.5 kg (193 lb)   LMP 04/05/2012   BMI 36.47 kg/m    Vitals - Patient Reported  Pain Score: No Pain (0)      Vitals:  No vitals were obtained today due to virtual visit.    Physical Exam   GENERAL: Healthy, alert and no distress  EYES: Eyes grossly normal to inspection.  No discharge or erythema, or obvious scleral/conjunctival abnormalities.  RESP: No audible wheeze, cough, or visible cyanosis.  No visible retractions or increased work of breathing.    SKIN: Visible skin clear. No significant rash, abnormal pigmentation or lesions.  NEURO: Cranial nerves grossly intact.  Mentation and speech appropriate for age.  PSYCH: Mentation appears normal, affect normal/bright, judgement and insight intact, normal speech and appearance well-groomed.     Anti-obesity medication ROS:    HEENT  Hx of glaucoma: Yes    Cardiovascular  CAD:No  HTN:No    Gastrointestinal  GERD: very rarely  Constipation:Yes, takes miralax PRN  Liver Dz:No  H/O Pancreatitis:No    Psychiatric  Bipolar: No  Anxiety:No  Depression:No  History of alcohol/drug abuse: No  Hx of eating disorder:No    Endocrine  Personal or family hx of MTC or MEN2:No  Diabetes/prediabetes: No    Neurologic:  Hx of seizures: No  Hx of migraines: Yes  Memory Impairment: No      History of kidney stones: Yes, 2x, last occurrence a few years ago. Had " to have surgery the 2nd time.   Kidney disease: No  Current birth control:  hysterectomy    Taking Opioid/Narcotic: No        Sincerely,    Lianet Davey PA-C

## 2024-01-26 DIAGNOSIS — E66.01 CLASS 2 SEVERE OBESITY WITH SERIOUS COMORBIDITY AND BODY MASS INDEX (BMI) OF 36.0 TO 36.9 IN ADULT, UNSPECIFIED OBESITY TYPE (H): Primary | ICD-10-CM

## 2024-01-26 DIAGNOSIS — E66.812 CLASS 2 SEVERE OBESITY WITH SERIOUS COMORBIDITY AND BODY MASS INDEX (BMI) OF 36.0 TO 36.9 IN ADULT, UNSPECIFIED OBESITY TYPE (H): Primary | ICD-10-CM

## 2024-01-26 RX ORDER — NALTREXONE HYDROCHLORIDE 50 MG/1
TABLET, FILM COATED ORAL
Qty: 30 TABLET | Refills: 3 | Status: SHIPPED | OUTPATIENT
Start: 2024-01-26 | End: 2024-02-05

## 2024-01-29 ENCOUNTER — LAB (OUTPATIENT)
Dept: LAB | Facility: CLINIC | Age: 59
End: 2024-01-29
Payer: COMMERCIAL

## 2024-01-29 DIAGNOSIS — E66.812 CLASS 2 SEVERE OBESITY WITH SERIOUS COMORBIDITY AND BODY MASS INDEX (BMI) OF 36.0 TO 36.9 IN ADULT, UNSPECIFIED OBESITY TYPE (H): ICD-10-CM

## 2024-01-29 DIAGNOSIS — E66.01 CLASS 2 SEVERE OBESITY WITH SERIOUS COMORBIDITY AND BODY MASS INDEX (BMI) OF 36.0 TO 36.9 IN ADULT, UNSPECIFIED OBESITY TYPE (H): ICD-10-CM

## 2024-01-29 LAB
ALBUMIN SERPL BCG-MCNC: 4.4 G/DL (ref 3.5–5.2)
ALP SERPL-CCNC: 122 U/L (ref 40–150)
ALT SERPL W P-5'-P-CCNC: 28 U/L (ref 0–50)
ANION GAP SERPL CALCULATED.3IONS-SCNC: 14 MMOL/L (ref 7–15)
AST SERPL W P-5'-P-CCNC: 25 U/L (ref 0–45)
BILIRUB SERPL-MCNC: 0.4 MG/DL
BUN SERPL-MCNC: 11.6 MG/DL (ref 6–20)
CALCIUM SERPL-MCNC: 9.6 MG/DL (ref 8.6–10)
CHLORIDE SERPL-SCNC: 103 MMOL/L (ref 98–107)
CHOLEST SERPL-MCNC: 244 MG/DL
CREAT SERPL-MCNC: 0.63 MG/DL (ref 0.51–0.95)
DEPRECATED HCO3 PLAS-SCNC: 23 MMOL/L (ref 22–29)
EGFRCR SERPLBLD CKD-EPI 2021: >90 ML/MIN/1.73M2
ERYTHROCYTE [DISTWIDTH] IN BLOOD BY AUTOMATED COUNT: 13 % (ref 10–15)
FASTING STATUS PATIENT QL REPORTED: ABNORMAL
GLUCOSE SERPL-MCNC: 86 MG/DL (ref 70–99)
HBA1C MFR BLD: 5.8 % (ref 0–5.6)
HCT VFR BLD AUTO: 43.7 % (ref 35–47)
HDLC SERPL-MCNC: 64 MG/DL
HGB BLD-MCNC: 14.2 G/DL (ref 11.7–15.7)
LDLC SERPL CALC-MCNC: 146 MG/DL
MCH RBC QN AUTO: 31.7 PG (ref 26.5–33)
MCHC RBC AUTO-ENTMCNC: 32.5 G/DL (ref 31.5–36.5)
MCV RBC AUTO: 98 FL (ref 78–100)
NONHDLC SERPL-MCNC: 180 MG/DL
PLATELET # BLD AUTO: 314 10E3/UL (ref 150–450)
POTASSIUM SERPL-SCNC: 4.5 MMOL/L (ref 3.4–5.3)
PROT SERPL-MCNC: 7.4 G/DL (ref 6.4–8.3)
RBC # BLD AUTO: 4.48 10E6/UL (ref 3.8–5.2)
SODIUM SERPL-SCNC: 140 MMOL/L (ref 135–145)
TRIGL SERPL-MCNC: 170 MG/DL
TSH SERPL DL<=0.005 MIU/L-ACNC: 1.58 UIU/ML (ref 0.3–4.2)
WBC # BLD AUTO: 11.8 10E3/UL (ref 4–11)

## 2024-01-29 PROCEDURE — 80061 LIPID PANEL: CPT

## 2024-01-29 PROCEDURE — 85027 COMPLETE CBC AUTOMATED: CPT

## 2024-01-29 PROCEDURE — 80053 COMPREHEN METABOLIC PANEL: CPT

## 2024-01-29 PROCEDURE — 36415 COLL VENOUS BLD VENIPUNCTURE: CPT

## 2024-01-29 PROCEDURE — 84443 ASSAY THYROID STIM HORMONE: CPT

## 2024-01-29 PROCEDURE — 83036 HEMOGLOBIN GLYCOSYLATED A1C: CPT

## 2024-02-04 NOTE — PROGRESS NOTES
Medication Therapy Management (MTM) Encounter    ASSESSMENT:                            Medication Adherence/Access: see below    Weight Management:   Limited options for med management for weight loss at this time due to naltrexone on backorder, phentermine, bupropion, and topiramate contraindicated due to glaucoma (patient preference to avoid anything that could worsen glaucoma) and GLP-1 RA not covered on insurance. Could try metformin which would also help with prediabetes and reduce insulin resistance which could help to lower weight. Could add naltrexone when available.    Room for improvement working on lifestyle changes. Would benefit from starting to meal prep healthier meal options and adding in resistance training. Can resend dietitian recommendations.     Hyperlipidemia:   LDL not at goal <100. Low 10-year ASCVD risk per ACC/AHA calculator since risk is <5%. Reasonable to work on diet changes first.     Migraine:   stable    Arthritis   Plans to establish with new primary care provider to discuss symptoms more    Glaucoma:   stable     Intertrigo/seborrheic keratosis  stable    Supplements:   stable    PLAN:                            Patient can call other pharmacies to see if naltrexone is available (ex: [facility] a [facility] and a [facility]). Can send new prescription if needed.   If not available, will plan to start metformin  mg once daily and tapering up to 2g daily   Will resend dietitian recommendations - patient to try meal prepping     Follow-up: Lianet Davey PA-C in 2 months, medication therapy management in 4 months    SUBJECTIVE/OBJECTIVE:                          Shae Pierre is a 58 year old female contacted via secure video for an initial visit. She was referred to me from Lianet Davey PA-C .     Reason for visit: Zepbound follow-up    Allergies/ADRs: Reviewed in chart  Past Medical History: Reviewed in chart  Tobacco: She reports that she quit smoking about 13 years ago. Her smoking  "use included cigarettes. She has never used smokeless tobacco.  Alcohol: 4-6 drinks 2 days per month, sometimes a drink after work     Medication Adherence/Access: Patient takes medications 2 time(s) per day. Several medication backorders limiting med options for weight management      Weight Management:   No current medications  Zepbound not covered  Naltrexone on backorder    Followed by Lianet Davey PA-C, seen 1/10/24 for New Medical Weight Management.   Diet/Eating Habits: Patient reports variable meal times; very busy with work. Eats what she can on a short break. Met with dietitian but has had trouble finding recommendations on mychart. Feels full when she eats.   Exercise/Activity: Patient reports walking in the morning before work for almost an hour. Interested in incorporating resistance training/chair yoga.  Tried/Failed/Contraindicated Medications:   Phentermine and topiramate- no due to glaucoma  GLP-1 RA- not covered on insurance    Current weight today: 196 lbs 0 oz  Initial consult weight: 193 lbs; +3lb weight increase since initial consult  Wt Readings from Last 4 Encounters:   02/05/24 88.9 kg (196 lb)   01/10/24 87.5 kg (193 lb)   06/01/23 91.4 kg (201 lb 6.4 oz)   09/08/22 90.3 kg (199 lb)       Estimated body mass index is 37.05 kg/m  as calculated from the following:    Height as of this encounter: 1.549 m (5' 0.98\").    Weight as of this encounter: 88.9 kg (196 lb).    Hemoglobin A1C   Date Value Ref Range Status   01/29/2024 5.8 (H) 0.0 - 5.6 % Final     Comment:     Normal <5.7%   Prediabetes 5.7-6.4%    Diabetes 6.5% or higher     Note: Adopted from ADA consensus guidelines.   12/31/2012 5.6 4.3 - 6.0 % Final     Hyperlipidemia:   No current medications  The 10-year ASCVD risk score (Elian SON, et al., 2019) is: 2.3%    Values used to calculate the score:      Age: 58 years      Sex: Female      Is Non- : No      Diabetic: No      Tobacco smoker: No      Systolic " "Blood Pressure: 126 mmHg      Is BP treated: No      HDL Cholesterol: 64 mg/dL      Total Cholesterol: 244 mg/dL  Recent Labs   Lab Test 01/29/24  1341 11/03/22  0806   CHOL 244* 230*   HDL 64 49*   * 144*   TRIG 170* 184*       Migraine:   Acute medications  Sumatriptan 100 mg  Hasn't had a migraine in a long time    Arthritis   Advil 200 or 400 mg 1-2 times daily for stiff joints   This is effective     Glaucoma:   Latanoprost 1 drop each eye at bedtime  Follows with eye doctor   Stable vision     Intertrigo/seborrheic keratosis  Clotrimazole cream - abdomen   Nystatin powder- abdomen   Betamethasone cream - hands  Uses as needed. Mostly in the summer. Following with derm once a year. No issues today.     Supplements:   Glucosamine 1 tablet 2 times daily  Fish 1 capsule 2 times daily   Multivitamin once daily   Probiotic once daily   No reported issues at this time.     Today's Vitals: Ht 1.549 m (5' 0.98\")   Wt 88.9 kg (196 lb)   LMP 04/05/2012   BMI 37.05 kg/m    ----------------    I spent 40 minutes with this patient today. All changes were made via collaborative practice agreement with Lianet Davey PA-C. A copy of the visit note was provided to the patient's provider(s).    A summary of these recommendations was sent via [employer].    Telemedicine Visit Details  Type of service:  Video Conference via [employer]  Start Time:  2:05 pm  End Time:  2:45 pm     Medication Therapy Recommendations  Class 2 severe obesity with serious comorbidity and body mass index (BMI) of 37.0 to 37.9 in adult, unspecified obesity type (H)    Rationale: Untreated condition - Needs additional medication therapy - Indication   Recommendation: Start Medication - metFORMIN 500 MG 24 hr tablet   Status: Contact Provider - Awaiting Response              "

## 2024-02-05 ENCOUNTER — VIRTUAL VISIT (OUTPATIENT)
Dept: CARDIOLOGY | Facility: CLINIC | Age: 59
End: 2024-02-05
Payer: COMMERCIAL

## 2024-02-05 ENCOUNTER — TELEPHONE (OUTPATIENT)
Dept: ENDOCRINOLOGY | Facility: CLINIC | Age: 59
End: 2024-02-05
Payer: COMMERCIAL

## 2024-02-05 VITALS — BODY MASS INDEX: 37 KG/M2 | WEIGHT: 196 LBS | HEIGHT: 61 IN

## 2024-02-05 DIAGNOSIS — E66.812 CLASS 2 SEVERE OBESITY WITH SERIOUS COMORBIDITY AND BODY MASS INDEX (BMI) OF 37.0 TO 37.9 IN ADULT, UNSPECIFIED OBESITY TYPE (H): Primary | ICD-10-CM

## 2024-02-05 DIAGNOSIS — G43.819 OTHER MIGRAINE WITHOUT STATUS MIGRAINOSUS, INTRACTABLE: ICD-10-CM

## 2024-02-05 DIAGNOSIS — E66.01 CLASS 2 SEVERE OBESITY WITH SERIOUS COMORBIDITY AND BODY MASS INDEX (BMI) OF 37.0 TO 37.9 IN ADULT, UNSPECIFIED OBESITY TYPE (H): Primary | ICD-10-CM

## 2024-02-05 DIAGNOSIS — M19.90 ARTHRITIS: ICD-10-CM

## 2024-02-05 DIAGNOSIS — H40.9 GLAUCOMA, UNSPECIFIED GLAUCOMA TYPE, UNSPECIFIED LATERALITY: ICD-10-CM

## 2024-02-05 DIAGNOSIS — L30.4 INTERTRIGO: ICD-10-CM

## 2024-02-05 DIAGNOSIS — Z78.9 TAKES DIETARY SUPPLEMENTS: ICD-10-CM

## 2024-02-05 DIAGNOSIS — L82.0 INFLAMED SEBORRHEIC KERATOSIS: ICD-10-CM

## 2024-02-05 RX ORDER — LACTOBACILLUS RHAMNOSUS GG 10B CELL
1 CAPSULE ORAL DAILY
COMMUNITY
End: 2024-07-15

## 2024-02-05 ASSESSMENT — PAIN SCALES - GENERAL: PAINLEVEL: NO PAIN (0)

## 2024-02-05 NOTE — TELEPHONE ENCOUNTER
"Prior Authorization Retail Medication Request    Medication/Dose: Wegovy  Diagnosis and ICD code (if different than what is on RX):    New/renewal/insurance change PA/secondary ins. PA:  Previously Tried and Failed: diet and lifestyle changes x 3 months  Rationale:  BMI >30 with co morbidities (hyperlipidemia, pre-diabetes    Insurance   Primary: [employer]  Insurance ID:  [id number]     Pharmacy Information (if different than what is on RX)      Estimated body mass index is 37.05 kg/m  as calculated from the following:    Height as of an earlier encounter on 2/5/24: 1.549 m (5' 0.98\").    Weight as of an earlier encounter on 2/5/24: 88.9 kg (196 lb).      "

## 2024-02-05 NOTE — LETTER
2/5/2024      RE: Shae Pierre  [id number] [address] [place] [id number]       Dear Colleague,    Thank you for the opportunity to participate in the care of your patient, Shae Pierre, at the [facility] [place] [facility]. Please see a copy of my visit note below.    Medication Therapy Management (MTM) Encounter    ASSESSMENT:                            Medication Adherence/Access: see below    Weight Management:   Limited options for med management for weight loss at this time due to naltrexone on backorder, phentermine, bupropion, and topiramate contraindicated due to glaucoma (patient preference to avoid anything that could worsen glaucoma) and GLP-1 RA not covered on insurance. Could try metformin which would also help with prediabetes. Could add naltrexone when available.    Room for improvement working on lifestyle changes. Would benefit from starting to meal prep healthier meal options and adding in resistance training. Can resend dietitian recommendations.     Hyperlipidemia:   LDL not at goal <100. Low 10-year ASCVD risk per ACC/AHA calculator since risk is <5%. Reasonable to work on diet changes first.     Migraine:   stable    Arthritis   Plans to establish with new primary care provider to discuss symptoms more    Glaucoma:   stable     Intertrigo/seborrheic keratosis  stable    Supplements:   stable    PLAN:                            Consider starting metformin  mg once daily and tapering up to 2g daily or starting bupropion with plans to add naltrexone when available.   Will resend dietitian recommendations - patient to try meal prepping     Follow-up: based on above; Lianet Davey PA-C in 2 months    SUBJECTIVE/OBJECTIVE:                          Shae Pierre is a 58 year old female contacted via secure video for an initial visit. She was referred to me from Lianet Davey PA-C .     Reason for visit: Zepbound  "follow-up    Allergies/ADRs: Reviewed in chart  Past Medical History: Reviewed in chart  Tobacco: She reports that she quit smoking about 13 years ago. Her smoking use included cigarettes. She has never used smokeless tobacco.  Alcohol: 4-6 drinks 2 days per month, sometimes a drink after work     Medication Adherence/Access: Patient takes medications 2 time(s) per day. Several medication backorders limiting med options for weight management      Weight Management:   No current medications  Zepbound not covered  Naltrexone on backorder    Followed by Lianet Davey PA-C, seen 1/10/24 for [facility] Medical Weight Management.   Diet/Eating Habits: Patient reports variable meal times; very busy with work. Eats what she can on a short break. Met with dietitian but has had trouble finding recommendations on mychart. Feels full when she eats.   Exercise/Activity: Patient reports walking in the morning before work for almost an hour. Interested in incorporating resistance training/chair yoga.  Tried/Failed/Contraindicated Medications:   Phentermine and topiramate- no due to glaucoma  GLP-1 RA- not covered on insurance    Current weight today: 196 lbs 0 oz  Initial consult weight: 193 lbs; +3lb weight increase since initial consult  Wt Readings from Last 4 Encounters:   02/05/24 88.9 kg (196 lb)   01/10/24 87.5 kg (193 lb)   06/01/23 91.4 kg (201 lb 6.4 oz)   09/08/22 90.3 kg (199 lb)       Estimated body mass index is 37.05 kg/m  as calculated from the following:    Height as of this encounter: 1.549 m (5' 0.98\").    Weight as of this encounter: 88.9 kg (196 lb).    Hemoglobin A1C   Date Value Ref Range Status   01/29/2024 5.8 (H) 0.0 - 5.6 % Final     Comment:     Normal <5.7%   Prediabetes 5.7-6.4%    Diabetes 6.5% or higher     Note: Adopted from ADA consensus guidelines.   12/31/2012 5.6 4.3 - 6.0 % Final     Hyperlipidemia:   No current medications  The 10-year ASCVD risk score (Elian SON, et al., 2019) is: 2.3%    Values " "used to calculate the score:      Age: 58 years      Sex: Female      Is Non- : No      Diabetic: No      Tobacco smoker: No      Systolic Blood Pressure: 126 mmHg      Is BP treated: No      HDL Cholesterol: 64 mg/dL      Total Cholesterol: 244 mg/dL  Recent Labs   Lab Test 01/29/24  1341 11/03/22  0806   CHOL 244* 230*   HDL 64 49*   * 144*   TRIG 170* 184*       Migraine:   Acute medications  Sumatriptan 100 mg  Hasn't had a migraine in a long time    Arthritis   Advil 200 or 400 mg 1-2 times daily for stiff joints   This is effective     Glaucoma:   Latanoprost 1 drop each eye at bedtime  Follows with eye doctor   Stable vision     Intertrigo/seborrheic keratosis  Clotrimazole cream - abdomen   Nystatin powder- abdomen   Betamethasone cream - hands  Uses as needed. Mostly in the summer. Following with derm once a year. No issues today.     Supplements:   Glucosamine 1 tablet 2 times daily  Fish 1 capsule 2 times daily   Multivitamin once daily   Probiotic once daily   No reported issues at this time.     Today's Vitals: Ht 1.549 m (5' 0.98\")   Wt 88.9 kg (196 lb)   LMP 04/05/2012   BMI 37.05 kg/m    ----------------    I spent 40 minutes with this patient today. All changes were made via collaborative practice agreement with Lianet Davey PA-C. A copy of the visit note was provided to the patient's provider(s).    A summary of these recommendations was sent via Ambarella.    Telemedicine Visit Details  Type of service:  Video Conference via Solar Notion  Start Time:  2:05 pm  End Time:  2:45 pm     Medication Therapy Recommendations  Class 2 severe obesity with serious comorbidity and body mass index (BMI) of 37.0 to 37.9 in adult, unspecified obesity type (H)    Rationale: Untreated condition - Needs additional medication therapy - Indication   Recommendation: Start Medication - metFORMIN 500 MG 24 hr tablet   Status: Contact Provider - Awaiting Response                Virtual Visit " Details    Type of service:  Video Visit     Originating Location (pt. Location): Home  {PROVIDER LOCATION On-site should be selected for visits conducted from your clinic location or adjoining [facility] hospital, academic office, or other nearby [facility] building. Off-site should be selected for all other provider locations, including home:991154}  Distant Location (provider location):  Off-site  Platform used for Video Visit: AmWell      Please do not hesitate to contact me if you have any questions/concerns.     Sincerely,     MARTA LIEBERMAN RPH

## 2024-02-05 NOTE — NURSING NOTE
Is the patient currently in the state of [place]? YES    Visit mode:VIDEO    If the visit is dropped, the patient can be reconnected by: VIDEO VISIT: Text to cell phone:   Telephone Information:   Mobile [phone]       Will anyone else be joining the visit? NO  (If patient encounters technical issues they should call [phone]    How would you like to obtain your AVS? MyChart    Are changes needed to the allergy or medication list? Pt stated no changes to allergies and Pt stated no med changes    Reason for visit: Medication Therapy Management    Zee KHANF

## 2024-02-05 NOTE — PROGRESS NOTES
Virtual Visit Details    Type of service:  Video Visit     Originating Location (pt. Location): Home    Distant Location (provider location):  Off-site  Platform used for Video Visit: Kati

## 2024-02-05 NOTE — Clinical Note
Nathaniel Sue- naltrexone is also on backorder. Limited options due to glaucoma. Only other med option I can think of at this time is metformin which would also be helpful for pre-DM. Could add naltrexone when it's available again. What do you think? Thank you - Jessy

## 2024-02-05 NOTE — PATIENT INSTRUCTIONS
"Recommendations from today's MTM visit:                                                    MTM (medication therapy management) is a service provided by a clinical pharmacist designed to help you get the most of out of your medicines.   Today we reviewed what your medicines are for, how to know if they are working, that your medicines are safe and how to make your medicine regimen as easy as possible.      Consider starting metformin  mg once daily and tapering up by 500 mg once weekly until taking 2000 mg daily.  Will resend dietitian recommendations - patient to try meal prepping. Will also send recommendations in the  mail so she can have a print out.    Follow-up: based on above; Lianet Davey PA-C in 2 months    It was great speaking with you today.  I value your experience and would be very thankful for your time in providing feedback in our clinic survey. In the next few days, you may receive an email or text message from [employer] with a link to a survey related to your  clinical pharmacist.\"     To schedule another MTM appointment, please call the clinic directly or you may call the MTM scheduling line at [phone].    My Clinical Pharmacist's contact information:                                                      Please feel free to contact me with any questions or concerns you have.      Jessy Bah, PharmD, AAHIVP  Medication Therapy Management Pharmacist     "

## 2024-02-07 NOTE — PROGRESS NOTES
Called patient to follow-up on recommendations from today's visit. LVM to call back.     Jessy Bah, PharmD, [facility]  Medication Therapy Management Pharmacist

## 2024-02-12 SDOH — HEALTH STABILITY: PHYSICAL HEALTH: ON AVERAGE, HOW MANY DAYS PER WEEK DO YOU ENGAGE IN MODERATE TO STRENUOUS EXERCISE (LIKE A BRISK WALK)?: 5 DAYS

## 2024-02-12 SDOH — HEALTH STABILITY: PHYSICAL HEALTH: ON AVERAGE, HOW MANY MINUTES DO YOU ENGAGE IN EXERCISE AT THIS LEVEL?: 60 MIN

## 2024-02-12 ASSESSMENT — SOCIAL DETERMINANTS OF HEALTH (SDOH): HOW OFTEN DO YOU GET TOGETHER WITH FRIENDS OR RELATIVES?: TWICE A WEEK

## 2024-02-15 ENCOUNTER — OFFICE VISIT (OUTPATIENT)
Dept: FAMILY MEDICINE | Facility: CLINIC | Age: 59
End: 2024-02-15
Payer: COMMERCIAL

## 2024-02-15 VITALS
WEIGHT: 195.4 LBS | OXYGEN SATURATION: 97 % | DIASTOLIC BLOOD PRESSURE: 68 MMHG | HEIGHT: 61 IN | HEART RATE: 71 BPM | BODY MASS INDEX: 36.89 KG/M2 | RESPIRATION RATE: 16 BRPM | SYSTOLIC BLOOD PRESSURE: 116 MMHG | TEMPERATURE: 98 F

## 2024-02-15 DIAGNOSIS — Z00.00 ROUTINE GENERAL MEDICAL EXAMINATION AT A HEALTH CARE FACILITY: Primary | ICD-10-CM

## 2024-02-15 DIAGNOSIS — I83.90 VARICOSE VEINS OF ANKLE: ICD-10-CM

## 2024-02-15 DIAGNOSIS — E66.812 CLASS 2 OBESITY WITH BODY MASS INDEX (BMI) OF 36.0 TO 36.9 IN ADULT, UNSPECIFIED OBESITY TYPE, UNSPECIFIED WHETHER SERIOUS COMORBIDITY PRESENT: ICD-10-CM

## 2024-02-15 DIAGNOSIS — G43.109 MIGRAINE WITH AURA AND WITHOUT STATUS MIGRAINOSUS, NOT INTRACTABLE: ICD-10-CM

## 2024-02-15 PROCEDURE — 99396 PREV VISIT EST AGE 40-64: CPT | Performed by: FAMILY MEDICINE

## 2024-02-15 PROCEDURE — 99213 OFFICE O/P EST LOW 20 MIN: CPT | Mod: 25 | Performed by: FAMILY MEDICINE

## 2024-02-15 RX ORDER — SUMATRIPTAN 100 MG/1
100 TABLET, FILM COATED ORAL
Qty: 12 TABLET | Refills: 4 | Status: SHIPPED | OUTPATIENT
Start: 2024-02-15

## 2024-02-15 ASSESSMENT — PAIN SCALES - GENERAL: PAINLEVEL: NO PAIN (0)

## 2024-02-15 NOTE — PROGRESS NOTES
"Preventive Care Visit  [facility]  TESSA HE DO, Family Medicine  Feb 15, 2024    Assessment & Plan     Routine general medical examination at a health care facility  Increase exercise as able to.     Varicose veins of ankle  Compression stocking ordered. Referral for vascular surgery  - Vascular Surgery Referral    Migraine with aura and without status migrainosus, not intractable  Using very infrequently, refilled  - SUMAtriptan (IMITREX) 100 MG tablet  Dispense: 12 tablet; Refill: 4    Class 2 obesity with body mass index (BMI) of 36.0 to 36.9 in adult, unspecified obesity type, unspecified whether serious comorbidity present  Working with metabolic clinic, recommended increasing exercise, strength training and mediterranean diet      BMI  Estimated body mass index is 36.94 kg/m  as calculated from the following:    Height as of this encounter: 1.549 m (5' 0.98\").    Weight as of this encounter: 88.6 kg (195 lb 6.4 oz).   Weight management plan: Discussed healthy diet and exercise guidelines    Counseling  Appropriate preventive services were discussed with this patient, including applicable screening as appropriate for fall prevention, nutrition, physical activity, Tobacco-use cessation, weight loss and cognition.  Checklist reviewing preventive services available has been given to the patient.  Reviewed patient's diet, addressing concerns and/or questions.       Sorin Kaufman is a 58 year old, presenting for the following:  Physical        2/15/2024     7:39 AM   Additional Questions   Roomed by Krista FLEMING MA   Accompanied by Self        Health Care Directive  Patient has a Health Care Directive on file    HPI  Look at right eye- thinks she has an ulcer    Ears-feels like there is fluid in them    Fatigue-  Would like to discuss her fatigue, is always tired        2/12/2024   General Health   How would you rate your overall physical health? Good   Feel stress (tense, " anxious, or unable to sleep) Not at all         2024   Nutrition   Three or more servings of calcium each day? Yes   Diet: Regular (no restrictions)   How many servings of fruit and vegetables per day? (!) 0-1   How many sweetened beverages each day? 0-1         2024   Exercise   Days per week of moderate/strenous exercise 5 days   Average minutes spent exercising at this level 60 min         2024   Social Factors   Frequency of gathering with friends or relatives Twice a week   Worry food won't last until get money to buy more No   Food not last or not have enough money for food? No   Do you have housing?  Yes   Are you worried about losing your housing? No   Lack of transportation? No   Unable to get utilities (heat,electricity)? No         2024   Fall Risk   Fallen 2 or more times in the past year? No   Trouble with walking or balance? No          2024   Dental   Dentist two times every year? Yes         2024   TB Screening   Were you born outside of [place]?  No       Today's PHQ-2 Score:       2024    12:52 PM   PHQ-2 (  [employer])   Q1: Little interest or pleasure in doing things 0   Q2: Feeling down, depressed or hopeless 0   PHQ-2 Score 0         2024   Substance Use   Alcohol more than 3/day or more than 7/wk No   Do you use any other substances recreationally? No     Social History     Tobacco Use    Smoking status: Former     Types: Cigarettes     Quit date: 2010     Years since quittin.8     Passive exposure: Past    Smokeless tobacco: Never   Vaping Use    Vaping Use: Never used   Substance Use Topics    Alcohol use: Yes     Comment: occ    Drug use: No         2024   Breast Cancer Screening   Family history of breast, colon, or ovarian cancer? Yes         2024   LAST FHS-7 RESULTS   1st degree relative breast or ovarian cancer Yes   Any relative bilateral breast cancer Unknown   Any male have breast cancer No   Any woman have breast and ovarian  cancer Unknown   Any woman with breast cancer before 50yrs Unknown   2 or more relatives with breast and/or ovarian cancer Unknown   2 or more relatives with breast and/or bowel cancer Unknown        Mammogram Screening - Mammogram every 1-2 years updated in Health Maintenance based on mutual decision making        2/12/2024   STI Screening   New sexual partner(s) since last STI/HIV test? No     History of abnormal Pap smear: NO - age 30-65 PAP every 5 years with negative HPV co-testing recommended        1/23/2012    11:14 AM 12/22/2008    12:00 AM 5/21/2008    12:00 AM   PAP / HPV   PAP (Historical) OTHER-NIL, See Result  NIL  OTHER-NIL EM>40      The 10-year ASCVD risk score (Elian DK, et al., 2019) is: 1.9%    Values used to calculate the score:      Age: 58 years      Sex: Female      Is Non- : No      Diabetic: No      Tobacco smoker: No      Systolic Blood Pressure: 116 mmHg      Is BP treated: No      HDL Cholesterol: 64 mg/dL      Total Cholesterol: 244 mg/dL    Reviewed and updated as needed this visit by Provider                    Past Medical History:   Diagnosis Date    Arthritis 2000    Dysplasia of cervix (uteri) 1998    Fibroids 02/22/2012    Other abnormal Pap smear and cervical HPV (human papapillomavirus) 09/29/2008    EM>40 and pos HPV unknown type rec rpt pap 6 mos/11/08  1/26/2009 F/u pap was normal  Will have next pap in 1 year.     Other acne     PONV (postoperative nausea and vomiting)      Past Surgical History:   Procedure Laterality Date    BIOPSY      COLONOSCOPY N/A 04/16/2018    Procedure: COLONOSCOPY;  Colonoscopy;  Surgeon: Chuck Donovan MD;  Location: [facility]    EXCISE MASS TRUNK  07/13/2012    Procedure: EXCISE MASS TRUNK;  Excisional Removal Chest Wall Cysts X2;  Surgeon: Dennys Henriquez MD;  Location: [facility]    HYSTERECTOMY, PAP NO LONGER INDICATED      LAPAROSCOPIC ASSISTED HYSTERECTOMY VAGINAL, BILATERAL SALPINGO-OOPHORECTOMY, COMBINED   "04/05/2012    [facility]    LAPAROSCOPIC CHOLECYSTECTOMY N/A 11/17/2021    Procedure: CHOLECYSTECTOMY, LAPAROSCOPIC;  Surgeon: Chip Santana MD;  Location: [facility]    ORTHOPEDIC SURGERY  4/2021, 2022    SURGICAL HISTORY OF -       LEEP    SURGICAL HISTORY OF -       Cryotherapy     SURGICAL HISTORY OF -       Colpo    SURGICAL HISTORY OF -  Left     meniscal injury    VASCULAR SURGERY  2012?     Review of Systems  Constitutional, HEENT, cardiovascular, pulmonary, gi and gu systems are negative, except as otherwise noted.     Objective    Exam  /68 (BP Location: Right arm, Patient Position: Chair, Cuff Size: Adult Large)   Pulse 71   Temp 98  F (36.7  C)   Resp 16   Ht 1.549 m (5' 0.98\")   Wt 88.6 kg (195 lb 6.4 oz)   LMP 04/05/2012   SpO2 97%   BMI 36.94 kg/m     Estimated body mass index is 36.94 kg/m  as calculated from the following:    Height as of this encounter: 1.549 m (5' 0.98\").    Weight as of this encounter: 88.6 kg (195 lb 6.4 oz).    Physical Exam  Constitutional:       Appearance: Normal appearance.   HENT:      Head: Normocephalic.   Eyes:      Conjunctiva/sclera: Conjunctivae normal.   Cardiovascular:      Rate and Rhythm: Normal rate and regular rhythm.      Pulses: Normal pulses.      Heart sounds: Normal heart sounds.   Pulmonary:      Effort: Pulmonary effort is normal.      Breath sounds: Normal breath sounds.   Abdominal:      General: Bowel sounds are normal.   Skin:     General: Skin is warm and dry.   Neurological:      Mental Status: She is alert.   Psychiatric:         Thought Content: Thought content normal.         Judgment: Judgment normal.         Signed Electronically by: TESSA HE DO    "

## 2024-02-15 NOTE — PATIENT INSTRUCTIONS
[facility] - you tube   Preventive Care Advice   This is general advice given by our system to help you stay healthy. However, your care team may have specific advice just for you. Please talk to your care team about your preventive care needs.  Nutrition  Eat 5 or more servings of fruits and vegetables each day.  Try wheat bread, brown rice and whole grain pasta (instead of white bread, rice, and pasta).  Get enough calcium and vitamin D. Check the label on foods and aim for 100% of the RDA (recommended daily allowance).  Lifestyle  Exercise at least 150 minutes each week  (30 minutes a day, 5 days a week).  Do muscle strengthening activities 2 days a week. These help control your weight and prevent disease.  No smoking.  Wear sunscreen to prevent skin cancer.  Have a dental exam and cleaning every 6 months.  Yearly exams  See your health care team every year to talk about:  Any changes in your health.  Any medicines your care team has prescribed.  Preventive care, family planning, and ways to prevent chronic diseases.  Shots (vaccines)   HPV shots (up to age 26), if you've never had them before.  Hepatitis B shots (up to age 59), if you've never had them before.  COVID-19 shot: Get this shot when it's due.  Flu shot: Get a flu shot every year.  Tetanus shot: Get a tetanus shot every 10 years.  Pneumococcal, hepatitis A, and RSV shots: Ask your care team if you need these based on your risk.  Shingles shot (for age 50 and up)  General health tests  Diabetes screening:  Starting at age 35, Get screened for diabetes at least every 3 years.  If you are younger than age 35, ask your care team if you should be screened for diabetes.  Cholesterol test: At age 39, start having a cholesterol test every 5 years, or more often if advised.  Bone density scan (DEXA): At age 50, ask your care team if you should have this scan for osteoporosis (brittle bones).  Hepatitis C: Get tested at least once in your life.  STIs  (sexually transmitted infections)  Before age 24: Ask your care team if you should be screened for STIs.  After age 24: Get screened for STIs if you're at risk. You are at risk for STIs (including HIV) if:  You are sexually active with more than one person.  You don't use condoms every time.  You or a partner was diagnosed with a sexually transmitted infection.  If you are at risk for HIV, ask about PrEP medicine to prevent HIV.  Get tested for HIV at least once in your life, whether you are at risk for HIV or not.  Cancer screening tests  Cervical cancer screening: If you have a cervix, begin getting regular cervical cancer screening tests starting at age 21.  Breast cancer scan (mammogram): If you've ever had breasts, begin having regular mammograms starting at age 40. This is a scan to check for breast cancer.  Colon cancer screening: It is important to start screening for colon cancer at age 45.  Have a colonoscopy test every 10 years (or more often if you're at risk) Or, ask your provider about stool tests like a FIT test every year or Cologuard test every 3 years.  To learn more about your testing options, visit:   https://www.[employer]/147250.pdf.  For help making a decision, visit:   https://bit.ly/ox08971.  Prostate cancer screening test: If you have a prostate, ask your care team if a prostate cancer screening test (PSA) at age 55 is right for you.  Lung cancer screening: If you are a current or former smoker ages 50 to 80, ask your care team if ongoing lung cancer screenings are right for you.  For informational purposes only. Not to replace the advice of your health care provider. Copyright   2023 [employer]. All rights reserved. Clinically reviewed by the [facility]itions Program. [employer] 247822 - REV 01/24.

## 2024-02-21 ENCOUNTER — TELEPHONE (OUTPATIENT)
Dept: ENDOCRINOLOGY | Facility: CLINIC | Age: 59
End: 2024-02-21

## 2024-02-21 NOTE — TELEPHONE ENCOUNTER
Patient was scheduled to follow up today with dietitian. She has not been able to get started on a weight loss medication and admits to not implementing any other changes yet. Plan is to reschedule to check in about 4 weeks after starting her medication.     Jo Ann King RD, LD  Clinic # [phone]

## 2024-02-23 DIAGNOSIS — R73.03 PRE-DIABETES: Primary | ICD-10-CM

## 2024-02-23 RX ORDER — METFORMIN HCL 500 MG
TABLET, EXTENDED RELEASE 24 HR ORAL
Qty: 60 TABLET | Refills: 2 | Status: SHIPPED | OUTPATIENT
Start: 2024-02-23 | End: 2024-05-21

## 2024-02-23 NOTE — PROGRESS NOTES
Patient would like to try meformin since she's unable to find naltrexone in stock at this time.     Will order per CPA with SIVAN Floyd, PharmD, AAHIVP  Medication Therapy Management Pharmacist

## 2024-05-22 ENCOUNTER — VIRTUAL VISIT (OUTPATIENT)
Dept: ENDOCRINOLOGY | Facility: CLINIC | Age: 59
End: 2024-05-22
Payer: COMMERCIAL

## 2024-05-22 VITALS — HEIGHT: 61 IN | BODY MASS INDEX: 34.93 KG/M2 | WEIGHT: 185 LBS

## 2024-05-22 DIAGNOSIS — E66.812 CLASS 2 SEVERE OBESITY WITH SERIOUS COMORBIDITY AND BODY MASS INDEX (BMI) OF 36.0 TO 36.9 IN ADULT, UNSPECIFIED OBESITY TYPE (H): Primary | ICD-10-CM

## 2024-05-22 DIAGNOSIS — E66.01 CLASS 2 SEVERE OBESITY WITH SERIOUS COMORBIDITY AND BODY MASS INDEX (BMI) OF 36.0 TO 36.9 IN ADULT, UNSPECIFIED OBESITY TYPE (H): Primary | ICD-10-CM

## 2024-05-22 PROCEDURE — 99215 OFFICE O/P EST HI 40 MIN: CPT | Mod: 95

## 2024-05-22 ASSESSMENT — PAIN SCALES - GENERAL: PAINLEVEL: NO PAIN (0)

## 2024-05-22 NOTE — Clinical Note
Shae Leiva reached out to you as well. She started Metformin and had side effects of nausea that was not tolerated. She discontinued 2 weeks ago. She saw her eye doctor and had inc IOP and is now very concerned about her glaucoma getting worse. From my understanding and further research, Metformin should not influence glaucoma correct? She is very much convinced it did as it is the only thing she changed in the past month.   Thanks,  SERENITY

## 2024-05-22 NOTE — NURSING NOTE
Is the patient currently in the state of [place]? YES    Visit mode:VIDEO    If the visit is dropped, the patient can be reconnected by: VIDEO VISIT: Text to cell phone:   Telephone Information:   Mobile [phone]       Will anyone else be joining the visit? NO  (If patient encounters technical issues they should call [phone]    How would you like to obtain your AVS? MyChart    Are changes needed to the allergy or medication list? No    Are refills needed on medications prescribed by this physician? NO    Reason for visit: RECHECK    Hemant TREVINO

## 2024-05-22 NOTE — PROGRESS NOTES
"Virtual Visit Details    Type of service:  Video Visit     Originating Location (pt. Location): {video visit patient location:909748::\"Home\"}  {PROVIDER LOCATION On-site should be selected for visits conducted from your clinic location or adjoining [facility] hospital, academic office, or other nearby [facility] building. Off-site should be selected for all other provider locations, including home:754174}  Distant Location (provider location):  {virtual location provider:391390}  Platform used for Video Visit: {Virtual Visit Platforms:722806::\"Everlater\"}    "

## 2024-05-22 NOTE — LETTER
2024       RE: Shae Pierre  [address] [facility] [address]     Dear Colleague,    Thank you for referring your patient, Shae Pierre, to the [facility] WEIGHT MANAGEMENT CLINIC [place] at [facility]. Please see a copy of my visit note below.    Virtual Visit Details    Type of service:  Video Visit     Originating Location (pt. Location): Home    Distant Location (provider location):  Off-site  Platform used for Video Visit: [facility] Medical Weight Management Note     Shae Pierre  MRN:  [id number]  :  1965  VIKY:  2024    Dear TESSA HE, DO,    I had the pleasure of seeing your patient Shae Pierre. She is a 59 year old female who I am continuing to see for treatment of obesity related to:        2024     7:57 AM   --   I have the following health issues associated with obesity High Blood Pressure    Infertility   I have the following symptoms associated with obesity Knee Pain    Infertility (Difficulty Getting Pregnant)    Lower Extremity Swelling    Fatigue    Groin Rash    Hip Pain       Assessment & Plan  Problem List Items Addressed This Visit       Class 2 severe obesity with serious comorbidity and body mass index (BMI) of 36.0 to 36.9 in adult, unspecified obesity type (H) - Primary      Will continue to hold on mediations until glaucoma is well controlled. Can consider starting Naltrexone at that time   Food goals:    Increase protein during the day, options include - protein shakes ([place] or premier) or protein bars   Increase vegetables at dinner time - add frozen vegetables or bag salad   Activity goals - Include 1 other form of activity 1xweek. Youtube exercise options sent via [employer]   eKzia, Sonya, or Dee in 3 months   Lianet Lara PA-C in 6 months     INTERVAL HISTORY:  New MWM   GLP-1 not covered by insurance   Naltrexone unable to find due to shortages   Started Metformin          Had noticed more central adiposity over the past couple of months. However, has lost around 8lb since our last visit.     Anti-obesity medication history    Metformin - on 1000mg had side effects of nausea, decreased back down to 1 tablet and still had side effects. Discontinued due side effects for the past 2 weeks.     Was seen by eye doctor and had increase eye pressure. Due to this is very hesitant about starting any medications today.     Past/Failed/contraindicated:   Phentermine contraindicated due to glaucoma   Topiramate contraindicated due to glaucoma and kidney stones     Recent diet changes: Has been drinking more water. Due to how busy work is, can only do quick easy food. Having a large meal at night when finally stops working. Tried making muffins, but felt like they were too dry.   Snacks - cracker, fruit, cheese, almonds, cheetos, pumpkin seeds   Dinner - frozen pizza, soup, mac and cheese    Recent exercise/activity changes: Walking 5xweek for 3 miles     Recent stressors: increase stress at work. Wants to do better with work/life balance.     Recent sleep changes: has insomnia, so is always harder to sleep.         CURRENT WEIGHT:   185 lbs 0 oz    Initial Weight (lbs): 193 lbs  Last Visits Weight: 88.5 kg (195 lb)  Cumulative weight loss (lbs): 8  Weight Loss Percentage: 4.15%    Wt Readings from Last 5 Encounters:   05/22/24 83.9 kg (185 lb)   02/21/24 88.5 kg (195 lb)   02/15/24 88.6 kg (195 lb 6.4 oz)   02/05/24 88.9 kg (196 lb)   01/10/24 87.5 kg (193 lb)             5/20/2024     6:24 PM   Changes and Difficulties   I have made the following changes to my diet since my last visit: Trying not to snack as much, not eating before dinner   With regards to my diet, I am still struggling with: easy, healthy mealns   I have made the following changes to my activity/exercise since my last visit: same   With regards to my activity/exercise, I am still struggling with: knee pain  "        MEDICATIONS:   Current Outpatient Medications   Medication Sig Dispense Refill    ADVIL 200 MG OR TABS TAKE 1 TO 2 TABLETS EVERY 4 TO 6 HOURS AS NEEDED WITH FOOD 120 0    augmented betamethasone dipropionate (DIPROLENE AF) 0.05 % external cream Apply sparingly to affected area twice daily as needed.  Do not apply to face. 100 g 3    clotrimazole (LOTRIMIN) 1 % external cream Apply topically 2 times daily 113 g 1    GLUCOSAMINE SULFATE PO Take 500 mg by mouth 2 times daily      lactobacillus rhamnosus, GG, (CULTURELL) capsule Take 1 capsule by mouth daily      latanoprost (XALATAN) 0.005 % ophthalmic solution Place 1 drop into each eye EVERY EVENING      MULTI-VITAMIN OR TABS 1 TABLET DAILY 30 0    nystatin (MYCOSTATIN) 155086 UNIT/GM external powder Apply topically 2 times daily 60 g 1    Omega-3 Fatty Acids (FISH OIL) 435 MG CAPS Take 1 capsule by mouth 2 times daily      SUMAtriptan (IMITREX) 100 MG tablet Take 1 tablet (100 mg) by mouth at onset of headache for migraine May repeat in 2 hours prn: max 2/day 12 tablet 4           5/20/2024     6:24 PM   Weight Loss Medication History Reviewed With Patient   Which weight loss medications are you currently taking on a regular basis? None   If you are not taking a weight loss medication that was prescribed to you, please indicate why: I did not like the side effects   Are you having any side effects from the weight loss medication that we have prescribed you? Yes   If you are having side effects please describe: Metformin made me so sick with two pills. Stomach, heart pounding, digestive issues         Objective   Ht 1.549 m (5' 1\")   Wt 83.9 kg (185 lb)   LMP 04/05/2012   BMI 34.96 kg/m      Vitals - Patient Reported  Pain Score: No Pain (0)      PHYSICAL EXAM:    GENERAL: alert and no distress  EYES: Eyes grossly normal to inspection.  No discharge or erythema, or obvious scleral/conjunctival abnormalities.  RESP: No audible wheeze, cough, or visible " cyanosis.    SKIN: Visible skin clear. No significant rash, abnormal pigmentation or lesions.  NEURO: Cranial nerves grossly intact.  Mentation and speech appropriate for age.  PSYCH: Appropriate affect, tone, and pace of words        Sincerely,    Lianet Davey PA-C      42 minutes spent by me on the date of the encounter doing chart review, history and exam, documentation and further activities per the note

## 2024-05-22 NOTE — PROGRESS NOTES
Virtual Visit Details    Type of service:  Video Visit     Originating Location (pt. Location): Home    Distant Location (provider location):  Off-site  Platform used for Video Visit: [facility] Medical Weight Management Note     Shae Pierre  MRN:  [id number]  :  1965  VIKY:  2024    Dear TESSA HE, ,    I had the pleasure of seeing your patient Shae Pierre. She is a 59 year old female who I am continuing to see for treatment of obesity related to:        2024     7:57 AM   --   I have the following health issues associated with obesity High Blood Pressure    Infertility   I have the following symptoms associated with obesity Knee Pain    Infertility (Difficulty Getting Pregnant)    Lower Extremity Swelling    Fatigue    Groin Rash    Hip Pain       Assessment & Plan   Problem List Items Addressed This Visit       Class 2 severe obesity with serious comorbidity and body mass index (BMI) of 36.0 to 36.9 in adult, unspecified obesity type (H) - Primary      Will continue to hold on mediations until glaucoma is well controlled. Can consider starting Naltrexone at that time   Food goals:    Increase protein during the day, options include - protein shakes ([place] or premier) or protein bars   Increase vegetables at dinner time - add frozen vegetables or bag salad   Activity goals - Include 1 other form of activity 1xweek. Youtube exercise options sent via [employer]   Sonya Mast, or Dee in 3 months   Lianet Lara PA-C in 6 months     INTERVAL HISTORY:  New MWM   GLP-1 not covered by insurance   Naltrexone unable to find due to shortages   Started Metformin         Had noticed more central adiposity over the past couple of months. However, has lost around 8lb since our last visit.     Anti-obesity medication history    Metformin - on 1000mg had side effects of nausea, decreased back down to 1 tablet and still had side effects. Discontinued due side effects for the past 2 weeks.      Was seen by eye doctor and had increase eye pressure. Due to this is very hesitant about starting any medications today.     Past/Failed/contraindicated:   Phentermine contraindicated due to glaucoma   Topiramate contraindicated due to glaucoma and kidney stones     Recent diet changes: Has been drinking more water. Due to how busy work is, can only do quick easy food. Having a large meal at night when finally stops working. Tried making muffins, but felt like they were too dry.   Snacks - cracker, fruit, cheese, almonds, cheetos, pumpkin seeds   Dinner - frozen pizza, soup, mac and cheese    Recent exercise/activity changes: Walking 5xweek for 3 miles     Recent stressors: increase stress at work. Wants to do better with work/life balance.     Recent sleep changes: has insomnia, so is always harder to sleep.         CURRENT WEIGHT:   185 lbs 0 oz    Initial Weight (lbs): 193 lbs  Last Visits Weight: 88.5 kg (195 lb)  Cumulative weight loss (lbs): 8  Weight Loss Percentage: 4.15%    Wt Readings from Last 5 Encounters:   05/22/24 83.9 kg (185 lb)   02/21/24 88.5 kg (195 lb)   02/15/24 88.6 kg (195 lb 6.4 oz)   02/05/24 88.9 kg (196 lb)   01/10/24 87.5 kg (193 lb)             5/20/2024     6:24 PM   Changes and Difficulties   I have made the following changes to my diet since my last visit: Trying not to snack as much, not eating before dinner   With regards to my diet, I am still struggling with: easy, healthy mealns   I have made the following changes to my activity/exercise since my last visit: same   With regards to my activity/exercise, I am still struggling with: knee pain         MEDICATIONS:   Current Outpatient Medications   Medication Sig Dispense Refill    ADVIL 200 MG OR TABS TAKE 1 TO 2 TABLETS EVERY 4 TO 6 HOURS AS NEEDED WITH FOOD 120 0    augmented betamethasone dipropionate (DIPROLENE AF) 0.05 % external cream Apply sparingly to affected area twice daily as needed.  Do not apply to face. 100 g 3  "   clotrimazole (LOTRIMIN) 1 % external cream Apply topically 2 times daily 113 g 1    GLUCOSAMINE SULFATE PO Take 500 mg by mouth 2 times daily      lactobacillus rhamnosus, GG, (CULTURELL) capsule Take 1 capsule by mouth daily      latanoprost (XALATAN) 0.005 % ophthalmic solution Place 1 drop into each eye EVERY EVENING      MULTI-VITAMIN OR TABS 1 TABLET DAILY 30 0    nystatin (MYCOSTATIN) 790630 UNIT/GM external powder Apply topically 2 times daily 60 g 1    Omega-3 Fatty Acids (FISH OIL) 435 MG CAPS Take 1 capsule by mouth 2 times daily      SUMAtriptan (IMITREX) 100 MG tablet Take 1 tablet (100 mg) by mouth at onset of headache for migraine May repeat in 2 hours prn: max 2/day 12 tablet 4           5/20/2024     6:24 PM   Weight Loss Medication History Reviewed With Patient   Which weight loss medications are you currently taking on a regular basis? None   If you are not taking a weight loss medication that was prescribed to you, please indicate why: I did not like the side effects   Are you having any side effects from the weight loss medication that we have prescribed you? Yes   If you are having side effects please describe: Metformin made me so sick with two pills. Stomach, heart pounding, digestive issues         Objective    Ht 1.549 m (5' 1\")   Wt 83.9 kg (185 lb)   LMP 04/05/2012   BMI 34.96 kg/m      Vitals - Patient Reported  Pain Score: No Pain (0)      PHYSICAL EXAM:    GENERAL: alert and no distress  EYES: Eyes grossly normal to inspection.  No discharge or erythema, or obvious scleral/conjunctival abnormalities.  RESP: No audible wheeze, cough, or visible cyanosis.    SKIN: Visible skin clear. No significant rash, abnormal pigmentation or lesions.  NEURO: Cranial nerves grossly intact.  Mentation and speech appropriate for age.  PSYCH: Appropriate affect, tone, and pace of words        Sincerely,    Lianet Davey PA-C      42 minutes spent by me on the date of the encounter doing chart " review, history and exam, documentation and further activities per the note

## 2024-05-22 NOTE — PATIENT INSTRUCTIONS
"Nathaniel Kaufman,   Thank you for allowing us the privilege of caring for you. We hope we provided you with the excellent service you deserve.   Please let us know if there is anything else we can do for you so that we can be sure you are completely satisfied with your care experience.    To ensure the quality of our services you may be receiving a patient satisfaction survey from an independent patient satisfaction monitoring company.    The greatest compliment you can give is a \"Likely to Recommend\"    Your visit was with Lianet Davey PA-C today.    Instructions per today's visit:     Will continue to hold on mediations until glaucoma is well controlled. Can consider starting Naltrexone at that time   Food goals:    Increase protein during the day, options include - protein shakes ([place] or premier) or protein bars   Increase vegetables at dinner time - add frozen vegetables or bag salad   Activity goals - Include 1 other form of activity 1xweek. Youtube exercise options sent via [employer]   Sonya Mast, or Dee in 3 months   Lianet Lara PA-C in 6 months     ___________________________________________________________________________  Important contact and scheduling information:  Please call our contact center at [phone] to schedule your next appointments.  For any nursing questions or concerns call Zuleyma Patel LPN at [phone] or Sharon Hartley RN at [phone]  Please call during clinic hours Monday through Friday 8:00a - 4:00p if you have questions or you can contact us via [employer] at anytime and we will reply during clinic hours.    Lab results will be communicated through My Chart or letter (if My Chart not used). Please call the clinic if you have not received communication after 1 week or if you have any questions.?  Clinic Fax: [phone]  __________________________________________________________________________    If labs were ordered today:    Please make an appointment to have them drawn at " "your convenience.     To schedule the Lab Appointment using [employer]:  Select \"Schedule an Appointment\"  Select \"Lab Only\"  For \"A couple of questions\", select \"Other\"  For \"Which locations work for you?, select the location and set up the appointment    To schedule by phone call [phone] to schedule a lab only appointment at any [facility] lab.  ___________________________________________________________________________  Work with A Health !  Virtual Sessions are Available through [facility] Weight Management Clinics    To learn more, call to schedule a free, Health  Q&A appointment: [phone]     What is Health Coaching?  Do you know what you are supposed to do, but you just aren't doing it?  Then, HEALTH COACHING may help you!   Get unstuck and move forward with the support of a professionally trained NBC-HWC (National Board-Certified Health and ) who uses evidence-based approaches to help you move forward with healthy lifestyle changes in the areas of weight loss, stress management and overall well-being.    Health Coaches help you identify goals that will work best for you. Health Coaches provide support and encouragement with overcoming barriers and help you to find inspiration and motivation to lead a healthy lifestyle.    Option one:  Health Coaching 3-Pack; Three, 30-minute Health Coaching Visits, for $99  Visits are done virtually (phone or video)  This is a self pay service; we do not accept insurance for humaira coaching.    Option two:   The 24 week Plan; 11 Health Coaching Visits, and a 7 months subscription to [employer]-- on-demand fitness, nutrition and mindfulness classes, for $499 (employee discounts may be available). Participants will also meet regularly with a weight management Medical Provider and a Registered/Licensed Dietician.  This is a self-pay service; we do not accept insurance for health coaching.    To Schedule a free Health  Q&A " "appointment to learn more,  call [phone].  ____________________________________________________________________  [facility]  Healthy Lifestyle Group    Healthy Lifestyle Group  This is a 60 minute virtual coaching group for those who want to lead a healthier lifestyle. Come together to set goals and overcome barriers in a supportive group environment. We will address the four pillars of health--nutrition, exercise, sleep and emotional well-being.  This group is highly recommended for those who are participating in the 24 week Healthy Lifestyle Plan and our Health Coaching sessions.    WHEN: This group meets the first Friday of the month, 12:30 PM - 1:30 PM online, via a zoom meeting.      FACILITATOR: Led by National Board Certified Health and , Miranda Strauss [facility].    TO REGISTER: Please call the Call Center at [phone] to register. You will get an appointment to attend in J.G. ink. Fifteen minutes prior to the meeting, complete the e-check in and you will get the link to join the meeting.  There is no charge to attend this group and space is limited.      2023 and 2024 Meeting Topics and Dates:    November 3: Introduction to Mindfulness (Learn simple and effective mindfulness practices and how it can benefit you)    December 8: Let's Talk (guided discussion on our wins and challenges)    January 5: New Years Vision: Manifest your Best 2024! (Guided imagery,  journaling and discussion)    February 2: Let's Talk    March 1: 10 Percent Happier by Damion Monroy (Book Bites; a guided discussion on the nuggets of wisdom from favorite wellness books; no need to read the book but highly encouraged)    April 5: Let's Talk    May 3: \"Essentialism; The Disciplined Pursuit of Less by Tyler Salazar (book bites discussion)    June 7: Let's Talk    July 5: NO MEETING, off for the 4th of July Holiday    August 2: The Blue Zones, Secrets for Living a Longer Life by " Damion Partida (book bites discussion)      If you would like bariatric surgery specific support group info please let your care team know.         Thank you,   [facility] Comprehensive Weight Management Team

## 2024-05-28 ENCOUNTER — TELEPHONE (OUTPATIENT)
Dept: ENDOCRINOLOGY | Facility: CLINIC | Age: 59
End: 2024-05-28
Payer: COMMERCIAL

## 2024-05-28 NOTE — TELEPHONE ENCOUNTER
Left Voicemail (1st Attempt) and Sent Mychart (1st Attempt) for the patient to call back and schedule the following:    Appointment type: LINDA FATIMA   Provider: Kezia Rutledge or Kayla  Return date: 3 mos   Specialty phone number: [phone]  Additional appointment(s) needed: yes  Additonal Notes: LINDA FATIMA, Lianet Davey PA-C, 6 mo follow-up ( 11/22/2024 )

## 2024-07-08 DIAGNOSIS — L30.4 INTERTRIGO: ICD-10-CM

## 2024-07-08 NOTE — TELEPHONE ENCOUNTER
Requested Prescriptions   Pending Prescriptions Disp Refills    nystatin (MYCOSTATIN) 677224 UNIT/GM external powder [Pharmacy Med Name: nystatin 100,000 unit/gram topical powder] 60 g 1     Sig: Apply topically 2 times daily       Antifungal Agents Failed - 7/8/2024  7:58 AM        Failed - Always Fail Criteria        Passed - Medication is active on med list        Passed - Recent (12 mo) or future (90 days) visit within the authorizing provider's specialty     The patient must have completed an in-person or virtual visit within the past 12 months or has a future visit scheduled within the next 90 days with the authorizing provider s specialty.  Urgent care and e-visits do not quality as an office visit for this protocol.

## 2024-07-10 RX ORDER — NYSTATIN 100000 [USP'U]/G
POWDER TOPICAL 2 TIMES DAILY
Qty: 60 G | Refills: 1 | Status: SHIPPED | OUTPATIENT
Start: 2024-07-10

## 2024-07-15 ENCOUNTER — OFFICE VISIT (OUTPATIENT)
Dept: FAMILY MEDICINE | Facility: CLINIC | Age: 59
End: 2024-07-15
Payer: COMMERCIAL

## 2024-07-15 VITALS
OXYGEN SATURATION: 97 % | BODY MASS INDEX: 37.95 KG/M2 | HEIGHT: 61 IN | RESPIRATION RATE: 20 BRPM | SYSTOLIC BLOOD PRESSURE: 128 MMHG | WEIGHT: 201 LBS | HEART RATE: 65 BPM | TEMPERATURE: 97.7 F | DIASTOLIC BLOOD PRESSURE: 84 MMHG

## 2024-07-15 DIAGNOSIS — N89.8 VAGINAL DISCHARGE: Primary | ICD-10-CM

## 2024-07-15 DIAGNOSIS — R82.90 FOUL SMELLING URINE: ICD-10-CM

## 2024-07-15 LAB
ALBUMIN UR-MCNC: NEGATIVE MG/DL
APPEARANCE UR: CLEAR
BACTERIA #/AREA URNS HPF: ABNORMAL /HPF
BILIRUB UR QL STRIP: NEGATIVE
CLUE CELLS: ABNORMAL
COLOR UR AUTO: YELLOW
GLUCOSE UR STRIP-MCNC: NEGATIVE MG/DL
HGB UR QL STRIP: NEGATIVE
KETONES UR STRIP-MCNC: NEGATIVE MG/DL
LEUKOCYTE ESTERASE UR QL STRIP: NEGATIVE
NITRATE UR QL: NEGATIVE
PH UR STRIP: 6 [PH] (ref 5–7)
RBC #/AREA URNS AUTO: ABNORMAL /HPF
SP GR UR STRIP: <=1.005 (ref 1–1.03)
SQUAMOUS #/AREA URNS AUTO: ABNORMAL /LPF
TRICHOMONAS, WET PREP: ABNORMAL
UROBILINOGEN UR STRIP-ACNC: 0.2 E.U./DL
WBC #/AREA URNS AUTO: ABNORMAL /HPF
WBC'S/HIGH POWER FIELD, WET PREP: ABNORMAL
YEAST, WET PREP: ABNORMAL

## 2024-07-15 PROCEDURE — 99213 OFFICE O/P EST LOW 20 MIN: CPT

## 2024-07-15 PROCEDURE — 87210 SMEAR WET MOUNT SALINE/INK: CPT

## 2024-07-15 PROCEDURE — 81001 URINALYSIS AUTO W/SCOPE: CPT

## 2024-07-15 RX ORDER — TIMOLOL MALEATE 5 MG/ML
1 SOLUTION/ DROPS OPHTHALMIC DAILY
COMMUNITY
Start: 2024-06-18

## 2024-07-15 ASSESSMENT — PAIN SCALES - GENERAL: PAINLEVEL: NO PAIN (0)

## 2024-07-15 NOTE — PROGRESS NOTES
Assessment & Plan     Vaginal discharge  Patient reports foul smelling vaginal odor and occasional clear discharge for approximately two weeks. Patient denies any new sexual partners or practices. Denies and new home products or environmental changes, summer swimming, hot tub experiences, or new bathing suits, underwear, pajamas.     - Wet prep - Clinic Collect  - Ob/Gyn  Referral; Future    Foul smelling urine  See note above.    - UA with Microscopic reflex to Culture - Clinic Collect  - UA Microscopic with Reflex to Culture    Urine Lab was negative for any infection and Wet prep was negative for BV or yeast infection. Recommended waiting for self resolution or seeing Ob/Gyn for further evaluation.     CONSULTATION/REFERRAL to Patient referred to OB/GYN for further evaluation.     Sorin Kaufman is a 59 year old, presenting for the following health issues:  Vaginal Problem (With pelvic discomfort) and Health Maintenance (Declines vaccinations)        7/15/2024    10:53 AM   Additional Questions   Roomed by Jo Ann SIMPSON LPN   Accompanied by self         7/15/2024    10:53 AM   Patient Reported Additional Medications   Patient reports taking the following new medications no new meds     History of Present Illness       Reason for visit:  Vaginal infection  Symptom onset:  3-7 days ago  Symptoms include:  Odor, drainage  Symptom intensity:  Moderate  Symptom progression:  Worsening  Had these symptoms before:  Yes  Has tried/received treatment for these symptoms:  Yes  Previous treatment was successful:  Yes  Prior treatment description:  Rx  What makes it worse:  No  What makes it better:  No    She eats 0-1 servings of fruits and vegetables daily.She consumes 1 sweetened beverage(s) daily.She exercises with enough effort to increase her heart rate 30 to 60 minutes per day.  She exercises with enough effort to increase her heart rate 5 days per week.   She is taking medications regularly.     Vaginal  "Symptoms  Onset/Duration: about a week   Description:  Vaginal Discharge: clear   Itching (Pruritis): No  Burning sensation:  YES  Odor: YES  Accompanying Signs & Symptoms:  Urinary symptoms: YES- odor to the urine   Abdominal pain: pelvic discomfort  Fever: No  History:   Sexually active: Not for about a year   New Partner: Not since a year ago   Possibility of Pregnancy:  No  Recent antibiotic use: No  Previous vaginitis issues: YES  Precipitating or alleviating factors: None  Therapies tried and outcome: none    Review of Systems  CONSTITUTIONAL: NEGATIVE for fever, chills, change in weight  ENT/MOUTH: NEGATIVE for ear, mouth and throat problems  RESP: NEGATIVE for significant cough or SOB  CV: NEGATIVE for chest pain, palpitations or peripheral edema      Objective    /84 (BP Location: Right arm, Patient Position: Sitting, Cuff Size: Adult Regular)   Pulse 65   Temp 97.7  F (36.5  C) (Tympanic)   Resp 20   Ht 1.549 m (5' 1\")   Wt 91.2 kg (201 lb)   LMP 04/05/2012   SpO2 97%   BMI 37.98 kg/m    Body mass index is 37.98 kg/m .  Physical Exam   GENERAL: alert and no distress  NECK: no adenopathy, no asymmetry, masses, or scars  RESP: lungs clear to auscultation - no rales, rhonchi or wheezes  CV: regular rate and rhythm, normal S1 S2, no S3 or S4, no murmur, click or rub, no peripheral edema  MS: no gross musculoskeletal defects noted, no edema          Signed Electronically by: STEFANIE Escamilla CNP    "

## 2024-10-30 ENCOUNTER — ANCILLARY PROCEDURE (OUTPATIENT)
Dept: MAMMOGRAPHY | Facility: CLINIC | Age: 59
End: 2024-10-30
Attending: FAMILY MEDICINE
Payer: COMMERCIAL

## 2024-10-30 DIAGNOSIS — Z12.31 VISIT FOR SCREENING MAMMOGRAM: ICD-10-CM

## 2024-10-30 PROCEDURE — 77063 BREAST TOMOSYNTHESIS BI: CPT | Mod: TC | Performed by: RADIOLOGY

## 2024-10-30 PROCEDURE — 77067 SCR MAMMO BI INCL CAD: CPT | Mod: TC | Performed by: RADIOLOGY

## 2024-12-16 ENCOUNTER — TELEPHONE (OUTPATIENT)
Dept: DERMATOLOGY | Facility: CLINIC | Age: 59
End: 2024-12-16
Payer: COMMERCIAL

## 2024-12-16 NOTE — TELEPHONE ENCOUNTER
Patient Contact    Attempt # 1    Was call answered?  Yes    Patient was called and worked in a ELADIA spot and patient informed it was for a spot check on back due to changing mole.     Caty Peterson MA  [facility]   [phone]

## 2024-12-16 NOTE — TELEPHONE ENCOUNTER
[facility] Call Center    Phone Message    May a detailed message be left on voicemail: no     Reason for Call: Other: Mole on Back/ growing dry/ comes off in chunks- bleeding/ Changing. First appt with Patito on 7/12/25.  If we can get Shae in sooner she would appreciate that as she is concerned about the growth.  [phone] Shae     Action Taken: Other: [place] DERM    Travel Screening: Not Applicable     Date of Service: 7/12/25

## 2024-12-17 ENCOUNTER — OFFICE VISIT (OUTPATIENT)
Dept: DERMATOLOGY | Facility: CLINIC | Age: 59
End: 2024-12-17
Payer: COMMERCIAL

## 2024-12-17 DIAGNOSIS — L82.1 SEBORRHEIC KERATOSIS: Primary | ICD-10-CM

## 2024-12-17 PROCEDURE — 99213 OFFICE O/P EST LOW 20 MIN: CPT | Performed by: PHYSICIAN ASSISTANT

## 2024-12-17 ASSESSMENT — PAIN SCALES - GENERAL: PAINLEVEL_OUTOF10: NO PAIN (0)

## 2024-12-17 NOTE — PROGRESS NOTES
Shae Pierre is a pleasant 59 year old year old female patient here today for spot on back. Present for a while, she notes recently getting larger. She notes it will rub on her bra, tender at times.  Patient has no other skin complaints today.  Remainder of the HPI, Meds, PMH, Allergies, FH, and SH was reviewed in chart.    Past Medical History:   Diagnosis Date    Arthritis 2000    Dysplasia of cervix (uteri) 1998    Fibroids 02/22/2012    Other abnormal Pap smear and cervical HPV (human papapillomavirus) 09/29/2008    EM>40 and pos HPV unknown type rec rpt pap 6 mos/11/08  1/26/2009 F/u pap was normal  Will have next pap in 1 year.     Other acne     PONV (postoperative nausea and vomiting)        Past Surgical History:   Procedure Laterality Date    BIOPSY      COLONOSCOPY N/A 04/16/2018    Procedure: COLONOSCOPY;  Colonoscopy;  Surgeon: Chuck Donovan MD;  Location: [facility]    EXCISE MASS TRUNK  07/13/2012    Procedure: EXCISE MASS TRUNK;  Excisional Removal Chest Wall Cysts X2;  Surgeon: Dennys Henriquez MD;  Location: [facility]    HYSTERECTOMY, PAP NO LONGER INDICATED      LAPAROSCOPIC ASSISTED HYSTERECTOMY VAGINAL, BILATERAL SALPINGO-OOPHORECTOMY, COMBINED  04/05/2012    LAVH-BSO    LAPAROSCOPIC CHOLECYSTECTOMY N/A 11/17/2021    Procedure: CHOLECYSTECTOMY, LAPAROSCOPIC;  Surgeon: Chip Santana MD;  Location: [facility]    ORTHOPEDIC SURGERY  4/2021, 2022    SURGICAL HISTORY OF -       LEEP    SURGICAL HISTORY OF -       Cryotherapy     SURGICAL HISTORY OF -       Colpo    SURGICAL HISTORY OF -  Left     meniscal injury    VASCULAR SURGERY  2012?        Family History   Problem Relation Age of Onset    Breast Cancer Maternal Grandmother     Hypertension Father     C.A.D. Father     Alzheimer Disease Father     Lipids Brother     Heart Disease Brother 58        MI    Diabetes Brother     Lipids Brother     Heart Disease Brother 60        MI    Hyperlipidemia Brother     Anesthesia Reaction Brother     Lipids  Brother     Prostate Cancer Brother     Osteoporosis Brother     Lipids Brother     Lipids Sister     Hyperlipidemia Sister     Lipids Sister     Circulatory Mother         AAA    Chronic Obstructive Pulmonary Disease Mother     Osteoporosis Mother     Asthma Daughter     Breast Cancer Maternal Aunt     No Known Problems Daughter        Social History     Socioeconomic History    Marital status:      Spouse name: Not on file    Number of children: Not on file    Years of education: Not on file    Highest education level: Not on file   Occupational History    Occupation:      Employer: [employer]     Comment: [employer]   Tobacco Use    Smoking status: Former     Current packs/day: 0.00     Types: Cigarettes     Quit date: 2010     Years since quittin.7     Passive exposure: Past    Smokeless tobacco: Never   Vaping Use    Vaping status: Never Used   Substance and Sexual Activity    Alcohol use: Yes     Comment: occ    Drug use: No    Sexual activity: Not Currently     Partners: Male   Other Topics Concern    Parent/sibling w/ CABG, MI or angioplasty before 65F 55M? Yes   Social History Narrative    Finance in healthcare     Social Drivers of Health     Financial Resource Strain: Low Risk  (2024)    Financial Resource Strain     Within the past 12 months, have you or your family members you live with been unable to get utilities (heat, electricity) when it was really needed?: No   Food Insecurity: Low Risk  (2024)    Food Insecurity     Within the past 12 months, did you worry that your food would run out before you got money to buy more?: No     Within the past 12 months, did the food you bought just not last and you didn t have money to get more?: No   Transportation Needs: Low Risk  (2024)    Transportation Needs     Within the past 12 months, has lack of transportation kept you from medical appointments, getting your medicines, non-medical  meetings or appointments, work, or from getting things that you need?: No   Physical Activity: Sufficiently Active (2/12/2024)    Exercise Vital Sign     Days of Exercise per Week: 5 days     Minutes of Exercise per Session: 60 min   Stress: No Stress Concern Present (2/12/2024)    [employer] - Occupational Stress Questionnaire     Feeling of Stress : Not at all   Social Connections: Unknown (2/12/2024)    Social Connection and Isolation Panel [NHANES]     Frequency of Communication with Friends and Family: Not on file     Frequency of Social Gatherings with Friends and Family: Twice a week     Attends Yarsani Services: Not on file     Active Member of Clubs or Organizations: Not on file     Attends Club or Organization Meetings: Not on file     Marital Status: Not on file   Interpersonal Safety: Low Risk  (2/15/2024)    Interpersonal Safety     Do you feel physically and emotionally safe where you currently live?: Yes     Within the past 12 months, have you been hit, slapped, kicked or otherwise physically hurt by someone?: No     Within the past 12 months, have you been humiliated or emotionally abused in other ways by your partner or ex-partner?: No   Housing Stability: Low Risk  (2/12/2024)    Housing Stability     Do you have housing? : Yes     Are you worried about losing your housing?: No       Outpatient Encounter Medications as of 12/17/2024   Medication Sig Dispense Refill    ADVIL 200 MG OR TABS TAKE 1 TO 2 TABLETS EVERY 4 TO 6 HOURS AS NEEDED WITH FOOD 120 0    augmented betamethasone dipropionate (DIPROLENE AF) 0.05 % external cream Apply sparingly to affected area twice daily as needed.  Do not apply to face. 100 g 3    clotrimazole (LOTRIMIN) 1 % external cream Apply topically 2 times daily 113 g 1    GLUCOSAMINE SULFATE PO Take 500 mg by mouth 2 times daily      latanoprost (XALATAN) 0.005 % ophthalmic solution Place 1 drop into each eye EVERY EVENING       MULTI-VITAMIN OR TABS 1 TABLET DAILY 30 0    nystatin (MYCOSTATIN) 668761 UNIT/GM external powder Apply topically 2 times daily 60 g 1    Omega-3 Fatty Acids (FISH OIL) 435 MG CAPS Take 1 capsule by mouth 2 times daily      SUMAtriptan (IMITREX) 100 MG tablet Take 1 tablet (100 mg) by mouth at onset of headache for migraine May repeat in 2 hours prn: max 2/day 12 tablet 4    timolol maleate (TIMOPTIC) 0.5 % ophthalmic solution Place 1 drop into both eyes daily       No facility-administered encounter medications on file as of 12/17/2024.             O:   NAD, WDWN, Alert & Oriented, Mood & Affect wnl, Vitals stable   Here today alone   LMP 04/05/2012    General appearance normal   Vitals stable   Alert, oriented and in no acute distress      Brown stuck on papules on back       Eyes: Conjunctivae/lids:Normal     ENT: Lips: normal    MSK:Normal    Pulm: Breathing Normal    Neuro/Psych: Orientation:Alert and Orientedx3 ; Mood/Affect:normal   A/P:  1. Seborrheic keratosis  It was a pleasure speaking to Shae Pierre today.  Since bothersome recommend cryo, she is leaving on road trip on Friday. Will wait until after she comes back to treat.   BENIGN LESIONS DISCUSSED WITH PATIENT:  I discussed the specifics of tumor, prognosis, and genetics of benign lesions.  I explained that treatment of these lesions would be purely cosmetic and not medically neccessary.  I discussed with patient different removal options including excision, cautery and /or laser.      Nature and genetics of benign skin lesions dicussed with patient.  Signs and Symptoms of skin cancer discussed with patient.  ABCDEs of melanoma reviewed with patient.  Patient encouraged to perform monthly skin exams.  UV precautions reviewed with patient.  Risks of non-melanoma skin cancer discussed with patient   Return to clinic in January.

## 2025-01-01 ENCOUNTER — APPOINTMENT (OUTPATIENT)
Dept: GENERAL RADIOLOGY | Facility: CLINIC | Age: 60
End: 2025-01-01
Attending: EMERGENCY MEDICINE
Payer: COMMERCIAL

## 2025-01-01 ENCOUNTER — HOSPITAL ENCOUNTER (EMERGENCY)
Facility: CLINIC | Age: 60
Discharge: HOME | End: 2025-01-01
Attending: EMERGENCY MEDICINE
Payer: COMMERCIAL

## 2025-01-01 VITALS
RESPIRATION RATE: 16 BRPM | OXYGEN SATURATION: 95 % | SYSTOLIC BLOOD PRESSURE: 130 MMHG | HEART RATE: 67 BPM | TEMPERATURE: 97.5 F | BODY MASS INDEX: 36.25 KG/M2 | DIASTOLIC BLOOD PRESSURE: 75 MMHG | WEIGHT: 192 LBS | HEIGHT: 61 IN

## 2025-01-01 DIAGNOSIS — R07.9 CHEST PAIN, UNSPECIFIED TYPE: ICD-10-CM

## 2025-01-01 LAB
ALBUMIN SERPL BCG-MCNC: 3.9 G/DL (ref 3.5–5.2)
ALP SERPL-CCNC: 119 U/L (ref 40–150)
ALT SERPL W P-5'-P-CCNC: 31 U/L (ref 0–50)
ANION GAP SERPL CALCULATED.3IONS-SCNC: 12 MMOL/L (ref 7–15)
AST SERPL W P-5'-P-CCNC: 26 U/L (ref 0–45)
BASOPHILS # BLD AUTO: 0.1 10E3/UL (ref 0–0.2)
BASOPHILS NFR BLD AUTO: 1 %
BILIRUB DIRECT SERPL-MCNC: <0.2 MG/DL (ref 0–0.3)
BILIRUB SERPL-MCNC: 0.2 MG/DL
BUN SERPL-MCNC: 17.5 MG/DL (ref 6–20)
CALCIUM SERPL-MCNC: 10.1 MG/DL (ref 8.8–10.4)
CHLORIDE SERPL-SCNC: 104 MMOL/L (ref 98–107)
CREAT SERPL-MCNC: 0.83 MG/DL (ref 0.51–0.95)
D DIMER PPP FEU-MCNC: 0.31 UG/ML FEU (ref 0–0.5)
EGFRCR SERPLBLD CKD-EPI 2021: 82 ML/MIN/1.73M2
EOSINOPHIL # BLD AUTO: 0.2 10E3/UL (ref 0–0.7)
EOSINOPHIL NFR BLD AUTO: 2 %
ERYTHROCYTE [DISTWIDTH] IN BLOOD BY AUTOMATED COUNT: 13.2 % (ref 10–15)
GLUCOSE SERPL-MCNC: 95 MG/DL (ref 70–99)
HCO3 SERPL-SCNC: 26 MMOL/L (ref 22–29)
HCT VFR BLD AUTO: 41.8 % (ref 35–47)
HGB BLD-MCNC: 14.1 G/DL (ref 11.7–15.7)
HOLD SPECIMEN: NORMAL
IMM GRANULOCYTES # BLD: 0 10E3/UL
IMM GRANULOCYTES NFR BLD: 0 %
LIPASE SERPL-CCNC: 77 U/L (ref 13–60)
LYMPHOCYTES # BLD AUTO: 4.3 10E3/UL (ref 0.8–5.3)
LYMPHOCYTES NFR BLD AUTO: 39 %
MCH RBC QN AUTO: 31.8 PG (ref 26.5–33)
MCHC RBC AUTO-ENTMCNC: 33.7 G/DL (ref 31.5–36.5)
MCV RBC AUTO: 94 FL (ref 78–100)
MONOCYTES # BLD AUTO: 1 10E3/UL (ref 0–1.3)
MONOCYTES NFR BLD AUTO: 9 %
NEUTROPHILS # BLD AUTO: 5.5 10E3/UL (ref 1.6–8.3)
NEUTROPHILS NFR BLD AUTO: 49 %
NRBC # BLD AUTO: 0 10E3/UL
NRBC BLD AUTO-RTO: 0 /100
PLATELET # BLD AUTO: 307 10E3/UL (ref 150–450)
POTASSIUM SERPL-SCNC: 4.5 MMOL/L (ref 3.4–5.3)
PROT SERPL-MCNC: 6.5 G/DL (ref 6.4–8.3)
RBC # BLD AUTO: 4.44 10E6/UL (ref 3.8–5.2)
SODIUM SERPL-SCNC: 142 MMOL/L (ref 135–145)
TROPONIN T SERPL HS-MCNC: <6 NG/L
TROPONIN T SERPL HS-MCNC: <6 NG/L
WBC # BLD AUTO: 11.2 10E3/UL (ref 4–11)

## 2025-01-01 PROCEDURE — 83690 ASSAY OF LIPASE: CPT | Performed by: EMERGENCY MEDICINE

## 2025-01-01 PROCEDURE — 93308 TTE F-UP OR LMTD: CPT | Performed by: EMERGENCY MEDICINE

## 2025-01-01 PROCEDURE — 93010 ELECTROCARDIOGRAM REPORT: CPT | Performed by: EMERGENCY MEDICINE

## 2025-01-01 PROCEDURE — 85041 AUTOMATED RBC COUNT: CPT | Performed by: EMERGENCY MEDICINE

## 2025-01-01 PROCEDURE — 85025 COMPLETE CBC W/AUTO DIFF WBC: CPT | Performed by: EMERGENCY MEDICINE

## 2025-01-01 PROCEDURE — 99285 EMERGENCY DEPT VISIT HI MDM: CPT | Mod: 25 | Performed by: EMERGENCY MEDICINE

## 2025-01-01 PROCEDURE — 85379 FIBRIN DEGRADATION QUANT: CPT | Performed by: EMERGENCY MEDICINE

## 2025-01-01 PROCEDURE — 93005 ELECTROCARDIOGRAM TRACING: CPT | Performed by: EMERGENCY MEDICINE

## 2025-01-01 PROCEDURE — 71046 X-RAY EXAM CHEST 2 VIEWS: CPT

## 2025-01-01 PROCEDURE — 36415 COLL VENOUS BLD VENIPUNCTURE: CPT | Performed by: EMERGENCY MEDICINE

## 2025-01-01 PROCEDURE — 99284 EMERGENCY DEPT VISIT MOD MDM: CPT | Mod: 25 | Performed by: EMERGENCY MEDICINE

## 2025-01-01 PROCEDURE — 82248 BILIRUBIN DIRECT: CPT | Performed by: EMERGENCY MEDICINE

## 2025-01-01 PROCEDURE — 80048 BASIC METABOLIC PNL TOTAL CA: CPT | Performed by: EMERGENCY MEDICINE

## 2025-01-01 PROCEDURE — 93308 TTE F-UP OR LMTD: CPT | Mod: 26 | Performed by: EMERGENCY MEDICINE

## 2025-01-01 PROCEDURE — 82435 ASSAY OF BLOOD CHLORIDE: CPT | Performed by: EMERGENCY MEDICINE

## 2025-01-01 PROCEDURE — 84484 ASSAY OF TROPONIN QUANT: CPT | Performed by: EMERGENCY MEDICINE

## 2025-01-01 ASSESSMENT — COLUMBIA-SUICIDE SEVERITY RATING SCALE - C-SSRS
2. HAVE YOU ACTUALLY HAD ANY THOUGHTS OF KILLING YOURSELF IN THE PAST MONTH?: NO
6. HAVE YOU EVER DONE ANYTHING, STARTED TO DO ANYTHING, OR PREPARED TO DO ANYTHING TO END YOUR LIFE?: NO
1. IN THE PAST MONTH, HAVE YOU WISHED YOU WERE DEAD OR WISHED YOU COULD GO TO SLEEP AND NOT WAKE UP?: NO

## 2025-01-01 ASSESSMENT — ACTIVITIES OF DAILY LIVING (ADL)
ADLS_ACUITY_SCORE: 41

## 2025-01-01 NOTE — ED TRIAGE NOTES
Pt here with Chest pain that started an hour ago, states it feels like pressure and stabbing in the chest, started in the R arm then moved in to the chest.      Triage Assessment (Adult)       Row Name 01/01/25 1700          Triage Assessment    Airway WDL WDL        Respiratory WDL    Respiratory WDL X        Skin Circulation/Temperature WDL    Skin Circulation/Temperature WDL WDL        Cardiac WDL    Cardiac WDL X;chest pain        Chest Pain Assessment    Character sharp        Peripheral/Neurovascular WDL    Peripheral Neurovascular WDL WDL        Cognitive/Neuro/Behavioral WDL    Cognitive/Neuro/Behavioral WDL WDL

## 2025-01-02 LAB
ATRIAL RATE - MUSE: 82 BPM
DIASTOLIC BLOOD PRESSURE - MUSE: NORMAL MMHG
INTERPRETATION ECG - MUSE: NORMAL
P AXIS - MUSE: 48 DEGREES
PR INTERVAL - MUSE: 150 MS
QRS DURATION - MUSE: 68 MS
QT - MUSE: 396 MS
QTC - MUSE: 460 MS
R AXIS - MUSE: 5 DEGREES
SYSTOLIC BLOOD PRESSURE - MUSE: NORMAL MMHG
T AXIS - MUSE: 66 DEGREES
VENTRICULAR RATE- MUSE: 81 BPM

## 2025-01-02 NOTE — DISCHARGE INSTRUCTIONS
Your evaluation emergency room was reassuring however no cause of your symptoms identified.  Report any follow-up your primary care provider for further evaluation.    If your symptoms worsen or you develop new or concerning symptoms, please return to the Emergency Department for further evaluation and treatment.

## 2025-01-02 NOTE — ED PROVIDER NOTES
History     Chief Complaint   Patient presents with    Chest Pain     HPI  Shae Pierre is a 59 year old female with history of family history of heart disease (2 brothers and father with MI at 55 years or younger) who presents for evaluation of chest pain.  Pain occurred approximately an hour prior to arrival.  Said that initially she had tingling in her right hand and then a sharp pain in her chest followed by a pressure.  Feels as if someone is sitting on her chest.  Also reports shortness of breath which is improved some and a pain in the right side of her jaw or ear she is not sure.  No obvious provocative or palliating features.  Has never had this before.  No fever or cough.  No URI symptoms.  Non-smoker.  No abdominal pain, nausea vomiting or diarrhea.  Was feeling well earlier today.  Symptoms started at rest. No hx of VTE. No pain/swelling in lower extremities.     The patient's PMHx, Surgical Hx, Allergies, and Medications were all reviewed with the patient.    Allergies:  Allergies   Allergen Reactions    Morphine Other (See Comments) and Difficulty breathing     Throat closes    Morphine And Codeine Other (See Comments) and Difficulty breathing     Throat closes    Sulfa Antibiotics Hives    Staples Other (See Comments) and Rash     Rash and infection after knee surgery       Problem List:    Patient Active Problem List    Diagnosis Date Noted    Vaginal discharge 07/15/2024     Priority: Medium    Foul smelling urine 07/15/2024     Priority: Medium    Class 2 severe obesity with serious comorbidity and body mass index (BMI) of 36.0 to 36.9 in adult, unspecified obesity type (H) 01/10/2024     Priority: Medium    H/O total hysterectomy 06/30/2021     Priority: Medium     Benign pathology      Chronic pain of left knee 04/09/2021     Priority: Medium    Urge incontinence of urine 12/03/2015     Priority: Medium    Breast cyst 06/15/2012     Priority: Medium    Migraine headache 01/23/2012      Priority: Medium    CARDIOVASCULAR SCREENING; LDL GOAL LESS THAN 160 10/31/2010     Priority: Medium    BMI 34.0-34.9,adult 03/27/2006     Priority: Medium    Elevated blood pressure reading without diagnosis of hypertension 11/03/2005     Priority: Medium    Dermatophytosis of nail 11/03/2005     Priority: Medium        Past Medical History:    Past Medical History:   Diagnosis Date    Arthritis 2000    Dysplasia of cervix (uteri) 1998    Fibroids 02/22/2012    Other abnormal Pap smear and cervical HPV (human papapillomavirus) 09/29/2008    Other acne     PONV (postoperative nausea and vomiting)        Past Surgical History:    Past Surgical History:   Procedure Laterality Date    BIOPSY      COLONOSCOPY N/A 04/16/2018    Procedure: COLONOSCOPY;  Colonoscopy;  Surgeon: Chuck Donovan MD;  Location: [facility]    EXCISE MASS TRUNK  07/13/2012    Procedure: EXCISE MASS TRUNK;  Excisional Removal Chest Wall Cysts X2;  Surgeon: Dennys Henriquez MD;  Location: [facility]    HYSTERECTOMY, PAP NO LONGER INDICATED      LAPAROSCOPIC ASSISTED HYSTERECTOMY VAGINAL, BILATERAL SALPINGO-OOPHORECTOMY, COMBINED  04/05/2012    LAVH-BSO    LAPAROSCOPIC CHOLECYSTECTOMY N/A 11/17/2021    Procedure: CHOLECYSTECTOMY, LAPAROSCOPIC;  Surgeon: Chip Santana MD;  Location: [facility]    ORTHOPEDIC SURGERY  4/2021, 2022    SURGICAL HISTORY OF -       LEEP    SURGICAL HISTORY OF -       Cryotherapy     SURGICAL HISTORY OF -       Colpo    SURGICAL HISTORY OF -  Left     meniscal injury    VASCULAR SURGERY  2012?       Family History:    Family History   Problem Relation Age of Onset    Breast Cancer Maternal Grandmother     Hypertension Father     C.A.D. Father     Alzheimer Disease Father     Lipids Brother     Heart Disease Brother 58        MI    Diabetes Brother     Lipids Brother     Heart Disease Brother 60        MI    Hyperlipidemia Brother     Anesthesia Reaction Brother     Lipids Brother     Prostate Cancer Brother     Osteoporosis  "Brother     Lipids Brother     Lipids Sister     Hyperlipidemia Sister     Lipids Sister     Circulatory Mother         AAA    Chronic Obstructive Pulmonary Disease Mother     Osteoporosis Mother     Asthma Daughter     Breast Cancer Maternal Aunt     No Known Problems Daughter        Social History:  Marital Status:   [4]  Social History     Tobacco Use    Smoking status: Former     Current packs/day: 0.00     Types: Cigarettes     Quit date: 2010     Years since quittin.7     Passive exposure: Past    Smokeless tobacco: Never   Vaping Use    Vaping status: Never Used   Substance Use Topics    Alcohol use: Yes     Comment: occ    Drug use: No        Medications:    ADVIL 200 MG OR TABS  augmented betamethasone dipropionate (DIPROLENE AF) 0.05 % external cream  clotrimazole (LOTRIMIN) 1 % external cream  GLUCOSAMINE SULFATE PO  latanoprost (XALATAN) 0.005 % ophthalmic solution  MULTI-VITAMIN OR TABS  nystatin (MYCOSTATIN) 604546 UNIT/GM external powder  Omega-3 Fatty Acids (FISH OIL) 435 MG CAPS  SUMAtriptan (IMITREX) 100 MG tablet  timolol maleate (TIMOPTIC) 0.5 % ophthalmic solution          Review of Systems  Pertinent positives and negatives mentioned in HPI    Physical Exam   BP: (!) 152/95  Pulse: 78  Temp: 97.5  F (36.4  C)  Resp: 22  Height: 154.9 cm (5' 1\")  Weight: 87.1 kg (192 lb)  SpO2: 99 %    GEN: Awake, alert, and cooperative.  Appears mildly anxious but nontoxic.  NECK: Symmetric, freely mobile.   CV : Regular rate and rhythm.  No murmurs appreciated  PULM: Normal effort. No wheezes, rales, or rhonchi bilaterally.  ABD: Soft, non-tender, non-distended. No rebound or guarding.   NEURO: Normal speech. Following commands. Answering questions and interacting appropriately.   EXT: No gross deformity. Warm and well perfused.  No pain or swelling in legs.  INT: Warm. No diaphoresis. Normal color.        ED Course        Procedures  POCUS is ordered, but not resulted ***     EKG: Interpreted " by Ramana Lentz MD sinus rhythm with rate of 81 bpm.  Delayed R wave progression.  No T-segment elevations or depressions.  No abnormal T wave inversions.  No pathologic Q waves.  No significant change compared to 11/17/2021.  Impression sinus rhythm with no evidence of acute ischemia.    Critical Care time:  {none or minutes:264773}              Results for orders placed or performed during the hospital encounter of 01/01/25 (from the past 24 hours)   EKG 12 lead   Result Value Ref Range    Systolic Blood Pressure  mmHg    Diastolic Blood Pressure  mmHg    Ventricular Rate 81 BPM    Atrial Rate 82 BPM    OH Interval 150 ms    QRS Duration 68 ms     ms    QTc 460 ms    P Axis 48 degrees    R AXIS 5 degrees    T Axis 66 degrees    Interpretation ECG       Sinus rhythm  Low voltage QRS  Borderline ECG  No previous ECGs available     CBC with platelets, differential    Narrative    The following orders were created for panel order CBC with platelets, differential.  Procedure                               Abnormality         Status                     ---------                               -----------         ------                     CBC with platelets and d...[[id number]]  Abnormal            Final result                 Please view results for these tests on the individual orders.   Basic metabolic panel   Result Value Ref Range    Sodium 142 135 - 145 mmol/L    Potassium 4.5 3.4 - 5.3 mmol/L    Chloride 104 98 - 107 mmol/L    Carbon Dioxide (CO2) 26 22 - 29 mmol/L    Anion Gap 12 7 - 15 mmol/L    Urea Nitrogen 17.5 6.0 - 20.0 mg/dL    Creatinine 0.83 0.51 - 0.95 mg/dL    GFR Estimate 82 >60 mL/min/1.73m2    Calcium 10.1 8.8 - 10.4 mg/dL    Glucose 95 70 - 99 mg/dL   Troponin T, High Sensitivity   Result Value Ref Range    Troponin T, High Sensitivity <6 <=14 ng/L   CBC with platelets and differential   Result Value Ref Range    WBC Count 11.2 (H) 4.0 - 11.0 10e3/uL    RBC Count 4.44 3.80 - 5.20 10e6/uL     Hemoglobin 14.1 11.7 - 15.7 g/dL    Hematocrit 41.8 35.0 - 47.0 %    MCV 94 78 - 100 fL    MCH 31.8 26.5 - 33.0 pg    MCHC 33.7 31.5 - 36.5 g/dL    RDW 13.2 10.0 - 15.0 %    Platelet Count 307 150 - 450 10e3/uL    % Neutrophils 49 %    % Lymphocytes 39 %    % Monocytes 9 %    % Eosinophils 2 %    % Basophils 1 %    % Immature Granulocytes 0 %    NRBCs per 100 WBC 0 <1 /100    Absolute Neutrophils 5.5 1.6 - 8.3 10e3/uL    Absolute Lymphocytes 4.3 0.8 - 5.3 10e3/uL    Absolute Monocytes 1.0 0.0 - 1.3 10e3/uL    Absolute Eosinophils 0.2 0.0 - 0.7 10e3/uL    Absolute Basophils 0.1 0.0 - 0.2 10e3/uL    Absolute Immature Granulocytes 0.0 <=0.4 10e3/uL    Absolute NRBCs 0.0 10e3/uL   [place] Draw    Narrative    The following orders were created for panel order [place] Draw.  Procedure                               Abnormality         Status                     ---------                               -----------         ------                     Extra Blue Top Tube[[id number]]                              Final result                 Please view results for these tests on the individual orders.   Extra Blue Top Tube   Result Value Ref Range    Hold Specimen [facility]    D dimer quantitative   Result Value Ref Range    D-Dimer Quantitative 0.31 0.00 - 0.50 ug/mL FEU    Narrative    This D-dimer assay is intended for use in conjunction with a clinical pretest probability assessment model to exclude pulmonary embolism (PE) and deep venous thrombosis (DVT) in outpatients suspected of PE or DVT. The cut-off value is 0.50 ug/mL FEU.    For patients 50 years of age or older, the application of age-adjusted cut-off values for D-Dimer may increase the specificity without significant effect on sensitivity. The literature suggested calculation age adjusted cut-off in ug/L = age in years x 10 ug/L. The results in this laboratory are reported as ug/mL rather than ug/L. The calculation for age adjusted cut off in ug/mL= age in years x 0.01  ug/mL. For example, the cut off for a 76 year old male is 76 x 0.01 ug/mL = 0.76 ug/mL (760 ug/L).    M Heydi et al. Age adjusted D-dimer cut-off levels to rule out pulmonary embolism: The ADJUST-PE Study. ABBY 2014;311:2641-6783.; HJ Ted et al. Diagnostic accuracy of conventional or age adjusted D-dimer cutoff values in older patients with suspected venous thromboembolism. Systemic review and meta-analysis. BMJ 2013:346:f2492.   Troponin T, High Sensitivity   Result Value Ref Range    Troponin T, High Sensitivity <6 <=14 ng/L   Lipase   Result Value Ref Range    Lipase 77 (H) 13 - 60 U/L   Hepatic panel   Result Value Ref Range    Protein Total 6.5 6.4 - 8.3 g/dL    Albumin 3.9 3.5 - 5.2 g/dL    Bilirubin Total 0.2 <=1.2 mg/dL    Alkaline Phosphatase 119 40 - 150 U/L    AST 26 0 - 45 U/L    ALT 31 0 - 50 U/L    Bilirubin Direct <0.20 0.00 - 0.30 mg/dL   Chest XR,  PA & LAT    Narrative    EXAM: XR CHEST 2 VIEWS  LOCATION: [facility]  DATE: 1/1/2025    INDICATION: central chest pressure. no fever or cough. tachypnea but no tachycardia or hypoxia  COMPARISON: 11/17/2021      Impression    IMPRESSION:     No focal airspace disease. No pleural effusion or pneumothorax.    The cardiomediastinal silhouette is unremarkable.    Multilevel degenerative changes of the spine.       Medications - No data to display    Assessments & Plan (with Medical Decision Making)   59 year old female with family history of coronary artery disease but no personal history with acute onset chest tightness detailed above.  ECG without evidence of acute ischemia.  Initial troponin below level of detection.  Story is slightly concerning with nonspecific symptoms in right arm and right jaw.  Lungs are clear.  Is tachypneic.  No murmurs appreciated.  BMP normal.  Feel reassured that there is no metabolic acidosis causing tachypnea.  She has not tachycardic.  Dimer is not elevated no clinical signs of DVT.  CBC  grossly normal, with WBC modestly elevated 11.2 but no left shift and otherwise normal.    2-hour delta troponin unchanged.  Chest x-ray w/out effusion, ptx, or infiltrate by my interpretation. No change when compared to prior. No widening of mediastiunm. POCUS unremarkable.          I have reviewed the nursing notes.         Discharge Medication List as of 1/1/2025 10:43 PM          Final diagnoses:   Chest pain, unspecified type     Ramana Lentz MD        1/1/2025   [facility] EMERGENCY DEPT    Disclaimer: This note consists of words and symbols derived from keyboarding and dictation using voice recognition software.  As a result, there may be errors that have gone undetected.  Please consider this when interpreting information found in this note.

## 2025-01-03 ENCOUNTER — OFFICE VISIT (OUTPATIENT)
Dept: DERMATOLOGY | Facility: CLINIC | Age: 60
End: 2025-01-03
Payer: COMMERCIAL

## 2025-01-03 DIAGNOSIS — D18.01 ANGIOMA OF SKIN: ICD-10-CM

## 2025-01-03 DIAGNOSIS — L81.4 LENTIGO: ICD-10-CM

## 2025-01-03 DIAGNOSIS — D23.9 DERMAL NEVUS: ICD-10-CM

## 2025-01-03 DIAGNOSIS — L82.0 INFLAMED SEBORRHEIC KERATOSIS: ICD-10-CM

## 2025-01-03 DIAGNOSIS — L82.1 SEBORRHEIC KERATOSIS: Primary | ICD-10-CM

## 2025-01-03 DIAGNOSIS — D22.9 NEVUS: ICD-10-CM

## 2025-01-03 PROCEDURE — 99213 OFFICE O/P EST LOW 20 MIN: CPT | Mod: 25 | Performed by: PHYSICIAN ASSISTANT

## 2025-01-03 PROCEDURE — 17110 DESTRUCTION B9 LES UP TO 14: CPT | Performed by: PHYSICIAN ASSISTANT

## 2025-01-03 ASSESSMENT — PAIN SCALES - GENERAL: PAINLEVEL_OUTOF10: NO PAIN (0)

## 2025-01-03 NOTE — LETTER
1/3/2025      Shae Pierre  [address] [address] [place] [id number]      Dear Colleague,    Thank you for referring your patient, Shae Pierre, to the [facility]. Please see a copy of my visit note below.    Shae Pierre is a pleasant 59 year old year old female patient here today for seborrheic keratoses on face and body.  Patient has no other skin complaints today.  Remainder of the HPI, Meds, PMH, Allergies, FH, and SH was reviewed in chart.    Pertinent Hx:   No personal history of skin cancer  Past Medical History:   Diagnosis Date     Arthritis 2000     Dysplasia of cervix (uteri) 1998     Fibroids 02/22/2012     Other abnormal Pap smear and cervical HPV (human papapillomavirus) 09/29/2008    EM>40 and pos HPV unknown type rec rpt pap 6 mos/11/08  1/26/2009 F/u pap was normal  Will have next pap in 1 year.      Other acne      PONV (postoperative nausea and vomiting)        Past Surgical History:   Procedure Laterality Date     BIOPSY       COLONOSCOPY N/A 04/16/2018    Procedure: COLONOSCOPY;  Colonoscopy;  Surgeon: Chuck Donovan MD;  Location: [facility]     EXCISE MASS TRUNK  07/13/2012    Procedure: EXCISE MASS TRUNK;  Excisional Removal Chest Wall Cysts X2;  Surgeon: Dennys Henriquez MD;  Location: [facility]     HYSTERECTOMY, PAP NO LONGER INDICATED       LAPAROSCOPIC ASSISTED HYSTERECTOMY VAGINAL, BILATERAL SALPINGO-OOPHORECTOMY, COMBINED  04/05/2012    LAVH-BSO     LAPAROSCOPIC CHOLECYSTECTOMY N/A 11/17/2021    Procedure: CHOLECYSTECTOMY, LAPAROSCOPIC;  Surgeon: Chip Santana MD;  Location: [facility]     ORTHOPEDIC SURGERY  4/2021, 2022     SURGICAL HISTORY OF -       LEEP     SURGICAL HISTORY OF -       Cryotherapy      SURGICAL HISTORY OF -       Colpo     SURGICAL HISTORY OF -  Left     meniscal injury     VASCULAR SURGERY  2012?        Family History   Problem Relation Age of Onset     Breast Cancer Maternal Grandmother      Hypertension Father      C.A.D. Father       Alzheimer Disease Father      Lipids Brother      Heart Disease Brother 58        MI     Diabetes Brother      Lipids Brother      Heart Disease Brother 60        MI     Hyperlipidemia Brother      Anesthesia Reaction Brother      Lipids Brother      Prostate Cancer Brother      Osteoporosis Brother      Lipids Brother      Lipids Sister      Hyperlipidemia Sister      Lipids Sister      Circulatory Mother         AAA     Chronic Obstructive Pulmonary Disease Mother      Osteoporosis Mother      Asthma Daughter      Breast Cancer Maternal Aunt      No Known Problems Daughter        Social History     Socioeconomic History     Marital status:      Spouse name: Not on file     Number of children: Not on file     Years of education: Not on file     Highest education level: Not on file   Occupational History     Occupation:      Employer: [employer]     Comment: [employer]   Tobacco Use     Smoking status: Former     Current packs/day: 0.00     Types: Cigarettes     Quit date: 2010     Years since quittin.7     Passive exposure: Past     Smokeless tobacco: Never   Vaping Use     Vaping status: Never Used   Substance and Sexual Activity     Alcohol use: Yes     Comment: occ     Drug use: No     Sexual activity: Not Currently     Partners: Male   Other Topics Concern     Parent/sibling w/ CABG, MI or angioplasty before 65F 55M? Yes   Social History Narrative    Finance in healthcare     Social Drivers of Health     Financial Resource Strain: Low Risk  (2024)    Financial Resource Strain      Within the past 12 months, have you or your family members you live with been unable to get utilities (heat, electricity) when it was really needed?: No   Food Insecurity: Low Risk  (2024)    Food Insecurity      Within the past 12 months, did you worry that your food would run out before you got money to buy more?: No      Within the past 12 months, did the food you  bought just not last and you didn t have money to get more?: No   Transportation Needs: Low Risk  (2/12/2024)    Transportation Needs      Within the past 12 months, has lack of transportation kept you from medical appointments, getting your medicines, non-medical meetings or appointments, work, or from getting things that you need?: No   Physical Activity: Sufficiently Active (2/12/2024)    Exercise Vital Sign      Days of Exercise per Week: 5 days      Minutes of Exercise per Session: 60 min   Stress: No Stress Concern Present (2/12/2024)    [employer] [place] [employer] - Occupational Stress Questionnaire      Feeling of Stress : Not at all   Social Connections: Unknown (2/12/2024)    Social Connection and Isolation Panel [NHANES]      Frequency of Communication with Friends and Family: Not on file      Frequency of Social Gatherings with Friends and Family: Twice a week      Attends [employer] Services: Not on file      Active Member of Clubs or Organizations: Not on file      Attends Club or Organization Meetings: Not on file      Marital Status: Not on file   Interpersonal Safety: Low Risk  (2/15/2024)    Interpersonal Safety      Do you feel physically and emotionally safe where you currently live?: Yes      Within the past 12 months, have you been hit, slapped, kicked or otherwise physically hurt by someone?: No      Within the past 12 months, have you been humiliated or emotionally abused in other ways by your partner or ex-partner?: No   Housing Stability: Low Risk  (2/12/2024)    Housing Stability      Do you have housing? : Yes      Are you worried about losing your housing?: No       Outpatient Encounter Medications as of 1/3/2025   Medication Sig Dispense Refill     ADVIL 200 MG OR TABS TAKE 1 TO 2 TABLETS EVERY 4 TO 6 HOURS AS NEEDED WITH FOOD 120 0     augmented betamethasone dipropionate (DIPROLENE AF) 0.05 % external cream Apply sparingly to affected area twice daily as needed.  Do not  apply to face. 100 g 3     clotrimazole (LOTRIMIN) 1 % external cream Apply topically 2 times daily 113 g 1     GLUCOSAMINE SULFATE PO Take 500 mg by mouth 2 times daily       latanoprost (XALATAN) 0.005 % ophthalmic solution Place 1 drop into each eye EVERY EVENING       MULTI-VITAMIN OR TABS 1 TABLET DAILY 30 0     nystatin (MYCOSTATIN) 408329 UNIT/GM external powder Apply topically 2 times daily 60 g 1     Omega-3 Fatty Acids (FISH OIL) 435 MG CAPS Take 1 capsule by mouth 2 times daily       SUMAtriptan (IMITREX) 100 MG tablet Take 1 tablet (100 mg) by mouth at onset of headache for migraine May repeat in 2 hours prn: max 2/day 12 tablet 4     timolol maleate (TIMOPTIC) 0.5 % ophthalmic solution Place 1 drop into both eyes daily       No facility-administered encounter medications on file as of 1/3/2025.             O:   NAD, WDWN, Alert & Oriented, Mood & Affect wnl, Vitals stable   Here today alone   LMP 04/05/2012    General appearance normal   Vitals stable   Alert, oriented and in no acute distress    Stuck on papules and brown macules on trunk and ext   Red papules on trunk  Brown papules and macules with regular pigment network and borders on torso and extremities     The remainder of skin exam is normal       Eyes: Conjunctivae/lids:Normal     ENT: Lips: normal    MSK:Normal    Cardiovascular: peripheral edema none    Pulm: Breathing Normal    Neuro/Psych: Orientation:Alert and Orientedx3 ; Mood/Affect:normal     A/P:  Inflamed seborrheic keratosis on shoulder, chest, mid back and hip, right upper arm x 14  LN2:  Treated with LN2 for 5s for 1-2 cycles. Warned risks of blistering, pain, pigment change, scarring, and incomplete resolution.  Advised patient to return if lesions do not completely resolve.  Wound care sheet given.  2.  Seborrheic keratosis, lentigo, angioma, benign nevi   It was a pleasure speaking to Shae Pierre today.  BENIGN LESIONS DISCUSSED WITH PATIENT:  I discussed the specifics of  tumor, prognosis, and genetics of benign lesions.  I explained that treatment of these lesions would be purely cosmetic and not medically neccessary.  I discussed with patient different removal options including excision, cautery and /or laser.      Nature and genetics of benign skin lesions dicussed with patient.  Signs and Symptoms of skin cancer discussed with patient.  ABCDEs of melanoma reviewed with patient.  Patient encouraged to perform monthly skin exams.  UV precautions reviewed with patient.  Risks of non-melanoma skin cancer discussed with patient   Return to clinic in one year or sooner if needed.       Again, thank you for allowing me to participate in the care of your patient.        Sincerely,        Nrimala Gonzalez PA-C    Electronically signed

## 2025-01-03 NOTE — PROGRESS NOTES
Shae Pierre is a pleasant 59 year old year old female patient here today for seborrheic keratoses on face and body.  Patient has no other skin complaints today.  Remainder of the HPI, Meds, PMH, Allergies, FH, and SH was reviewed in chart.    Pertinent Hx:   No personal history of skin cancer  Past Medical History:   Diagnosis Date    Arthritis 2000    Dysplasia of cervix (uteri) 1998    Fibroids 02/22/2012    Other abnormal Pap smear and cervical HPV (human papapillomavirus) 09/29/2008    EM>40 and pos HPV unknown type rec rpt pap 6 mos/11/08  1/26/2009 F/u pap was normal  Will have next pap in 1 year.     Other acne     PONV (postoperative nausea and vomiting)        Past Surgical History:   Procedure Laterality Date    BIOPSY      COLONOSCOPY N/A 04/16/2018    Procedure: COLONOSCOPY;  Colonoscopy;  Surgeon: Chuck Donovan MD;  Location: [facility]    EXCISE MASS TRUNK  07/13/2012    Procedure: EXCISE MASS TRUNK;  Excisional Removal Chest Wall Cysts X2;  Surgeon: Dennys Henriquez MD;  Location: [facility]    HYSTERECTOMY, PAP NO LONGER INDICATED      LAPAROSCOPIC ASSISTED HYSTERECTOMY VAGINAL, BILATERAL SALPINGO-OOPHORECTOMY, COMBINED  04/05/2012    LAVH-BSO    LAPAROSCOPIC CHOLECYSTECTOMY N/A 11/17/2021    Procedure: CHOLECYSTECTOMY, LAPAROSCOPIC;  Surgeon: Chip Santana MD;  Location: [facility]    ORTHOPEDIC SURGERY  4/2021, 2022    SURGICAL HISTORY OF -       LEEP    SURGICAL HISTORY OF -       Cryotherapy     SURGICAL HISTORY OF -       Colpo    SURGICAL HISTORY OF -  Left     meniscal injury    VASCULAR SURGERY  2012?        Family History   Problem Relation Age of Onset    Breast Cancer Maternal Grandmother     Hypertension Father     C.A.D. Father     Alzheimer Disease Father     Lipids Brother     Heart Disease Brother 58        MI    Diabetes Brother     Lipids Brother     Heart Disease Brother 60        MI    Hyperlipidemia Brother     Anesthesia Reaction Brother     Lipids Brother     Prostate Cancer  Brother     Osteoporosis Brother     Lipids Brother     Lipids Sister     Hyperlipidemia Sister     Lipids Sister     Circulatory Mother         AAA    Chronic Obstructive Pulmonary Disease Mother     Osteoporosis Mother     Asthma Daughter     Breast Cancer Maternal Aunt     No Known Problems Daughter        Social History     Socioeconomic History    Marital status:      Spouse name: Not on file    Number of children: Not on file    Years of education: Not on file    Highest education level: Not on file   Occupational History    Occupation:      Employer: [employer]     Comment: [employer]   Tobacco Use    Smoking status: Former     Current packs/day: 0.00     Types: Cigarettes     Quit date: 2010     Years since quittin.7     Passive exposure: Past    Smokeless tobacco: Never   Vaping Use    Vaping status: Never Used   Substance and Sexual Activity    Alcohol use: Yes     Comment: occ    Drug use: No    Sexual activity: Not Currently     Partners: Male   Other Topics Concern    Parent/sibling w/ CABG, MI or angioplasty before 65F 55M? Yes   Social History Narrative    Finance in healthcare     Social Drivers of Health     Financial Resource Strain: Low Risk  (2024)    Financial Resource Strain     Within the past 12 months, have you or your family members you live with been unable to get utilities (heat, electricity) when it was really needed?: No   Food Insecurity: Low Risk  (2024)    Food Insecurity     Within the past 12 months, did you worry that your food would run out before you got money to buy more?: No     Within the past 12 months, did the food you bought just not last and you didn t have money to get more?: No   Transportation Needs: Low Risk  (2024)    Transportation Needs     Within the past 12 months, has lack of transportation kept you from medical appointments, getting your medicines, non-medical meetings or appointments,  work, or from getting things that you need?: No   Physical Activity: Sufficiently Active (2/12/2024)    Exercise Vital Sign     Days of Exercise per Week: 5 days     Minutes of Exercise per Session: 60 min   Stress: No Stress Concern Present (2/12/2024)    [employer] [place] [employer] - Occupational Stress Questionnaire     Feeling of Stress : Not at all   Social Connections: Unknown (2/12/2024)    Social Connection and Isolation Panel [NHANES]     Frequency of Communication with Friends and Family: Not on file     Frequency of Social Gatherings with Friends and Family: Twice a week     Attends Jehovah[employer] witness Services: Not on file     Active Member of Clubs or Organizations: Not on file     Attends Club or Organization Meetings: Not on file     Marital Status: Not on file   Interpersonal Safety: Low Risk  (2/15/2024)    Interpersonal Safety     Do you feel physically and emotionally safe where you currently live?: Yes     Within the past 12 months, have you been hit, slapped, kicked or otherwise physically hurt by someone?: No     Within the past 12 months, have you been humiliated or emotionally abused in other ways by your partner or ex-partner?: No   Housing Stability: Low Risk  (2/12/2024)    Housing Stability     Do you have housing? : Yes     Are you worried about losing your housing?: No       Outpatient Encounter Medications as of 1/3/2025   Medication Sig Dispense Refill    ADVIL 200 MG OR TABS TAKE 1 TO 2 TABLETS EVERY 4 TO 6 HOURS AS NEEDED WITH FOOD 120 0    augmented betamethasone dipropionate (DIPROLENE AF) 0.05 % external cream Apply sparingly to affected area twice daily as needed.  Do not apply to face. 100 g 3    clotrimazole (LOTRIMIN) 1 % external cream Apply topically 2 times daily 113 g 1    GLUCOSAMINE SULFATE PO Take 500 mg by mouth 2 times daily      latanoprost (XALATAN) 0.005 % ophthalmic solution Place 1 drop into each eye EVERY EVENING      MULTI-VITAMIN OR TABS 1 TABLET DAILY 30  0    nystatin (MYCOSTATIN) 479240 UNIT/GM external powder Apply topically 2 times daily 60 g 1    Omega-3 Fatty Acids (FISH OIL) 435 MG CAPS Take 1 capsule by mouth 2 times daily      SUMAtriptan (IMITREX) 100 MG tablet Take 1 tablet (100 mg) by mouth at onset of headache for migraine May repeat in 2 hours prn: max 2/day 12 tablet 4    timolol maleate (TIMOPTIC) 0.5 % ophthalmic solution Place 1 drop into both eyes daily       No facility-administered encounter medications on file as of 1/3/2025.             O:   NAD, WDWN, Alert & Oriented, Mood & Affect wnl, Vitals stable   Here today alone   LMP 04/05/2012    General appearance normal   Vitals stable   Alert, oriented and in no acute distress    Stuck on papules and brown macules on trunk and ext   Red papules on trunk  Brown papules and macules with regular pigment network and borders on torso and extremities     The remainder of skin exam is normal       Eyes: Conjunctivae/lids:Normal     ENT: Lips: normal    MSK:Normal    Cardiovascular: peripheral edema none    Pulm: Breathing Normal    Neuro/Psych: Orientation:Alert and Orientedx3 ; Mood/Affect:normal     A/P:  Inflamed seborrheic keratosis on shoulder, chest, mid back and hip, right upper arm x 14  LN2:  Treated with LN2 for 5s for 1-2 cycles. Warned risks of blistering, pain, pigment change, scarring, and incomplete resolution.  Advised patient to return if lesions do not completely resolve.  Wound care sheet given.  2.  Seborrheic keratosis, lentigo, angioma, benign nevi   It was a pleasure speaking to Shae Pierre today.  BENIGN LESIONS DISCUSSED WITH PATIENT:  I discussed the specifics of tumor, prognosis, and genetics of benign lesions.  I explained that treatment of these lesions would be purely cosmetic and not medically neccessary.  I discussed with patient different removal options including excision, cautery and /or laser.      Nature and genetics of benign skin lesions dicussed with  patient.  Signs and Symptoms of skin cancer discussed with patient.  ABCDEs of melanoma reviewed with patient.  Patient encouraged to perform monthly skin exams.  UV precautions reviewed with patient.  Risks of non-melanoma skin cancer discussed with patient   Return to clinic in one year or sooner if needed.

## 2025-01-16 ENCOUNTER — PATIENT OUTREACH (OUTPATIENT)
Dept: CARE COORDINATION | Facility: CLINIC | Age: 60
End: 2025-01-16
Payer: COMMERCIAL

## 2025-01-27 ENCOUNTER — TELEPHONE (OUTPATIENT)
Dept: FAMILY MEDICINE | Facility: CLINIC | Age: 60
End: 2025-01-27
Payer: COMMERCIAL

## 2025-01-27 NOTE — TELEPHONE ENCOUNTER
FYI - Status Update    Who is Calling: patient    Update: pt has an appt for a follow up from her hospital visit on Wednesday 1/29 and her preventative on 1/31 and was wondering if the 2 appointments can be made into one appointment. Clinic will have to call pt to give her an update on whether it can be done together or separately.    Does caller want a call/response back: Yes     Could we send this information to you in [facility] or would you prefer to receive a phone call?:   Patient would prefer a phone call   Okay to leave a detailed message?: Yes at Cell number on file:    Telephone Information:   Mobile [phone]

## 2025-01-27 NOTE — TELEPHONE ENCOUNTER
Per assistant, keep annual visit and can discuss hosp follow-up at that time.     Juanito Barrett, ANEUDY Lindquist

## 2025-02-03 ENCOUNTER — MYC MEDICAL ADVICE (OUTPATIENT)
Dept: FAMILY MEDICINE | Facility: CLINIC | Age: 60
End: 2025-02-03
Payer: COMMERCIAL

## 2025-02-03 DIAGNOSIS — E67.3 HIGH VITAMIN D LEVEL: Primary | ICD-10-CM

## 2025-02-11 ENCOUNTER — LAB (OUTPATIENT)
Dept: LAB | Facility: CLINIC | Age: 60
End: 2025-02-11
Payer: COMMERCIAL

## 2025-02-11 DIAGNOSIS — E67.3 HIGH VITAMIN D LEVEL: ICD-10-CM

## 2025-02-11 LAB
CA-I BLD-MCNC: 4.8 MG/DL (ref 4.4–5.2)
PTH-INTACT SERPL-MCNC: 66 PG/ML (ref 15–65)

## 2025-02-11 PROCEDURE — 82330 ASSAY OF CALCIUM: CPT

## 2025-02-11 PROCEDURE — 83970 ASSAY OF PARATHORMONE: CPT

## 2025-02-11 PROCEDURE — 99000 SPECIMEN HANDLING OFFICE-LAB: CPT

## 2025-02-11 PROCEDURE — 36415 COLL VENOUS BLD VENIPUNCTURE: CPT

## 2025-02-11 PROCEDURE — 82397 CHEMILUMINESCENT ASSAY: CPT | Mod: 90

## 2025-02-24 ENCOUNTER — HOSPITAL ENCOUNTER (OUTPATIENT)
Dept: CT IMAGING | Facility: CLINIC | Age: 60
Discharge: HOME | End: 2025-02-24
Attending: FAMILY MEDICINE
Payer: COMMERCIAL

## 2025-02-24 ENCOUNTER — HOSPITAL ENCOUNTER (OUTPATIENT)
Dept: ULTRASOUND IMAGING | Facility: CLINIC | Age: 60
Discharge: HOME | End: 2025-02-24
Attending: FAMILY MEDICINE
Payer: COMMERCIAL

## 2025-02-24 DIAGNOSIS — Z82.49 FAMILY HISTORY OF ABDOMINAL AORTIC ANEURYSM (AAA): ICD-10-CM

## 2025-02-24 DIAGNOSIS — Z87.891 PERSONAL HISTORY OF TOBACCO USE: ICD-10-CM

## 2025-02-24 PROCEDURE — 76775 US EXAM ABDO BACK WALL LIM: CPT

## 2025-02-24 PROCEDURE — 71271 CT THORAX LUNG CANCER SCR C-: CPT

## 2025-02-26 ENCOUNTER — HOSPITAL ENCOUNTER (OUTPATIENT)
Dept: CARDIOLOGY | Facility: CLINIC | Age: 60
Setting detail: NUCLEAR MEDICINE
Discharge: HOME | End: 2025-02-26
Attending: FAMILY MEDICINE
Payer: COMMERCIAL

## 2025-02-26 ENCOUNTER — HOSPITAL ENCOUNTER (OUTPATIENT)
Dept: NUCLEAR MEDICINE | Facility: CLINIC | Age: 60
Setting detail: NUCLEAR MEDICINE
Discharge: HOME | End: 2025-02-26
Attending: FAMILY MEDICINE
Payer: COMMERCIAL

## 2025-02-26 ENCOUNTER — MYC MEDICAL ADVICE (OUTPATIENT)
Dept: FAMILY MEDICINE | Facility: CLINIC | Age: 60
End: 2025-02-26

## 2025-02-26 VITALS — BODY MASS INDEX: 35.44 KG/M2 | HEIGHT: 63 IN | WEIGHT: 200 LBS

## 2025-02-26 DIAGNOSIS — Z82.49 FAMILY HISTORY OF ISCHEMIC HEART DISEASE: ICD-10-CM

## 2025-02-26 DIAGNOSIS — R07.89 CHEST PRESSURE: ICD-10-CM

## 2025-02-26 LAB
CV STRESS MAX HR HE: 88
NUC STRESS EJECTION FRACTION: 76 %
RATE PRESSURE PRODUCT: NORMAL
STRESS ECHO BASELINE DIASTOLIC HE: 84
STRESS ECHO BASELINE HR: 59 BPM
STRESS ECHO BASELINE SYSTOLIC BP: 120
STRESS ECHO CALCULATED PERCENT HR: 54 %
STRESS ECHO LAST STRESS DIASTOLIC BP: 84
STRESS ECHO LAST STRESS SYSTOLIC BP: 120
STRESS ECHO TARGET HR: 163
STRESS ST DEPRESSION: 0.5 MM

## 2025-02-26 PROCEDURE — 93017 CV STRESS TEST TRACING ONLY: CPT

## 2025-02-26 PROCEDURE — 343N000001 HC RX 343 MED OP 636: Performed by: FAMILY MEDICINE

## 2025-02-26 PROCEDURE — 250N000011 HC RX IP 250 OP 636: Performed by: FAMILY MEDICINE

## 2025-02-26 PROCEDURE — 78452 HT MUSCLE IMAGE SPECT MULT: CPT

## 2025-02-26 PROCEDURE — 93018 CV STRESS TEST I&R ONLY: CPT | Performed by: INTERNAL MEDICINE

## 2025-02-26 PROCEDURE — 78452 HT MUSCLE IMAGE SPECT MULT: CPT | Mod: 26 | Performed by: INTERNAL MEDICINE

## 2025-02-26 PROCEDURE — A9502 TC99M TETROFOSMIN: HCPCS | Performed by: FAMILY MEDICINE

## 2025-02-26 RX ORDER — REGADENOSON 0.08 MG/ML
0.4 INJECTION, SOLUTION INTRAVENOUS ONCE
Status: COMPLETED | OUTPATIENT
Start: 2025-02-26 | End: 2025-02-26

## 2025-02-26 RX ADMIN — TETROFOSMIN 10.4 MILLICURIE: 1.38 INJECTION, POWDER, LYOPHILIZED, FOR SOLUTION INTRAVENOUS at 07:40

## 2025-02-26 RX ADMIN — REGADENOSON 0.4 MG: 0.08 INJECTION, SOLUTION INTRAVENOUS at 08:52

## 2025-02-26 RX ADMIN — TETROFOSMIN 33.3 MILLICURIE: 1.38 INJECTION, POWDER, LYOPHILIZED, FOR SOLUTION INTRAVENOUS at 08:57

## 2025-03-27 ENCOUNTER — ANCILLARY PROCEDURE (OUTPATIENT)
Dept: GENERAL RADIOLOGY | Facility: CLINIC | Age: 60
End: 2025-03-27
Attending: FAMILY MEDICINE
Payer: COMMERCIAL

## 2025-03-27 ENCOUNTER — OFFICE VISIT (OUTPATIENT)
Dept: FAMILY MEDICINE | Facility: CLINIC | Age: 60
End: 2025-03-27
Payer: COMMERCIAL

## 2025-03-27 VITALS
SYSTOLIC BLOOD PRESSURE: 124 MMHG | BODY MASS INDEX: 34.98 KG/M2 | TEMPERATURE: 98 F | OXYGEN SATURATION: 97 % | WEIGHT: 197.4 LBS | HEIGHT: 63 IN | HEART RATE: 67 BPM | DIASTOLIC BLOOD PRESSURE: 84 MMHG | RESPIRATION RATE: 14 BRPM

## 2025-03-27 DIAGNOSIS — R42 DIZZINESS: ICD-10-CM

## 2025-03-27 DIAGNOSIS — R07.81 RIB PAIN: ICD-10-CM

## 2025-03-27 DIAGNOSIS — G43.819 OTHER MIGRAINE WITHOUT STATUS MIGRAINOSUS, INTRACTABLE: Primary | ICD-10-CM

## 2025-03-27 RX ORDER — METOPROLOL TARTRATE 25 MG/1
25 TABLET, FILM COATED ORAL 2 TIMES DAILY
Qty: 180 TABLET | Refills: 0 | Status: SHIPPED | OUTPATIENT
Start: 2025-03-27 | End: 2025-06-25

## 2025-03-27 ASSESSMENT — PAIN SCALES - GENERAL: PAINLEVEL_OUTOF10: NO PAIN (0)

## 2025-03-27 NOTE — PROGRESS NOTES
"  Assessment & Plan     Other migraine without status migrainosus, intractable  Migraines are worsening, headaches do seem ot have some component of mild tension as well. She has previously responded well to BB and we will trial again, we can re-establish with headache clinic.   - metoprolol tartrate (LOPRESSOR) 25 MG tablet  Dispense: 180 tablet; Refill: 0  - MR Brain w/o Contrast  - Adult Neurology  Referral    Dizziness  Worsening dizziness over last few months, does not sound like vertigo, episodes  - MR Brain w/o Contrast  - Adult Neurology  Referral    Rib pain  Pain after recent falling restaurant, landed on garbage can  - XR Ribs Left 2 Views    Follow up in 3 months  The longitudinal plan of care for the diagnosis(es)/condition(s) as documented were addressed during this visit. Due to the added complexity in care, I will continue to support Shae in the subsequent management and with ongoing continuity of care.    Sorin Kaufman is a 60 year old, presenting for the following health issues:  Headache        3/27/2025     8:06 AM   Additional Questions   Roomed by Krista FLEMING MA   Accompanied by Self     History of Present Illness       Headaches:   Since the patient's last clinic visit, headaches are: worsened  The patient is getting headaches:  4 week  She is not able to do normal daily activities when she has a migraine.  The patient is taking the following rescue/relief medications:  Ibuprofen (Advil, Motrin) and sumatriptan (Imitrex)   Patient states \"I get some relief\" from the rescue/relief medications.   The patient is taking the following medications to prevent migraines:  No medications to prevent migraines  In the past 4 weeks, the patient has gone to an Urgent Care or Emergency Room 0 times times due to headaches.         See [place] message dated 2/26/25.      Review of Systems  Constitutional, HEENT, cardiovascular, pulmonary, gi and gu systems are negative, except as " "otherwise noted.      Objective    /84 (BP Location: Right arm, Patient Position: Chair, Cuff Size: Adult Large)   Pulse 67   Temp 98  F (36.7  C)   Resp 14   Ht 1.6 m (5' 3\")   Wt 89.5 kg (197 lb 6.4 oz)   LMP 04/05/2012   SpO2 97%   BMI 34.97 kg/m    Body mass index is 34.97 kg/m .  Physical Exam   GENERAL: alert and no distress  EYES: Eyes grossly normal to inspection, PERRL and conjunctivae and sclerae normal  HENT: ear canals and TM's normal, nose and mouth without ulcers or lesions  NECK: no adenopathy, no asymmetry, masses, or scars  RESP: lungs clear to auscultation - no rales, rhonchi or wheezes  CV: regular rate and rhythm, normal S1 S2, no S3 or S4, no murmur, click or rub, no peripheral edema  ABDOMEN: soft, nontender, no hepatosplenomegaly, no masses and bowel sounds normal  MS: no gross musculoskeletal defects noted, no edema  SKIN: no suspicious lesions or rashes  NEURO: Normal strength and tone, mentation intact and speech normal  PSYCH: mentation appears normal, affect normal/bright        Signed Electronically by: Rona Chang, DO    "

## 2025-04-02 ENCOUNTER — OFFICE VISIT (OUTPATIENT)
Dept: CARDIOLOGY | Facility: CLINIC | Age: 60
End: 2025-04-02
Attending: FAMILY MEDICINE
Payer: COMMERCIAL

## 2025-04-02 VITALS
OXYGEN SATURATION: 93 % | WEIGHT: 198.8 LBS | HEART RATE: 63 BPM | HEIGHT: 63 IN | SYSTOLIC BLOOD PRESSURE: 132 MMHG | RESPIRATION RATE: 16 BRPM | DIASTOLIC BLOOD PRESSURE: 86 MMHG | BODY MASS INDEX: 35.22 KG/M2

## 2025-04-02 DIAGNOSIS — R07.89 CHEST PRESSURE: ICD-10-CM

## 2025-04-02 DIAGNOSIS — Z82.49 FAMILY HISTORY OF ISCHEMIC HEART DISEASE: ICD-10-CM

## 2025-04-02 PROCEDURE — 3079F DIAST BP 80-89 MM HG: CPT | Performed by: INTERNAL MEDICINE

## 2025-04-02 PROCEDURE — 99204 OFFICE O/P NEW MOD 45 MIN: CPT | Performed by: INTERNAL MEDICINE

## 2025-04-02 PROCEDURE — 3075F SYST BP GE 130 - 139MM HG: CPT | Performed by: INTERNAL MEDICINE

## 2025-04-02 PROCEDURE — G2211 COMPLEX E/M VISIT ADD ON: HCPCS | Performed by: INTERNAL MEDICINE

## 2025-04-02 RX ORDER — ATORVASTATIN CALCIUM 20 MG/1
20 TABLET, FILM COATED ORAL DAILY
Qty: 34 TABLET | Refills: 3 | Status: SHIPPED | OUTPATIENT
Start: 2025-04-02

## 2025-04-02 NOTE — PROGRESS NOTES
CARDIOLOGY CLINIC CONSULTATION    PRIMARY CARE PHYSICIAN:  Rona Chang    Tests reviewed/interpreted independently in clinic today:   EKG, Echocardiogram, Blood work.     The level of medical decision making during this visit was of moderate complexity.     The longitudinal plan of care for the diagnosis(es)/condition(s) as documented were addressed during this visit. Due to the added complexity in care, I will continue to support Shae in the subsequent management and with ongoing continuity of care.     HISTORY OF PRESENT ILLNESS:  Today, I had the pleasure of connecting with Shae Pierre.  60-year-old lady with past medical history of hypertension, hyperlipidemia, obesity, migraine, smoking and family history of premature coronary disease in multiple family members who presents to the clinic in initial consultation.  She tells me the most of her brothers and parents had heart attack in their late 50s.  She recently had a CT of the chest for cancer screening which did not show any coronary calcification.  However, LDL cholesterol is elevated at 142 mg/dL.  She is not on a statin.  She also had 2 episodes of chest pain.  The first 1 was on 31 December where she describes pain radiating from the center of the chest to the left arm.  Second episode was 3 weeks ago which was also described as tightness in the center of the chest.  This was followed by nuclear stress test a workup was performed with a nuclear stress test which was negative for ischemia.  LVEF was hyperdynamic.  On my review of the CT of the chest that was performed for cancer screening there are no coronary calcification in the left main or the visualized portion of the LAD and circumflex arteries.  RCA was not well-seen.  She has not had any recent episodes of angina for the past 3 weeks.          Assessment and Impression:      Pertinent issues addressed/ reviewed during this cardiology visit    Chest pain-negative stress test, no  coronary calcification on normal cardiac CT chest.  Discussed that she needs to stop smoking completely.  Will monitor her for now since symptoms have resolved.  However if she continues to have recurrences we will perform a dedicated CT coronary angiogram for further workup.  She is on metoprolol which I am going to continue    Smoking history-discussed complete cessation of smoking.    Obesity-discussed lifestyle modifications with  heart healthy diet and regular exercise.    Hyperlipidemia-we will start her on a statin.  She has elevated 10-year ASCVD risk score.  Will check lipid profile and ALT in 3 months and discharge her with the results of the test.  Discussed side effects of lipids and if she experiences myalgias she will reach out to us.    Ricky WILSON, FACC, RED  Cardiology - [facility]  April 2, 2025    Orders Placed This Encounter   Procedures    Lipid panel reflex to direct LDL Fasting    Hepatic panel       PAST MEDICAL HISTORY:  Past Medical History:   Diagnosis Date    Arthritis 2000    Dysplasia of cervix (uteri) 1998    Fibroids 02/22/2012    Other abnormal Pap smear and cervical HPV (human papapillomavirus) 09/29/2008    EM>40 and pos HPV unknown type rec rpt pap 6 mos/11/08  1/26/2009 F/u pap was normal  Will have next pap in 1 year.     Other acne     PONV (postoperative nausea and vomiting)        MEDICATIONS:  Current Outpatient Medications   Medication Sig Dispense Refill    ADVIL 200 MG OR TABS Take by mouth. 120 0    atorvastatin (LIPITOR) 20 MG tablet Take 1 tablet (20 mg) by mouth daily. 34 tablet 3    latanoprost (XALATAN) 0.005 % ophthalmic solution Place 1 drop into each eye EVERY EVENING      timolol maleate (TIMOPTIC) 0.5 % ophthalmic solution Place 1 drop into both eyes daily      augmented betamethasone dipropionate (DIPROLENE AF) 0.05 % external cream Apply sparingly to affected area twice daily as needed.  Do not apply to face. 100 g 3    GLUCOSAMINE SULFATE PO Take 500  mg by mouth 2 times daily (Patient not taking: Reported on 4/2/2025)      metoprolol tartrate (LOPRESSOR) 25 MG tablet Take 1 tablet (25 mg) by mouth 2 times daily. 180 tablet 0    MULTI-VITAMIN OR TABS 1 TABLET DAILY (Patient not taking: Reported on 3/27/2025) 30 0    nystatin (MYCOSTATIN) 629068 UNIT/GM external powder Apply topically 2 times daily 60 g 1    Omega-3 Fatty Acids (FISH OIL) 435 MG CAPS Take 1 capsule by mouth 2 times daily (Patient not taking: Reported on 4/2/2025)      SUMAtriptan (IMITREX) 100 MG tablet Take 1 tablet (100 mg) by mouth at onset of headache for migraine. May repeat in 2 hours prn: max 2/day 12 tablet 4     No current facility-administered medications for this visit.       ALLERGIES:  Allergies   Allergen Reactions    Morphine Other (See Comments) and Difficulty breathing     Throat closes    Morphine And Codeine Other (See Comments) and Difficulty breathing     Throat closes    Sulfa Antibiotics Hives    Staples Other (See Comments) and Rash     Rash and infection after knee surgery       SOCIAL HISTORY:  I have reviewed this patient's social history and updated it with pertinent information if needed. Shae Pierre  reports that she quit smoking about 15 years ago. Her smoking use included cigarettes. She has been exposed to tobacco smoke. She has never used smokeless tobacco. She reports current alcohol use. She reports that she does not use drugs.    FAMILY HISTORY:  I have reviewed this patient's family history and updated it with pertinent information if needed.   Family History   Problem Relation Age of Onset    Circulatory Mother         AAA    Chronic Obstructive Pulmonary Disease Mother     Osteoporosis Mother     Hypertension Father     C.A.D. Father     Alzheimer Disease Father     Lipids Sister     Hyperlipidemia Sister     Lipids Sister     Lipids Brother     Heart Disease Brother 58        MI    Diabetes Brother     Coronary Artery Disease Brother     Lipids Brother   "   Heart Disease Brother 60        MI    Hyperlipidemia Brother     Anesthesia Reaction Brother     Coronary Artery Disease Brother     Lipids Brother     Prostate Cancer Brother     Osteoporosis Brother     Coronary Artery Disease Brother     Lipids Brother     Breast Cancer Maternal Grandmother     Asthma Daughter     No Known Problems Daughter     Breast Cancer Maternal Aunt        REVIEW OF SYSTEMS:  Skin:        Eyes:       ENT:       Respiratory:  Negative for shortness of breath, dyspnea on exertion  Cardiovascular:    Positive for, chest pain, lower extremity symptoms, edema, fatigue, lightheadedness, dizziness  Gastroenterology:      Genitourinary:       Musculoskeletal:       Neurologic:       Psychiatric:       Heme/Lymph/Imm:       Endocrine:           PHYSICAL EXAM:  /86   Pulse 63   Resp 16   Ht 1.6 m (5' 3\")   Wt 90.2 kg (198 lb 12.8 oz)   LMP 04/05/2012   SpO2 93%   BMI 35.22 kg/m    Constitutional: alert, no distress  Respiratory: Good bilateral air entry  Cardiovascular: s1 s2 normal, no murmurs  GI: nondistended  Neuropsychiatric: appropriate affact  Edema: none    This note was completed in part using dictation via the Dragon voice recognition software. Some word and grammatical errors may occur and must be interpreted in the appropriate clinical context.  If there are any questions pertaining to this issue, please contact me for further clarification.  "

## 2025-04-02 NOTE — LETTER
4/2/2025    [place]  [address] [facility] [address]    RE: Shae BASS Ignacio       Dear Colleague,     I had the pleasure of seeing Shae Pierre in the [facility].      CARDIOLOGY CLINIC CONSULTATION    PRIMARY CARE PHYSICIAN:  Rona Chang    Tests reviewed/interpreted independently in clinic today:   EKG, Echocardiogram, Blood work.     The level of medical decision making during this visit was of moderate complexity.     The longitudinal plan of care for the diagnosis(es)/condition(s) as documented were addressed during this visit. Due to the added complexity in care, I will continue to support Shae in the subsequent management and with ongoing continuity of care.     HISTORY OF PRESENT ILLNESS:  Today, I had the pleasure of connecting with Shae Pierre.  60-year-old lady with past medical history of hypertension, hyperlipidemia, obesity, migraine, smoking and family history of premature coronary disease in multiple family members who presents to the clinic in initial consultation.  She tells me the most of her brothers and parents had heart attack in their late 50s.  She recently had a CT of the chest for cancer screening which did not show any coronary calcification.  However, LDL cholesterol is elevated at 142 mg/dL.  She is not on a statin.  She also had 2 episodes of chest pain.  The first 1 was on 31 December where she describes pain radiating from the center of the chest to the left arm.  Second episode was 3 weeks ago which was also described as tightness in the center of the chest.  This was followed by nuclear stress test a workup was performed with a nuclear stress test which was negative for ischemia.  LVEF was hyperdynamic.  On my review of the CT of the chest that was performed for cancer screening there are no coronary calcification in the left main or the visualized portion of the LAD and circumflex arteries.  RCA was not well-seen.  She has not  had any recent episodes of angina for the past 3 weeks.          Assessment and Impression:      Pertinent issues addressed/ reviewed during this cardiology visit    Chest pain-negative stress test, no coronary calcification on normal cardiac CT chest.  Discussed that she needs to stop smoking completely.  Will monitor her for now since symptoms have resolved.  However if she continues to have recurrences we will perform a dedicated CT coronary angiogram for further workup.  She is on metoprolol which I am going to continue    Smoking history-discussed complete cessation of smoking.    Obesity-discussed lifestyle modifications with  heart healthy diet and regular exercise.    Hyperlipidemia-we will start her on a statin.  She has elevated 10-year ASCVD risk score.  Will check lipid profile and ALT in 3 months and discharge her with the results of the test.  Discussed side effects of lipids and if she experiences myalgias she will reach out to us.    Ricky WILSON, FACC, [facility]  Cardiology - [facility]  April 2, 2025    Orders Placed This Encounter   Procedures     Lipid panel reflex to direct LDL Fasting     Hepatic panel       PAST MEDICAL HISTORY:  Past Medical History:   Diagnosis Date     Arthritis 2000     Dysplasia of cervix (uteri) 1998     Fibroids 02/22/2012     Other abnormal Pap smear and cervical HPV (human papapillomavirus) 09/29/2008    EM>40 and pos HPV unknown type rec rpt pap 6 mos/11/08  1/26/2009 F/u pap was normal  Will have next pap in 1 year.      Other acne      PONV (postoperative nausea and vomiting)        MEDICATIONS:  Current Outpatient Medications   Medication Sig Dispense Refill     ADVIL 200 MG OR TABS Take by mouth. 120 0     atorvastatin (LIPITOR) 20 MG tablet Take 1 tablet (20 mg) by mouth daily. 34 tablet 3     latanoprost (XALATAN) 0.005 % ophthalmic solution Place 1 drop into each eye EVERY EVENING       timolol maleate (TIMOPTIC) 0.5 % ophthalmic solution Place 1 drop into  both eyes daily       augmented betamethasone dipropionate (DIPROLENE AF) 0.05 % external cream Apply sparingly to affected area twice daily as needed.  Do not apply to face. 100 g 3     GLUCOSAMINE SULFATE PO Take 500 mg by mouth 2 times daily (Patient not taking: Reported on 4/2/2025)       metoprolol tartrate (LOPRESSOR) 25 MG tablet Take 1 tablet (25 mg) by mouth 2 times daily. 180 tablet 0     MULTI-VITAMIN OR TABS 1 TABLET DAILY (Patient not taking: Reported on 3/27/2025) 30 0     nystatin (MYCOSTATIN) 118837 UNIT/GM external powder Apply topically 2 times daily 60 g 1     Omega-3 Fatty Acids (FISH OIL) 435 MG CAPS Take 1 capsule by mouth 2 times daily (Patient not taking: Reported on 4/2/2025)       SUMAtriptan (IMITREX) 100 MG tablet Take 1 tablet (100 mg) by mouth at onset of headache for migraine. May repeat in 2 hours prn: max 2/day 12 tablet 4     No current facility-administered medications for this visit.       ALLERGIES:  Allergies   Allergen Reactions     Morphine Other (See Comments) and Difficulty breathing     Throat closes     Morphine And Codeine Other (See Comments) and Difficulty breathing     Throat closes     Sulfa Antibiotics Hives     Staples Other (See Comments) and Rash     Rash and infection after knee surgery       SOCIAL HISTORY:  I have reviewed this patient's social history and updated it with pertinent information if needed. Shae Pierre  reports that she quit smoking about 15 years ago. Her smoking use included cigarettes. She has been exposed to tobacco smoke. She has never used smokeless tobacco. She reports current alcohol use. She reports that she does not use drugs.    FAMILY HISTORY:  I have reviewed this patient's family history and updated it with pertinent information if needed.   Family History   Problem Relation Age of Onset     Circulatory Mother         AAA     Chronic Obstructive Pulmonary Disease Mother      Osteoporosis Mother      Hypertension Father      C.A.D.  "Father      Alzheimer Disease Father      Lipids Sister      Hyperlipidemia Sister      Lipids Sister      Lipids Brother      Heart Disease Brother 58        MI     Diabetes Brother      Coronary Artery Disease Brother      Lipids Brother      Heart Disease Brother 60        MI     Hyperlipidemia Brother      Anesthesia Reaction Brother      Coronary Artery Disease Brother      Lipids Brother      Prostate Cancer Brother      Osteoporosis Brother      Coronary Artery Disease Brother      Lipids Brother      Breast Cancer Maternal Grandmother      Asthma Daughter      No Known Problems Daughter      Breast Cancer Maternal Aunt        REVIEW OF SYSTEMS:  Skin:        Eyes:       ENT:       Respiratory:  Negative for shortness of breath, dyspnea on exertion  Cardiovascular:    Positive for, chest pain, lower extremity symptoms, edema, fatigue, lightheadedness, dizziness  Gastroenterology:      Genitourinary:       Musculoskeletal:       Neurologic:       Psychiatric:       Heme/Lymph/Imm:       Endocrine:           PHYSICAL EXAM:  /86   Pulse 63   Resp 16   Ht 1.6 m (5' 3\")   Wt 90.2 kg (198 lb 12.8 oz)   LMP 04/05/2012   SpO2 93%   BMI 35.22 kg/m    Constitutional: alert, no distress  Respiratory: Good bilateral air entry  Cardiovascular: s1 s2 normal, no murmurs  GI: nondistended  Neuropsychiatric: appropriate affact  Edema: none    This note was completed in part using dictation via the Dragon voice recognition software. Some word and grammatical errors may occur and must be interpreted in the appropriate clinical context.  If there are any questions pertaining to this issue, please contact me for further clarification.    Thank you for allowing me to participate in the care of your patient.      Sincerely,     Ricky Snowden MD     [facility]  cc:   [place]  [address] [place] [address]      "

## 2025-04-08 ENCOUNTER — HOSPITAL ENCOUNTER (OUTPATIENT)
Dept: MRI IMAGING | Facility: CLINIC | Age: 60
Discharge: HOME | End: 2025-04-08
Attending: FAMILY MEDICINE | Admitting: FAMILY MEDICINE
Payer: COMMERCIAL

## 2025-04-08 DIAGNOSIS — R42 DIZZINESS: ICD-10-CM

## 2025-04-08 DIAGNOSIS — G43.819 OTHER MIGRAINE WITHOUT STATUS MIGRAINOSUS, INTRACTABLE: ICD-10-CM

## 2025-04-08 PROCEDURE — 70551 MRI BRAIN STEM W/O DYE: CPT

## 2025-06-25 DIAGNOSIS — L30.4 INTERTRIGO: ICD-10-CM

## 2025-06-26 RX ORDER — NYSTATIN 100000 [USP'U]/G
POWDER TOPICAL 2 TIMES DAILY
Qty: 60 G | Refills: 1 | Status: SHIPPED | OUTPATIENT
Start: 2025-06-26

## 2025-06-26 NOTE — TELEPHONE ENCOUNTER
Requested Prescriptions   Pending Prescriptions Disp Refills    nystatin (MYCOSTATIN) [id number] UNIT/GM external powder [Pharmacy Med Name: nystatin 100,000 unit/gram topical powder] 60 g 1     Sig: Apply topically 2 times daily       Antifungal Agents Failed - 6/26/2025 11:31 AM        Failed - Medication indicated for associated diagnosis     Medication is associated with one or more of the following diagnoses:     Tinea pedis  Tinea cruris  Tinea corporis  Tinea capitis  Tinea barbae  Tinea faciei  Tinea manuum  Tinea nigra  Onychomycosis  Cutaneous candidias  Pityriasis versicolor          Passed - Medication is active on med list and the sig matches. RN to manually verify dose and sig if red X/fail.     If the protocol passes (green check), you do not need to verify med dose and sig.    A prescription matches if they are the same clinical intention.    For Example: once daily and every morning are the same.    The protocol can not identify upper and lower case letters as matching and will fail.     For Example: Take 1 tablet (50 mg) by mouth daily     TAKE 1 TABLET (50 MG) BY MOUTH DAILY    For all fails (red x), verify dose and sig.    If the refill does match what is on file, the RN can still proceed to approve the refill request.       If they do not match, route to the appropriate provider.             Passed - Recent (12 month) or future (90 days) visit with authorizing provider's specialty (provided they have been seen in the past 15 months)     The patient must have completed an in-person or virtual visit within the past 12 months or has a future visit scheduled within the next 90 days with the authorizing provider s specialty.  Urgent care and e-visits do not qualify as an office visit for this protocol.

## (undated) DEVICE — DRAPE POUCH INSTRUMENT 3 POCKET 1018L

## (undated) DEVICE — ENDO TROCAR BLUNT TIP KII BALLOON 12X100MM C0R47

## (undated) DEVICE — ADH SKIN CLOSURE PREMIERPRO EXOFIN 1.0ML 3470

## (undated) DEVICE — ENDO TROCAR SLEEVE KII ADV FIXATION 05X100MM CFS02

## (undated) DEVICE — GOWN XLG DISP 9545

## (undated) DEVICE — ESU HOLSTER PLASTIC DISP E2400

## (undated) DEVICE — Device

## (undated) DEVICE — SU VICRYL 4-0 FS-2 27" J422-H

## (undated) DEVICE — ENDO TROCAR FIRST ENTRY KII FIOS ADV FIX 05X100MM CFF03

## (undated) DEVICE — ESU ENDO SCISSORS 5MM CVD 5DCS

## (undated) DEVICE — PREP CHLORAPREP 26ML TINTED ORANGE  260815

## (undated) DEVICE — SU VICRYL 0 UR-6 27" J603H

## (undated) DEVICE — ENDO POUCH UNIV RETRIEVAL SYSTEM INZII 10MM CD001

## (undated) DEVICE — SUCTION IRRIGATION STRYKFLOW II W/TIP DISP 250-070-520

## (undated) DEVICE — CLIP APPLIER ENDO 5MM M/L LIGAMAX EL5ML

## (undated) DEVICE — ESU CORD MONOPOLAR 10'  E0510

## (undated) DEVICE — GLOVE PROTEXIS W/NEU-THERA 8.0  2D73TE80

## (undated) DEVICE — DECANTER VIAL 2006S

## (undated) DEVICE — SOL WATER IRRIG 1000ML BOTTLE 07139-09

## (undated) DEVICE — SOL NACL 0.9% IRRIG 1000ML BOTTLE 07138-09

## (undated) DEVICE — ESU PENCIL W/COATED BLADE E2450H

## (undated) RX ORDER — LEVOFLOXACIN 5 MG/ML
INJECTION, SOLUTION INTRAVENOUS
Status: DISPENSED
Start: 2021-11-17

## (undated) RX ORDER — SCOLOPAMINE TRANSDERMAL SYSTEM 1 MG/1
PATCH, EXTENDED RELEASE TRANSDERMAL
Status: DISPENSED
Start: 2021-11-17

## (undated) RX ORDER — KETOROLAC TROMETHAMINE 30 MG/ML
INJECTION, SOLUTION INTRAMUSCULAR; INTRAVENOUS
Status: DISPENSED
Start: 2021-11-17

## (undated) RX ORDER — HYDROMORPHONE HYDROCHLORIDE 1 MG/ML
INJECTION, SOLUTION INTRAMUSCULAR; INTRAVENOUS; SUBCUTANEOUS
Status: DISPENSED
Start: 2018-04-16

## (undated) RX ORDER — OXYCODONE HYDROCHLORIDE 5 MG/1
TABLET ORAL
Status: DISPENSED
Start: 2021-11-17

## (undated) RX ORDER — PROPOFOL 10 MG/ML
INJECTION, EMULSION INTRAVENOUS
Status: DISPENSED
Start: 2021-11-17

## (undated) RX ORDER — GLYCOPYRROLATE 0.2 MG/ML
INJECTION, SOLUTION INTRAMUSCULAR; INTRAVENOUS
Status: DISPENSED
Start: 2018-04-16

## (undated) RX ORDER — HYDROXYZINE HYDROCHLORIDE 50 MG/1
TABLET, FILM COATED ORAL
Status: DISPENSED
Start: 2021-11-17

## (undated) RX ORDER — DIMENHYDRINATE 50 MG/ML
INJECTION, SOLUTION INTRAMUSCULAR; INTRAVENOUS
Status: DISPENSED
Start: 2021-11-17

## (undated) RX ORDER — FENTANYL CITRATE 50 UG/ML
INJECTION, SOLUTION INTRAMUSCULAR; INTRAVENOUS
Status: DISPENSED
Start: 2021-11-17

## (undated) RX ORDER — REGADENOSON 0.08 MG/ML
INJECTION, SOLUTION INTRAVENOUS
Status: DISPENSED
Start: 2025-02-26

## (undated) RX ORDER — GABAPENTIN 300 MG/1
CAPSULE ORAL
Status: DISPENSED
Start: 2021-11-17

## (undated) RX ORDER — OXYCODONE HCL 10 MG/1
TABLET, FILM COATED, EXTENDED RELEASE ORAL
Status: DISPENSED
Start: 2021-11-17

## (undated) RX ORDER — BUPIVACAINE HYDROCHLORIDE AND EPINEPHRINE 5; 5 MG/ML; UG/ML
INJECTION, SOLUTION EPIDURAL; INTRACAUDAL; PERINEURAL
Status: DISPENSED
Start: 2021-11-17

## (undated) RX ORDER — ACETAMINOPHEN 325 MG/1
TABLET ORAL
Status: DISPENSED
Start: 2021-11-17